# Patient Record
Sex: MALE | Race: WHITE | NOT HISPANIC OR LATINO | Employment: OTHER | URBAN - NONMETROPOLITAN AREA
[De-identification: names, ages, dates, MRNs, and addresses within clinical notes are randomized per-mention and may not be internally consistent; named-entity substitution may affect disease eponyms.]

---

## 2017-07-14 ENCOUNTER — OFFICE VISIT (OUTPATIENT)
Dept: UROLOGY | Facility: CLINIC | Age: 67
End: 2017-07-14

## 2017-07-14 VITALS — BODY MASS INDEX: 23.04 KG/M2 | WEIGHT: 152 LBS | HEIGHT: 68 IN

## 2017-07-14 DIAGNOSIS — N40.1 BENIGN NON-NODULAR PROSTATIC HYPERPLASIA WITH LOWER URINARY TRACT SYMPTOMS: ICD-10-CM

## 2017-07-14 DIAGNOSIS — R31.29 MICROSCOPIC HEMATURIA: Primary | ICD-10-CM

## 2017-07-14 LAB
BILIRUB BLD-MCNC: NEGATIVE MG/DL
CLARITY, POC: CLEAR
COLOR UR: YELLOW
GLUCOSE UR STRIP-MCNC: NEGATIVE MG/DL
KETONES UR QL: NEGATIVE
LEUKOCYTE EST, POC: NEGATIVE
NITRITE UR-MCNC: NEGATIVE MG/ML
PH UR: 5.5 [PH] (ref 5–8)
PROT UR STRIP-MCNC: NEGATIVE MG/DL
RBC # UR STRIP: ABNORMAL /UL
SP GR UR: 1.02 (ref 1–1.03)
UROBILINOGEN UR QL: NORMAL

## 2017-07-14 PROCEDURE — 99214 OFFICE O/P EST MOD 30 MIN: CPT | Performed by: UROLOGY

## 2017-07-14 PROCEDURE — 81003 URINALYSIS AUTO W/O SCOPE: CPT | Performed by: UROLOGY

## 2017-07-14 RX ORDER — ERGOCALCIFEROL 1.25 MG/1
CAPSULE ORAL
Refills: 2 | COMMUNITY
Start: 2017-06-15 | End: 2019-09-24

## 2017-07-14 RX ORDER — LEVOTHYROXINE SODIUM 0.05 MG/1
50 TABLET ORAL DAILY
Refills: 2 | COMMUNITY
Start: 2017-06-15 | End: 2020-08-03

## 2017-07-14 RX ORDER — TAMSULOSIN HYDROCHLORIDE 0.4 MG/1
1 CAPSULE ORAL DAILY
Refills: 5 | COMMUNITY
Start: 2017-06-15 | End: 2020-12-18

## 2017-07-14 RX ORDER — LISINOPRIL 2.5 MG/1
TABLET ORAL
Refills: 5 | COMMUNITY
Start: 2017-06-15 | End: 2020-08-03

## 2017-07-14 RX ORDER — WARFARIN SODIUM 5 MG/1
5 TABLET ORAL NIGHTLY
Refills: 5 | COMMUNITY
Start: 2017-06-15 | End: 2020-08-03

## 2017-07-14 RX ORDER — SITAGLIPTIN 100 MG/1
TABLET, FILM COATED ORAL
Refills: 5 | COMMUNITY
Start: 2017-06-15 | End: 2019-09-24

## 2017-07-14 RX ORDER — OMEPRAZOLE 20 MG/1
20 CAPSULE, DELAYED RELEASE ORAL AS NEEDED
Refills: 3 | COMMUNITY
Start: 2017-06-15 | End: 2020-08-03

## 2017-07-14 RX ORDER — NYSTATIN 100000 U/G
CREAM TOPICAL
Refills: 5 | COMMUNITY
Start: 2017-06-28 | End: 2019-09-24

## 2017-07-14 RX ORDER — FERROUS SULFATE 325(65) MG
325 TABLET ORAL
Refills: 3 | COMMUNITY
Start: 2017-06-17 | End: 2020-08-03

## 2017-07-14 NOTE — PROGRESS NOTES
Chief Complaint:          Chief Complaint   Patient presents with   • Micro Hematuria     Patient was referred by PCP for Micro Hematuria.  Patient denies Gross Hematuria.       HPI:   67 y.o. male.    HPI  Pt has microscopic hematuria.  Pt has mild bph symptoms.      Past Medical History:        Past Medical History:   Diagnosis Date   • Blood clot in abdominal vein    • BPH (benign prostatic hypertrophy)    • Diabetes    • Elevated PSA    • GERD (gastroesophageal reflux disease)    • Hypertension    • MI (myocardial infarction)          Current Meds:     Current Outpatient Prescriptions   Medication Sig Dispense Refill   • ferrous sulfate 325 (65 FE) MG tablet   3   • JANUVIA 100 MG tablet   5   • levothyroxine (SYNTHROID, LEVOTHROID) 50 MCG tablet   2   • lisinopril (PRINIVIL,ZESTRIL) 2.5 MG tablet   5   • metFORMIN (GLUCOPHAGE) 500 MG tablet   5   • nystatin (MYCOSTATIN) 701987 UNIT/GM cream   5   • omeprazole (priLOSEC) 20 MG capsule   3   • STOOL SOFTENER 250 MG capsule   4   • tamsulosin (FLOMAX) 0.4 MG capsule 24 hr capsule   5   • vitamin D (ERGOCALCIFEROL) 53330 UNITS capsule capsule   2   • warfarin (COUMADIN) 5 MG tablet   5     No current facility-administered medications for this visit.         Allergies:      Allergies   Allergen Reactions   • Reglan [Metoclopramide]         Past Surgical History:     Past Surgical History:   Procedure Laterality Date   • ABDOMINAL WASHOUT     • CHOLECYSTECTOMY     • HERNIA REPAIR     • KIDNEY STONE SURGERY           Social History:     Social History     Social History   • Marital status:      Spouse name: N/A   • Number of children: N/A   • Years of education: N/A     Occupational History   • Not on file.     Social History Main Topics   • Smoking status: Former Smoker   • Smokeless tobacco: Not on file   • Alcohol use No   • Drug use: Not on file   • Sexual activity: Not on file     Other Topics Concern   • Not on file     Social History Narrative   • No  narrative on file       Family History:     Family History   Problem Relation Age of Onset   • Heart disease Father    • Heart failure Mother    • Stroke Brother        Review of Systems:     Review of Systems    Physical Exam:     Physical Exam   Constitutional: He is oriented to person, place, and time.   HENT:   Head: Normocephalic and atraumatic.   Right Ear: External ear normal.   Left Ear: External ear normal.   Nose: Nose normal.   Mouth/Throat: Oropharynx is clear and moist.   Eyes: Conjunctivae and EOM are normal. Pupils are equal, round, and reactive to light.   Neck: Normal range of motion. Neck supple. No thyromegaly present.   Cardiovascular: Normal rate, regular rhythm, normal heart sounds and intact distal pulses.    No murmur heard.  Pulmonary/Chest: Effort normal and breath sounds normal. No respiratory distress. He has no wheezes. He has no rales. He exhibits no tenderness.   Abdominal: Soft. Bowel sounds are normal. He exhibits no distension and no mass. There is no tenderness. No hernia.   Genitourinary: Rectum normal, prostate normal and penis normal.   Musculoskeletal: Normal range of motion. He exhibits no edema or tenderness.   Lymphadenopathy:     He has no cervical adenopathy.   Neurological: He is alert and oriented to person, place, and time. No cranial nerve deficit. He exhibits normal muscle tone. Coordination normal.   Skin: Skin is warm. No rash noted.   Psychiatric: He has a normal mood and affect. His behavior is normal. Judgment and thought content normal.   Nursing note and vitals reviewed.      Procedure:     No notes on file      Assessment:     Encounter Diagnoses   Name Primary?   • Microscopic hematuria Yes   • Benign non-nodular prostatic hyperplasia with lower urinary tract symptoms      Orders Placed This Encounter   Procedures   • POC Urinalysis Dipstick, Automated       Plan:   Will order a psa and will get ctscan with renal mass protocol and office  cystoscop    Counseling was given to patient and family for the following topics impressions. and the interim medical history and current results were reviewed.  A treatment plan with follow-up was made. Total time of the encounter was 33 minutes and 33 minutes were spent discussing Microscopic hematuria [R31.29] face-to-face.        This document has been electronically signed by Lalo Camejo MD July 14, 2017 2:25 PM

## 2017-07-27 ENCOUNTER — HOSPITAL ENCOUNTER (OUTPATIENT)
Dept: CT IMAGING | Facility: HOSPITAL | Age: 67
Discharge: HOME | End: 2017-07-27
Attending: UROLOGY | Admitting: UROLOGY

## 2017-07-27 DIAGNOSIS — R31.29 MICROSCOPIC HEMATURIA: ICD-10-CM

## 2017-07-27 LAB — CREAT BLDA-MCNC: 1 MG/DL (ref 0.6–1.3)

## 2017-07-27 PROCEDURE — 0 IOPAMIDOL 61 % SOLUTION: Performed by: UROLOGY

## 2017-07-27 PROCEDURE — 82565 ASSAY OF CREATININE: CPT

## 2017-07-27 PROCEDURE — 74178 CT ABD&PLV WO CNTR FLWD CNTR: CPT | Performed by: RADIOLOGY

## 2017-07-27 PROCEDURE — 74178 CT ABD&PLV WO CNTR FLWD CNTR: CPT

## 2017-07-27 RX ADMIN — IOPAMIDOL 100 ML: 612 INJECTION, SOLUTION INTRAVENOUS at 10:00

## 2017-08-10 ENCOUNTER — PROCEDURE VISIT (OUTPATIENT)
Dept: UROLOGY | Facility: CLINIC | Age: 67
End: 2017-08-10

## 2017-08-10 DIAGNOSIS — N40.1 BENIGN NON-NODULAR PROSTATIC HYPERPLASIA WITH LOWER URINARY TRACT SYMPTOMS: ICD-10-CM

## 2017-08-10 DIAGNOSIS — R31.29 MICROHEMATURIA: Primary | ICD-10-CM

## 2017-08-10 DIAGNOSIS — R97.20 ELEVATED PSA: ICD-10-CM

## 2017-08-10 LAB
BILIRUB BLD-MCNC: NEGATIVE MG/DL
CLARITY, POC: CLEAR
COLOR UR: YELLOW
GLUCOSE UR STRIP-MCNC: NEGATIVE MG/DL
KETONES UR QL: NEGATIVE
LEUKOCYTE EST, POC: NEGATIVE
NITRITE UR-MCNC: NEGATIVE MG/ML
PH UR: 5 [PH] (ref 5–8)
PROT UR STRIP-MCNC: NEGATIVE MG/DL
PSA SERPL-MCNC: 0.17 NG/ML (ref 0–4)
RBC # UR STRIP: ABNORMAL /UL
SP GR UR: 1.02 (ref 1–1.03)
UROBILINOGEN UR QL: NORMAL

## 2017-08-10 PROCEDURE — 99213 OFFICE O/P EST LOW 20 MIN: CPT | Performed by: UROLOGY

## 2017-08-10 PROCEDURE — 84153 ASSAY OF PSA TOTAL: CPT | Performed by: UROLOGY

## 2017-08-10 PROCEDURE — 36415 COLL VENOUS BLD VENIPUNCTURE: CPT | Performed by: UROLOGY

## 2017-08-10 PROCEDURE — 52000 CYSTOURETHROSCOPY: CPT | Performed by: UROLOGY

## 2017-08-10 PROCEDURE — 81003 URINALYSIS AUTO W/O SCOPE: CPT | Performed by: UROLOGY

## 2017-08-10 NOTE — PROGRESS NOTES
Chief Complaint:       Chief Complaint   Patient presents with   • Blood in Urine           HPI:       HPI  Microhematuria and bph symptoms.  Ct scan shows enlarged prostate and thickened bladder.        PMI:      Past Medical History:   Diagnosis Date   • Blood clot in abdominal vein    • BPH (benign prostatic hypertrophy)    • Diabetes    • Elevated PSA    • GERD (gastroesophageal reflux disease)    • Hypertension    • MI (myocardial infarction)            Medications:        Current Outpatient Prescriptions:   •  ferrous sulfate 325 (65 FE) MG tablet, , Disp: , Rfl: 3  •  JANUVIA 100 MG tablet, , Disp: , Rfl: 5  •  levothyroxine (SYNTHROID, LEVOTHROID) 50 MCG tablet, , Disp: , Rfl: 2  •  lisinopril (PRINIVIL,ZESTRIL) 2.5 MG tablet, , Disp: , Rfl: 5  •  metFORMIN (GLUCOPHAGE) 500 MG tablet, , Disp: , Rfl: 5  •  nystatin (MYCOSTATIN) 227125 UNIT/GM cream, , Disp: , Rfl: 5  •  omeprazole (priLOSEC) 20 MG capsule, , Disp: , Rfl: 3  •  STOOL SOFTENER 250 MG capsule, , Disp: , Rfl: 4  •  tamsulosin (FLOMAX) 0.4 MG capsule 24 hr capsule, , Disp: , Rfl: 5  •  vitamin D (ERGOCALCIFEROL) 73452 UNITS capsule capsule, , Disp: , Rfl: 2  •  warfarin (COUMADIN) 5 MG tablet, , Disp: , Rfl: 5        Allergies:      Allergies   Allergen Reactions   • Reglan [Metoclopramide]            Past Surgical Histroy:      Past Surgical History:   Procedure Laterality Date   • ABDOMINAL WASHOUT     • CHOLECYSTECTOMY     • HERNIA REPAIR     • KIDNEY STONE SURGERY             Social History:      Social History     Social History   • Marital status:      Spouse name: N/A   • Number of children: N/A   • Years of education: N/A     Occupational History   • Not on file.     Social History Main Topics   • Smoking status: Former Smoker   • Smokeless tobacco: Not on file   • Alcohol use No   • Drug use: Not on file   • Sexual activity: Not on file     Other Topics Concern   • Not on file     Social History Narrative           Family  History:      Family History   Problem Relation Age of Onset   • Heart disease Father    • Heart failure Mother    • Stroke Brother              Physical Exam:      Physical Exam        Procedure:      Procedure: Cystoscopy Male    Indication: microscopic hematuria    Urinalysis Performed Today:  Negative for Infection    Premedication:none    Informed Consent Obtained    Sterile prep performed in usual fashion    11 cc of topical lidocaine inserted urethrally for 10 minutes    Flexible cystoscope inserted and bladder examined    Findings: abnormal 4 + trabeculation of bladder and intravesical median lobe and trilobar BPH    Additional Procedure with Cystoscopy: none    Discussion:      Will draw psa today and discussed finasteride with pt will see pt back in a month to review psa's.  Counseling was given to patient and family for the following topics diagnostic results. and the interim medical history and current results were reviewed.  A treatment plan with follow-up was made. Total time of the encounter was 21 minutes and 21 minutes were spent discussing Microhematuria [R31.29] face-to-face.    This document has been electronically signed by Lalo Camejo MD August 10, 2017 3:04 PM  .

## 2019-09-24 ENCOUNTER — OFFICE VISIT (OUTPATIENT)
Dept: UROLOGY | Facility: CLINIC | Age: 69
End: 2019-09-24

## 2019-09-24 ENCOUNTER — HOSPITAL ENCOUNTER (OUTPATIENT)
Dept: GENERAL RADIOLOGY | Facility: HOSPITAL | Age: 69
Discharge: HOME | End: 2019-09-24
Admitting: UROLOGY

## 2019-09-24 VITALS — HEIGHT: 68 IN | WEIGHT: 165 LBS | BODY MASS INDEX: 25.01 KG/M2

## 2019-09-24 DIAGNOSIS — N47.1 PHIMOSIS: ICD-10-CM

## 2019-09-24 DIAGNOSIS — R39.12 BENIGN PROSTATIC HYPERPLASIA WITH WEAK URINARY STREAM: ICD-10-CM

## 2019-09-24 DIAGNOSIS — N20.0 KIDNEY STONES: Primary | ICD-10-CM

## 2019-09-24 DIAGNOSIS — N40.1 BENIGN PROSTATIC HYPERPLASIA WITH WEAK URINARY STREAM: ICD-10-CM

## 2019-09-24 LAB
BILIRUB BLD-MCNC: NEGATIVE MG/DL
CLARITY, POC: CLEAR
COLOR UR: YELLOW
GLUCOSE UR STRIP-MCNC: NEGATIVE MG/DL
KETONES UR QL: NEGATIVE
LEUKOCYTE EST, POC: NEGATIVE
NITRITE UR-MCNC: NEGATIVE MG/ML
PH UR: 6 [PH] (ref 5–8)
PROT UR STRIP-MCNC: NEGATIVE MG/DL
RBC # UR STRIP: NEGATIVE /UL
SP GR UR: 1.02 (ref 1–1.03)
UROBILINOGEN UR QL: NORMAL

## 2019-09-24 PROCEDURE — 74018 RADEX ABDOMEN 1 VIEW: CPT

## 2019-09-24 PROCEDURE — 81003 URINALYSIS AUTO W/O SCOPE: CPT | Performed by: UROLOGY

## 2019-09-24 PROCEDURE — 74018 RADEX ABDOMEN 1 VIEW: CPT | Performed by: RADIOLOGY

## 2019-09-24 PROCEDURE — 99213 OFFICE O/P EST LOW 20 MIN: CPT | Performed by: UROLOGY

## 2019-09-24 PROCEDURE — 84153 ASSAY OF PSA TOTAL: CPT | Performed by: UROLOGY

## 2019-09-24 PROCEDURE — 36415 COLL VENOUS BLD VENIPUNCTURE: CPT | Performed by: UROLOGY

## 2019-09-24 NOTE — PROGRESS NOTES
Chief Complaint:          Kidney stone    HPI:   69 y.o. male.  Pt had ct scan done in [facility] that showed a 1 cm in size.  He had cystoscopy done in [facility] that was negative by history.  He grew that stone between 7/2017 and 2/2019.  HPI  His kub today shows a 1 cm left kidney stone.    Past Medical History:        Past Medical History:   Diagnosis Date   • Blood clot in abdominal vein    • BPH (benign prostatic hypertrophy)    • Diabetes (CMS/HCC)    • Elevated PSA    • GERD (gastroesophageal reflux disease)    • MI (myocardial infarction) (CMS/HCC)          Current Meds:     Current Outpatient Medications   Medication Sig Dispense Refill   • ferrous sulfate 325 (65 FE) MG tablet   3   • levothyroxine (SYNTHROID, LEVOTHROID) 50 MCG tablet   2   • lisinopril (PRINIVIL,ZESTRIL) 2.5 MG tablet   5   • omeprazole (priLOSEC) 20 MG capsule   3   • STOOL SOFTENER 250 MG capsule   4   • tamsulosin (FLOMAX) 0.4 MG capsule 24 hr capsule   5   • warfarin (COUMADIN) 5 MG tablet   5     No current facility-administered medications for this visit.         Allergies:      Allergies   Allergen Reactions   • Reglan [Metoclopramide]         Past Surgical History:     Past Surgical History:   Procedure Laterality Date   • ABDOMINAL WASHOUT     • CHOLECYSTECTOMY     • HERNIA REPAIR     • KIDNEY STONE SURGERY           Social History:     Social History     Socioeconomic History   • Marital status:      Spouse name: Not on file   • Number of children: Not on file   • Years of education: Not on file   • Highest education level: Not on file   Tobacco Use   • Smoking status: Former Smoker   • Smokeless tobacco: Never Used   Substance and Sexual Activity   • Alcohol use: No   • Drug use: No   • Sexual activity: Defer       Family History:     Family History   Problem Relation Age of Onset   • Heart disease Father    • Heart failure Mother    • Stroke Brother        Review of Systems:     Review of Systems   Constitutional:  Positive for fatigue.   HENT: Positive for sinus pressure and sinus pain.    Eyes: Negative for pain and redness.   Respiratory: Negative for chest tightness.    Cardiovascular: Negative for chest pain.   Gastrointestinal: Negative for abdominal pain, constipation and diarrhea.   Endocrine: Negative for heat intolerance.   Genitourinary: Positive for flank pain and hematuria. Negative for dysuria and frequency.   Musculoskeletal: Negative for back pain.   Skin: Negative for rash.   Allergic/Immunologic: Negative for food allergies.   Neurological: Positive for headaches.   Hematological: Bruises/bleeds easily.   Psychiatric/Behavioral: The patient is not nervous/anxious.        IPSS Questionnaire (AUA-7):  Over the past month…    1)  Incomplete Emptying  How often have you had a sensation of not emptying your bladder?  0 - Not at all   2)  Frequency  How often have you had to urinate less than every two hours? 1 - Less than 1 time in 5   3)  Intermittency  How often have you found you stopped and started again several times when you urinated?  1 - Less than 1 time in 5   4) Urgency  How often have you found it difficult to postpone urination?  0 - Not at all   5) Weak Stream  How often have you had a weak urinary stream?  0 - Not at all   6) Straining  How often have you had to push or strain to begin urination?  0 - Not at all   7) Nocturia  How many times did you typically get up at night to urinate?  1 - 1 time   Total Score:  3       Quality of life due to urinary symptoms:  If you were to spend the rest of your life with your urinary condition the way it is now, how would you feel about that? 2-Mostly Satisfied   Urine Leakage (Incontinence) 1-Mild (A few drops a day, no pad use)       I have reviewed the follow portions of the patient's history and confirmed they are accurate today:  allergies, current medications, past family history, past medical history, past social history, past surgical history, problem  "list and ROS  Physical Exam:     Physical Exam   Constitutional: He is oriented to person, place, and time.   HENT:   Head: Normocephalic and atraumatic.   Right Ear: External ear normal.   Left Ear: External ear normal.   Nose: Nose normal.   Mouth/Throat: Oropharynx is clear and moist.   Eyes: Conjunctivae and EOM are normal. Pupils are equal, round, and reactive to light.   Neck: Normal range of motion. Neck supple. No thyromegaly present.   Cardiovascular: Normal rate, regular rhythm, normal heart sounds and intact distal pulses.   No murmur heard.  Pulmonary/Chest: Effort normal and breath sounds normal. No respiratory distress. He has no wheezes. He has no rales. He exhibits no tenderness.   Abdominal: Soft. Bowel sounds are normal. He exhibits no distension and no mass. There is no tenderness. No hernia.   Genitourinary: Rectum normal, prostate normal and penis normal.   Musculoskeletal: Normal range of motion. He exhibits no edema or tenderness.   Lymphadenopathy:     He has no cervical adenopathy.   Neurological: He is alert and oriented to person, place, and time. No cranial nerve deficit. He exhibits normal muscle tone. Coordination normal.   Skin: Skin is warm. No rash noted.   Psychiatric: He has a normal mood and affect. His behavior is normal. Judgment and thought content normal.   Nursing note and vitals reviewed.      Ht 172.7 cm (68\")   Wt 74.8 kg (165 lb)   BMI 25.09 kg/m²    Procedure:         Assessment:     Encounter Diagnoses   Name Primary?   • Kidney stones Yes   • Benign prostatic hyperplasia with weak urinary stream    • Phimosis        Orders Placed This Encounter   Procedures   • XR Abdomen KUB     Order Specific Question:   Reason for Exam:     Answer:   kidney stones   • PSA DIAGNOSTIC     Standing Status:   Future     Number of Occurrences:   1     Standing Expiration Date:   9/24/2022   • PSA DIAGNOSTIC     Standing Status:   Future     Standing Expiration Date:   9/24/2020   • POC " Urinalysis Dipstick, Automated       Patient reports that he is not currently experiencing any symptoms of urinary incontinence.      Plan:   Pt and I discussed circumcision and left eswl but pt will have to be off coumadin and needs a cardiac clearance for surgery.  Pt needs to think if the benefits vs risks.    Smoking Cessation Counseling:  Former smoker.  Patient does not currently use any tobacco products.   Quit 11 years ago    Counseling was given to patient and family for the following topics instructions for management as follows: phimosis and large stone. The interim medical history and current results were reviewed.  A treatment plan with follow-up was made for Kidney stones [N20.0].       This document has been electronically signed by Lalo Camejo MD September 26, 2019 1:24 PM

## 2019-09-25 LAB — PSA SERPL-MCNC: 0.19 NG/ML (ref 0–4)

## 2020-01-14 ENCOUNTER — OFFICE VISIT (OUTPATIENT)
Dept: UROLOGY | Facility: CLINIC | Age: 70
End: 2020-01-14

## 2020-01-14 VITALS — HEIGHT: 68 IN | WEIGHT: 168.4 LBS | BODY MASS INDEX: 25.52 KG/M2

## 2020-01-14 DIAGNOSIS — N20.0 KIDNEY STONE: Primary | ICD-10-CM

## 2020-01-14 DIAGNOSIS — N47.1 PHIMOSIS: ICD-10-CM

## 2020-01-14 PROCEDURE — 99213 OFFICE O/P EST LOW 20 MIN: CPT | Performed by: UROLOGY

## 2020-01-14 NOTE — PROGRESS NOTES
Chief Complaint:          Kidney stones.    HPI:   69 y.o. male.  Pt is still having problems with his phimotic foreskin.  He has been on coumadin because of hx of blood clots in his intestine by history.  His primary care physician says he can stop coumadin prior to circumcision and left ESWL.  His kub shows a 1 cm upper pole stone.  HPI      Past Medical History:        Past Medical History:   Diagnosis Date   • Blood clot in abdominal vein    • BPH (benign prostatic hypertrophy)    • Diabetes (CMS/HCC)    • Elevated PSA    • GERD (gastroesophageal reflux disease)    • MI (myocardial infarction) (CMS/[facility])          Current Meds:     Current Outpatient Medications   Medication Sig Dispense Refill   • ferrous sulfate 325 (65 FE) MG tablet   3   • levothyroxine (SYNTHROID, LEVOTHROID) 50 MCG tablet   2   • lisinopril (PRINIVIL,ZESTRIL) 2.5 MG tablet   5   • omeprazole (priLOSEC) 20 MG capsule   3   • STOOL SOFTENER 250 MG capsule   4   • tamsulosin (FLOMAX) 0.4 MG capsule 24 hr capsule   5   • warfarin (COUMADIN) 5 MG tablet   5     No current facility-administered medications for this visit.         Allergies:      Allergies   Allergen Reactions   • Reglan [Metoclopramide]         Past Surgical History:     Past Surgical History:   Procedure Laterality Date   • ABDOMINAL WASHOUT     • CHOLECYSTECTOMY     • HERNIA REPAIR     • KIDNEY STONE SURGERY           Social History:     Social History     Socioeconomic History   • Marital status:      Spouse name: Not on file   • Number of children: Not on file   • Years of education: Not on file   • Highest education level: Not on file   Tobacco Use   • Smoking status: Former Smoker   • Smokeless tobacco: Never Used   Substance and Sexual Activity   • Alcohol use: No   • Drug use: No   • Sexual activity: Defer       Family History:     Family History   Problem Relation Age of Onset   • Heart disease Father    • Heart failure Mother    • Stroke Brother        Review of  Systems:     Review of Systems   Constitutional: Negative for chills, fatigue and fever.   HENT: Negative for congestion and sinus pressure.    Respiratory: Negative for chest tightness and shortness of breath.    Cardiovascular: Negative for chest pain.   Gastrointestinal: Negative for abdominal pain, constipation, diarrhea, nausea and vomiting.   Genitourinary: Negative for flank pain, frequency, hematuria and urgency.   Musculoskeletal: Negative for back pain and neck pain.   Skin: Negative for rash.   Neurological: Negative for dizziness and headaches.   Hematological: Does not bruise/bleed easily.   Psychiatric/Behavioral: The patient is not nervous/anxious.              I have reviewed the follow portions of the patient's history and confirmed they are accurate today:  allergies, current medications, past family history, past medical history, past social history, past surgical history, problem list and ROS  Physical Exam:     Physical Exam   Constitutional: He is oriented to person, place, and time.   HENT:   Head: Normocephalic and atraumatic.   Right Ear: External ear normal.   Left Ear: External ear normal.   Nose: Nose normal.   Mouth/Throat: Oropharynx is clear and moist.   Eyes: Pupils are equal, round, and reactive to light. Conjunctivae and EOM are normal.   Neck: Normal range of motion. Neck supple. No thyromegaly present.   Cardiovascular: Normal rate, regular rhythm, normal heart sounds and intact distal pulses.   No murmur heard.  Pulmonary/Chest: Effort normal and breath sounds normal. No respiratory distress. He has no wheezes. He has no rales. He exhibits no tenderness.   Abdominal: Soft. Bowel sounds are normal. He exhibits no distension and no mass. There is no tenderness. No hernia.   Genitourinary: Rectum normal, prostate normal and penis normal.   Musculoskeletal: Normal range of motion. He exhibits no edema or tenderness.   Lymphadenopathy:     He has no cervical adenopathy.    "  Neurological: He is alert and oriented to person, place, and time. No cranial nerve deficit. He exhibits normal muscle tone. Coordination normal.   Skin: Skin is warm. No rash noted.   Psychiatric: He has a normal mood and affect. His behavior is normal. Judgment and thought content normal.   Nursing note and vitals reviewed.      Ht 172.7 cm (68\")   Wt 76.4 kg (168 lb 6.4 oz)   BMI 25.61 kg/m²    Procedure:         Assessment:     Encounter Diagnoses   Name Primary?   • Kidney stone Yes   • Phimosis        No orders of the defined types were placed in this encounter.      Patient reports that he is not currently experiencing any symptoms of urinary incontinence.      Plan:   I would recommend left ESWL and circumcision.  He should be off coumadin for 7 days with a normal INR at [facility].    Smoking Cessation Counseling:  Former smoker.  Patient does not currently use any tobacco products.   Quit in 2008    Counseling was given to patient and family for the following topics impressions as follows: kdiney stone and phimosis. The interim medical history and current results were reviewed.  A treatment plan with follow-up was made for Kidney stone [N20.0].   This document has been electronically signed by Lalo Camejo MD January 14, 2020 3:04 PM      "

## 2020-01-27 PROBLEM — N47.1 PHIMOSIS: Status: ACTIVE | Noted: 2020-01-27

## 2020-01-27 PROBLEM — N20.0 KIDNEY STONE: Status: ACTIVE | Noted: 2020-01-27

## 2020-01-29 ENCOUNTER — APPOINTMENT (OUTPATIENT)
Dept: PREADMISSION TESTING | Facility: HOSPITAL | Age: 70
End: 2020-01-29

## 2020-01-29 DIAGNOSIS — N47.1 PHIMOSIS: ICD-10-CM

## 2020-01-29 DIAGNOSIS — N20.0 KIDNEY STONE: ICD-10-CM

## 2020-01-29 LAB
ANION GAP SERPL CALCULATED.3IONS-SCNC: 11.3 MMOL/L (ref 5–15)
BUN BLD-MCNC: 16 MG/DL (ref 8–23)
BUN/CREAT SERPL: 19.5 (ref 7–25)
CALCIUM SPEC-SCNC: 9.1 MG/DL (ref 8.6–10.5)
CHLORIDE SERPL-SCNC: 103 MMOL/L (ref 98–107)
CO2 SERPL-SCNC: 25.7 MMOL/L (ref 22–29)
CREAT BLD-MCNC: 0.82 MG/DL (ref 0.76–1.27)
DEPRECATED RDW RBC AUTO: 47 FL (ref 37–54)
ERYTHROCYTE [DISTWIDTH] IN BLOOD BY AUTOMATED COUNT: 14.6 % (ref 12.3–15.4)
GFR SERPL CREATININE-BSD FRML MDRD: 93 ML/MIN/1.73
GLUCOSE BLD-MCNC: 114 MG/DL (ref 65–99)
HCT VFR BLD AUTO: 36.4 % (ref 37.5–51)
HGB BLD-MCNC: 11.3 G/DL (ref 13–17.7)
MCH RBC QN AUTO: 27.2 PG (ref 26.6–33)
MCHC RBC AUTO-ENTMCNC: 31 G/DL (ref 31.5–35.7)
MCV RBC AUTO: 87.7 FL (ref 79–97)
PLATELET # BLD AUTO: 165 10*3/MM3 (ref 140–450)
PMV BLD AUTO: 10.9 FL (ref 6–12)
POTASSIUM BLD-SCNC: 4.3 MMOL/L (ref 3.5–5.2)
RBC # BLD AUTO: 4.15 10*6/MM3 (ref 4.14–5.8)
SODIUM BLD-SCNC: 140 MMOL/L (ref 136–145)
WBC NRBC COR # BLD: 4.67 10*3/MM3 (ref 3.4–10.8)

## 2020-01-29 PROCEDURE — 36415 COLL VENOUS BLD VENIPUNCTURE: CPT

## 2020-01-29 PROCEDURE — 80048 BASIC METABOLIC PNL TOTAL CA: CPT | Performed by: UROLOGY

## 2020-01-29 PROCEDURE — 85027 COMPLETE CBC AUTOMATED: CPT | Performed by: UROLOGY

## 2020-01-29 PROCEDURE — 93010 ELECTROCARDIOGRAM REPORT: CPT | Performed by: SPECIALIST

## 2020-01-29 PROCEDURE — 93005 ELECTROCARDIOGRAM TRACING: CPT

## 2020-01-31 ENCOUNTER — ANESTHESIA (OUTPATIENT)
Dept: PERIOP | Facility: HOSPITAL | Age: 70
End: 2020-01-31

## 2020-01-31 ENCOUNTER — HOSPITAL ENCOUNTER (OUTPATIENT)
Facility: HOSPITAL | Age: 70
Setting detail: HOSPITAL OUTPATIENT SURGERY
Discharge: HOME | End: 2020-01-31
Attending: UROLOGY | Admitting: UROLOGY

## 2020-01-31 ENCOUNTER — ANESTHESIA EVENT (OUTPATIENT)
Dept: PERIOP | Facility: HOSPITAL | Age: 70
End: 2020-01-31

## 2020-01-31 VITALS
BODY MASS INDEX: 24.48 KG/M2 | OXYGEN SATURATION: 99 % | RESPIRATION RATE: 17 BRPM | SYSTOLIC BLOOD PRESSURE: 148 MMHG | HEART RATE: 80 BPM | WEIGHT: 161.5 LBS | TEMPERATURE: 98 F | HEIGHT: 68 IN | DIASTOLIC BLOOD PRESSURE: 75 MMHG

## 2020-01-31 DIAGNOSIS — N20.0 KIDNEY STONE: ICD-10-CM

## 2020-01-31 DIAGNOSIS — N47.1 PHIMOSIS: ICD-10-CM

## 2020-01-31 LAB — GLUCOSE BLDC GLUCOMTR-MCNC: 107 MG/DL (ref 70–130)

## 2020-01-31 PROCEDURE — 82962 GLUCOSE BLOOD TEST: CPT

## 2020-01-31 PROCEDURE — 25010000002 FENTANYL CITRATE (PF) 100 MCG/2ML SOLUTION: Performed by: NURSE ANESTHETIST, CERTIFIED REGISTERED

## 2020-01-31 PROCEDURE — 25010000002 ONDANSETRON PER 1 MG: Performed by: NURSE ANESTHETIST, CERTIFIED REGISTERED

## 2020-01-31 PROCEDURE — 25010000003 CEFAZOLIN SODIUM-DEXTROSE 2-3 GM-%(50ML) RECONSTITUTED SOLUTION: Performed by: UROLOGY

## 2020-01-31 PROCEDURE — 25010000002 PROPOFOL 10 MG/ML EMULSION: Performed by: NURSE ANESTHETIST, CERTIFIED REGISTERED

## 2020-01-31 PROCEDURE — 50590 FRAGMENTING OF KIDNEY STONE: CPT | Performed by: UROLOGY

## 2020-01-31 PROCEDURE — 54161 CIRCUM 28 DAYS OR OLDER: CPT | Performed by: UROLOGY

## 2020-01-31 RX ORDER — FENTANYL CITRATE 50 UG/ML
INJECTION, SOLUTION INTRAMUSCULAR; INTRAVENOUS AS NEEDED
Status: DISCONTINUED | OUTPATIENT
Start: 2020-01-31 | End: 2020-01-31 | Stop reason: SURG

## 2020-01-31 RX ORDER — OXYCODONE HYDROCHLORIDE AND ACETAMINOPHEN 5; 325 MG/1; MG/1
1 TABLET ORAL ONCE AS NEEDED
Status: DISCONTINUED | OUTPATIENT
Start: 2020-01-31 | End: 2020-01-31 | Stop reason: HOSPADM

## 2020-01-31 RX ORDER — CEFAZOLIN SODIUM 2 G/50ML
2 SOLUTION INTRAVENOUS ONCE
Status: COMPLETED | OUTPATIENT
Start: 2020-01-31 | End: 2020-01-31

## 2020-01-31 RX ORDER — SODIUM CHLORIDE 0.9 % (FLUSH) 0.9 %
10 SYRINGE (ML) INJECTION AS NEEDED
Status: DISCONTINUED | OUTPATIENT
Start: 2020-01-31 | End: 2020-01-31 | Stop reason: HOSPADM

## 2020-01-31 RX ORDER — MEPERIDINE HYDROCHLORIDE 25 MG/ML
12.5 INJECTION INTRAMUSCULAR; INTRAVENOUS; SUBCUTANEOUS
Status: DISCONTINUED | OUTPATIENT
Start: 2020-01-31 | End: 2020-01-31 | Stop reason: HOSPADM

## 2020-01-31 RX ORDER — BACITRACIN, NEOMYCIN, POLYMYXIN B 400; 3.5; 5 [USP'U]/G; MG/G; [USP'U]/G
OINTMENT TOPICAL AS NEEDED
Status: DISCONTINUED | OUTPATIENT
Start: 2020-01-31 | End: 2020-01-31 | Stop reason: HOSPADM

## 2020-01-31 RX ORDER — CEPHALEXIN 250 MG/1
250 CAPSULE ORAL 3 TIMES DAILY
Qty: 21 CAPSULE | Refills: 0 | Status: SHIPPED | OUTPATIENT
Start: 2020-01-31 | End: 2020-02-07

## 2020-01-31 RX ORDER — SODIUM CHLORIDE 0.9 % (FLUSH) 0.9 %
10 SYRINGE (ML) INJECTION EVERY 12 HOURS SCHEDULED
Status: DISCONTINUED | OUTPATIENT
Start: 2020-01-31 | End: 2020-01-31 | Stop reason: HOSPADM

## 2020-01-31 RX ORDER — BUPIVACAINE HYDROCHLORIDE 5 MG/ML
INJECTION, SOLUTION PERINEURAL AS NEEDED
Status: DISCONTINUED | OUTPATIENT
Start: 2020-01-31 | End: 2020-01-31 | Stop reason: HOSPADM

## 2020-01-31 RX ORDER — IPRATROPIUM BROMIDE AND ALBUTEROL SULFATE 2.5; .5 MG/3ML; MG/3ML
3 SOLUTION RESPIRATORY (INHALATION) ONCE AS NEEDED
Status: DISCONTINUED | OUTPATIENT
Start: 2020-01-31 | End: 2020-01-31 | Stop reason: HOSPADM

## 2020-01-31 RX ORDER — OXYCODONE HYDROCHLORIDE AND ACETAMINOPHEN 5; 325 MG/1; MG/1
TABLET ORAL
Qty: 28 TABLET | Refills: 0 | Status: SHIPPED | OUTPATIENT
Start: 2020-01-31 | End: 2020-08-03

## 2020-01-31 RX ORDER — MAGNESIUM HYDROXIDE 1200 MG/15ML
LIQUID ORAL AS NEEDED
Status: DISCONTINUED | OUTPATIENT
Start: 2020-01-31 | End: 2020-01-31 | Stop reason: HOSPADM

## 2020-01-31 RX ORDER — PROPOFOL 10 MG/ML
VIAL (ML) INTRAVENOUS AS NEEDED
Status: DISCONTINUED | OUTPATIENT
Start: 2020-01-31 | End: 2020-01-31 | Stop reason: SURG

## 2020-01-31 RX ORDER — FAMOTIDINE 10 MG/ML
INJECTION, SOLUTION INTRAVENOUS AS NEEDED
Status: DISCONTINUED | OUTPATIENT
Start: 2020-01-31 | End: 2020-01-31 | Stop reason: SURG

## 2020-01-31 RX ORDER — LIDOCAINE HYDROCHLORIDE 20 MG/ML
INJECTION, SOLUTION INFILTRATION; PERINEURAL AS NEEDED
Status: DISCONTINUED | OUTPATIENT
Start: 2020-01-31 | End: 2020-01-31 | Stop reason: SURG

## 2020-01-31 RX ORDER — ONDANSETRON 2 MG/ML
INJECTION INTRAMUSCULAR; INTRAVENOUS AS NEEDED
Status: DISCONTINUED | OUTPATIENT
Start: 2020-01-31 | End: 2020-01-31 | Stop reason: SURG

## 2020-01-31 RX ORDER — ONDANSETRON 2 MG/ML
4 INJECTION INTRAMUSCULAR; INTRAVENOUS AS NEEDED
Status: DISCONTINUED | OUTPATIENT
Start: 2020-01-31 | End: 2020-01-31 | Stop reason: HOSPADM

## 2020-01-31 RX ORDER — FENTANYL CITRATE 50 UG/ML
50 INJECTION, SOLUTION INTRAMUSCULAR; INTRAVENOUS
Status: DISCONTINUED | OUTPATIENT
Start: 2020-01-31 | End: 2020-01-31 | Stop reason: HOSPADM

## 2020-01-31 RX ORDER — SODIUM CHLORIDE, SODIUM LACTATE, POTASSIUM CHLORIDE, CALCIUM CHLORIDE 600; 310; 30; 20 MG/100ML; MG/100ML; MG/100ML; MG/100ML
125 INJECTION, SOLUTION INTRAVENOUS CONTINUOUS
Status: DISCONTINUED | OUTPATIENT
Start: 2020-01-31 | End: 2020-01-31 | Stop reason: HOSPADM

## 2020-01-31 RX ADMIN — FENTANYL CITRATE 100 MCG: 50 INJECTION INTRAMUSCULAR; INTRAVENOUS at 13:47

## 2020-01-31 RX ADMIN — PROPOFOL 150 MG: 10 INJECTION, EMULSION INTRAVENOUS at 13:49

## 2020-01-31 RX ADMIN — EPHEDRINE SULFATE 10 MG: 50 INJECTION, SOLUTION INTRAVENOUS at 13:53

## 2020-01-31 RX ADMIN — CEFAZOLIN SODIUM 2 G: 2 SOLUTION INTRAVENOUS at 13:47

## 2020-01-31 RX ADMIN — FAMOTIDINE 20 MG: 10 INJECTION INTRAVENOUS at 13:47

## 2020-01-31 RX ADMIN — EPHEDRINE SULFATE 10 MG: 50 INJECTION, SOLUTION INTRAVENOUS at 13:55

## 2020-01-31 RX ADMIN — EPHEDRINE SULFATE 10 MG: 50 INJECTION, SOLUTION INTRAVENOUS at 13:57

## 2020-01-31 RX ADMIN — SODIUM CHLORIDE, POTASSIUM CHLORIDE, SODIUM LACTATE AND CALCIUM CHLORIDE 125 ML/HR: 600; 310; 30; 20 INJECTION, SOLUTION INTRAVENOUS at 12:12

## 2020-01-31 RX ADMIN — LIDOCAINE HYDROCHLORIDE 40 MG: 20 INJECTION, SOLUTION INFILTRATION; PERINEURAL at 13:47

## 2020-01-31 RX ADMIN — ONDANSETRON 4 MG: 2 INJECTION INTRAMUSCULAR; INTRAVENOUS at 13:49

## 2020-01-31 NOTE — ANESTHESIA POSTPROCEDURE EVALUATION
Patient: Cecil Gomez    Procedure Summary     Date:  01/31/20 Room / Location:   [facility] /  [facility]    Anesthesia Start:  1347 Anesthesia Stop:  1459    Procedures:       EXTRACORPOREAL SHOCKWAVE LITHOTRIPSY (Left )      CIRCUMCISION (N/A Penis) Diagnosis:       Kidney stone      Phimosis      (Kidney stone [N20.0])      (Phimosis [N47.1])    Surgeon:  Lalo Camejo MD Provider:  Alonso Barboza MD    Anesthesia Type:  general ASA Status:  3          Anesthesia Type: general    Vitals  Vitals Value Taken Time   /80 1/31/2020  2:59 PM   Temp 97.3 °F (36.3 °C) 1/31/2020  2:59 PM   Pulse 100 1/31/2020  2:59 PM   Resp 12 1/31/2020  2:59 PM   SpO2 98 % 1/31/2020  2:59 PM           Post Anesthesia Care and Evaluation    Patient location during evaluation: PHASE II  Patient participation: complete - patient participated  Level of consciousness: awake and alert  Pain score: 1  Pain management: adequate  Airway patency: patent  Anesthetic complications: No anesthetic complications  PONV Status: controlled  Cardiovascular status: acceptable  Respiratory status: acceptable  Hydration status: acceptable

## 2020-01-31 NOTE — ANESTHESIA PROCEDURE NOTES
Airway  Urgency: elective    Date/Time: 1/31/2020 1:49 PM  Airway not difficult    General Information and Staff    Patient location during procedure: OR  Anesthesiologist: Alonso Barboza MD  CRNA: Prabha Diaz CRNA    Indications and Patient Condition  Indications for airway management: airway protection    Preoxygenated: yes  Mask difficulty assessment: 0 - not attempted    Final Airway Details  Final airway type: supraglottic airway      Successful airway: classic  Size 4    Number of attempts at approach: 1  Assessment: lips, teeth, and gum same as pre-op and atraumatic intubation    Additional Comments  LMA placed with no trauma noted. Patient tolerated well. Good seal. Secured.

## 2020-01-31 NOTE — ANESTHESIA PREPROCEDURE EVALUATION
Anesthesia Evaluation     Patient summary reviewed and Nursing notes reviewed   history of anesthetic complications: prolonged sedation  NPO Solid Status: > 8 hours  NPO Liquid Status: > 8 hours           Airway   Mallampati: II  TM distance: >3 FB  Neck ROM: full  no difficulty expected  Dental - normal exam     Pulmonary - normal exam   (+) a smoker Former,   (-) asthma  Cardiovascular - normal exam  Exercise tolerance: good (4-7 METS)    NYHA Classification: II  PT is on anticoagulation therapy    (+) hypertension, past MI , dysrhythmias, CHF , DVT,   (-) angina      Neuro/Psych- negative ROS  (-) seizures, CVA  GI/Hepatic/Renal/Endo    (+)  GERD,  renal disease stones, diabetes mellitus, thyroid problem hypothyroidism    Musculoskeletal     Abdominal  - normal exam    Bowel sounds: normal.   Substance History - negative use     OB/GYN negative ob/gyn ROS         Other   arthritis,                    Anesthesia Plan    ASA 3     general     intravenous induction     Anesthetic plan, all risks, benefits, and alternatives have been provided, discussed and informed consent has been obtained with: patient.  Use of blood products discussed with patient  Consented to blood products.

## 2020-02-03 ENCOUNTER — TELEPHONE (OUTPATIENT)
Dept: UROLOGY | Facility: CLINIC | Age: 70
End: 2020-02-03

## 2020-02-03 RX ORDER — OXYCODONE HYDROCHLORIDE AND ACETAMINOPHEN 5; 325 MG/1; MG/1
TABLET ORAL
Qty: 28 TABLET | Refills: 0 | Status: SHIPPED | OUTPATIENT
Start: 2020-02-03 | End: 2020-08-03

## 2020-02-03 NOTE — TELEPHONE ENCOUNTER
The rx for the pts pain medication [facility], [facility]

## 2020-02-03 NOTE — TELEPHONE ENCOUNTER
Spoke to Dr Camejo - he re-sent the script, called [facility] to verify that the medication was received. Per pharmacist they did receive it.

## 2020-08-03 ENCOUNTER — OFFICE VISIT (OUTPATIENT)
Dept: UROLOGY | Facility: CLINIC | Age: 70
End: 2020-08-03

## 2020-08-03 ENCOUNTER — HOSPITAL ENCOUNTER (OUTPATIENT)
Dept: GENERAL RADIOLOGY | Facility: HOSPITAL | Age: 70
Discharge: HOME | End: 2020-08-03
Admitting: UROLOGY

## 2020-08-03 VITALS — BODY MASS INDEX: 24.67 KG/M2 | WEIGHT: 162.8 LBS | HEIGHT: 68 IN | TEMPERATURE: 98.2 F

## 2020-08-03 DIAGNOSIS — N20.0 KIDNEY STONE: ICD-10-CM

## 2020-08-03 DIAGNOSIS — N20.0 KIDNEY STONE: Primary | ICD-10-CM

## 2020-08-03 PROCEDURE — 74018 RADEX ABDOMEN 1 VIEW: CPT

## 2020-08-03 PROCEDURE — 99213 OFFICE O/P EST LOW 20 MIN: CPT | Performed by: UROLOGY

## 2020-08-03 PROCEDURE — 82365 CALCULUS SPECTROSCOPY: CPT | Performed by: UROLOGY

## 2020-08-03 PROCEDURE — 74018 RADEX ABDOMEN 1 VIEW: CPT | Performed by: RADIOLOGY

## 2020-08-03 RX ORDER — ASPIRIN 81 MG/1
81 TABLET ORAL DAILY PRN
COMMUNITY

## 2020-08-03 NOTE — PROGRESS NOTES
Chief Complaint:          Ureteral stone    HPI:   70 y.o. male.  Pt's kub just shows abundant stool but no stones.  He passed a stone 2 days ago.  HPI  Pt had a left eswl and circurncision last January.  We have sent the stone for analysis.    Past Medical History:        Past Medical History:   Diagnosis Date   • Anemia    • Arthritis    • Blood clot in abdominal vein    • BPH (benign prostatic hypertrophy)    • CHF (congestive heart failure) (CMS/[facility])    • Diabetes (CMS/[facility])    • Disease of thyroid gland    • Elevated PSA    • GERD (gastroesophageal reflux disease)    • H/O blood clots    • History of transfusion    • MI (myocardial infarction) (CMS/[facility])          Current Meds:     Current Outpatient Medications   Medication Sig Dispense Refill   • aspirin 81 MG EC tablet Take 81 mg by mouth Daily.     • tamsulosin (FLOMAX) 0.4 MG capsule 24 hr capsule 1 capsule Daily.  5     No current facility-administered medications for this visit.         Allergies:      Allergies   Allergen Reactions   • Reglan [Metoclopramide] Rash     In mouth and throat        Past Surgical History:     Past Surgical History:   Procedure Laterality Date   • ABDOMINAL SURGERY     • ABDOMINAL WASHOUT     • CARDIAC CATHETERIZATION     • CHOLECYSTECTOMY     • CIRCUMCISION N/A 1/31/2020    Procedure: CIRCUMCISION;  Surgeon: Lalo Camejo MD;  Location: [facility];  Service: Urology   • COLONOSCOPY     • ENDOSCOPY     • EXTRACORPOREAL SHOCK WAVE LITHOTRIPSY (ESWL) Left 1/31/2020    Procedure: EXTRACORPOREAL SHOCKWAVE LITHOTRIPSY;  Surgeon: Lalo Camejo MD;  Location: [facility];  Service: Urology   • EYE SURGERY Bilateral     iol   • HERNIA REPAIR     • KIDNEY STONE SURGERY           Social History:     Social History     Socioeconomic History   • Marital status:      Spouse name: Not on file   • Number of children: Not on file   • Years of education: Not on file   • Highest education level: Not on file   Tobacco Use      • Smoking status: Former Smoker   • Smokeless tobacco: Never Used   Substance and Sexual Activity   • Alcohol use: No   • Drug use: No   • Sexual activity: Defer       Family History:     Family History   Problem Relation Age of Onset   • Heart disease Father    • Heart failure Mother    • Stroke Brother        Review of Systems:     Review of Systems   Constitutional: Negative for chills, fatigue and fever.   HENT: Negative for congestion and sinus pressure.    Respiratory: Negative for chest tightness and shortness of breath.    Cardiovascular: Negative for chest pain.   Gastrointestinal: Negative for abdominal pain, constipation, diarrhea, nausea and vomiting.   Genitourinary: Positive for frequency and hematuria. Negative for flank pain and urgency.   Musculoskeletal: Negative for back pain and neck pain.   Skin: Negative for rash.   Neurological: Negative for dizziness and headaches.   Hematological: Does not bruise/bleed easily.   Psychiatric/Behavioral: The patient is not nervous/anxious.          Physical Exam:     Physical Exam   Constitutional: He is oriented to person, place, and time.   HENT:   Head: Normocephalic and atraumatic.   Right Ear: External ear normal.   Left Ear: External ear normal.   Nose: Nose normal.   Mouth/Throat: Oropharynx is clear and moist.   Eyes: Pupils are equal, round, and reactive to light. Conjunctivae and EOM are normal.   Neck: Normal range of motion. Neck supple. No thyromegaly present.   Cardiovascular: Normal rate, regular rhythm, normal heart sounds and intact distal pulses.   No murmur heard.  Pulmonary/Chest: Effort normal and breath sounds normal. No respiratory distress. He has no wheezes. He has no rales. He exhibits no tenderness.   Abdominal: Soft. Bowel sounds are normal. He exhibits no distension and no mass. There is no tenderness. No hernia.   Musculoskeletal: Normal range of motion. He exhibits no edema or tenderness.   Lymphadenopathy:     He has no  "cervical adenopathy.   Neurological: He is alert and oriented to person, place, and time. No cranial nerve deficit. He exhibits normal muscle tone. Coordination normal.   Skin: Skin is warm. No rash noted.   Psychiatric: He has a normal mood and affect. His behavior is normal. Judgment and thought content normal.   Nursing note and vitals reviewed.      Temp 98.2 °F (36.8 °C)   Ht 172.7 cm (68\")   Wt 73.8 kg (162 lb 12.8 oz)   BMI 24.75 kg/m²    Procedure:         Assessment:     Encounter Diagnosis   Name Primary?   • Kidney stone Yes       Orders Placed This Encounter   Procedures   • STONE ANALYSIS - Calculus,       Patient reports that he is not currently experiencing any symptoms of urinary incontinence.      Plan:   Will see him in 1 month to discuss stone analysis    Patient's Body mass index is 24.75 kg/m². BMI is within normal parameters. No follow-up required..      Smoking Cessation Counseling:  Former smoker.  Patient does not currently use any tobacco products.     Counseling was given to patient and family for the following topics instructions for management as follows: stones. The interim medical history and current results were reviewed.  A treatment plan with follow-up was made for Kidney stone [N20.0]. I spent 16 minutes face to face with Cecil Gomez and 80 percentage was spent in counseling.       This document has been electronically signed by Lalo Camejo MD August 3, 2020 10:17      "

## 2020-08-14 LAB
CAHPO4 CRY STONE QL IR: 30 %
CARBONATE APATITE: 70 %
COLOR STONE: NORMAL
COMPN STONE: NORMAL
LABORATORY COMMENT REPORT: NORMAL
Lab: NORMAL
Lab: NORMAL
PHOTO: NORMAL
SIZE STONE: NORMAL MM
SPECIMEN SOURCE: NORMAL
WT STONE: 14 MG

## 2020-09-03 ENCOUNTER — OFFICE VISIT (OUTPATIENT)
Dept: UROLOGY | Facility: CLINIC | Age: 70
End: 2020-09-03

## 2020-09-03 VITALS — TEMPERATURE: 98.1 F | HEIGHT: 68 IN | BODY MASS INDEX: 24.49 KG/M2 | WEIGHT: 161.6 LBS

## 2020-09-03 DIAGNOSIS — N20.0 KIDNEY STONE: ICD-10-CM

## 2020-09-03 DIAGNOSIS — R53.83 LETHARGY: ICD-10-CM

## 2020-09-03 DIAGNOSIS — N42.9 DISORDER OF PROSTATE: ICD-10-CM

## 2020-09-03 DIAGNOSIS — R39.12 BENIGN PROSTATIC HYPERPLASIA WITH WEAK URINARY STREAM: Primary | ICD-10-CM

## 2020-09-03 DIAGNOSIS — R53.83 OTHER FATIGUE: ICD-10-CM

## 2020-09-03 DIAGNOSIS — R79.89 LOW TESTOSTERONE: ICD-10-CM

## 2020-09-03 DIAGNOSIS — N40.1 BENIGN PROSTATIC HYPERPLASIA WITH WEAK URINARY STREAM: Primary | ICD-10-CM

## 2020-09-03 PROCEDURE — 84403 ASSAY OF TOTAL TESTOSTERONE: CPT | Performed by: UROLOGY

## 2020-09-03 PROCEDURE — 84443 ASSAY THYROID STIM HORMONE: CPT | Performed by: UROLOGY

## 2020-09-03 PROCEDURE — 36415 COLL VENOUS BLD VENIPUNCTURE: CPT | Performed by: UROLOGY

## 2020-09-03 PROCEDURE — 84153 ASSAY OF PSA TOTAL: CPT | Performed by: UROLOGY

## 2020-09-03 PROCEDURE — 99214 OFFICE O/P EST MOD 30 MIN: CPT | Performed by: UROLOGY

## 2020-09-03 NOTE — PROGRESS NOTES
Chief Complaint:          Kidney stones    HPI:   70 y.o. male.  The patient has had 2 stones in his lifetime and the stone composition is 70% carbonate appetite and 30% calcium hydrogen phosphate.  HPI      Past Medical History:        Past Medical History:   Diagnosis Date   • Anemia    • Arthritis    • Blood clot in abdominal vein    • BPH (benign prostatic hypertrophy)    • CHF (congestive heart failure) (CMS/[facility])    • Diabetes (CMS/[facility])    • Disease of thyroid gland    • Elevated PSA    • GERD (gastroesophageal reflux disease)    • H/O blood clots    • History of transfusion    • MI (myocardial infarction) (CMS/[facility])          Current Meds:     Current Outpatient Medications   Medication Sig Dispense Refill   • aspirin 81 MG EC tablet Take 81 mg by mouth Daily.     • tamsulosin (FLOMAX) 0.4 MG capsule 24 hr capsule 1 capsule Daily.  5     No current facility-administered medications for this visit.         Allergies:      Allergies   Allergen Reactions   • Reglan [Metoclopramide] Rash     In mouth and throat        Past Surgical History:     Past Surgical History:   Procedure Laterality Date   • ABDOMINAL SURGERY     • ABDOMINAL WASHOUT     • CARDIAC CATHETERIZATION     • CHOLECYSTECTOMY     • CIRCUMCISION N/A 1/31/2020    Procedure: CIRCUMCISION;  Surgeon: Lalo Camejo MD;  Location: [facility];  Service: Urology   • COLONOSCOPY     • ENDOSCOPY     • EXTRACORPOREAL SHOCK WAVE LITHOTRIPSY (ESWL) Left 1/31/2020    Procedure: EXTRACORPOREAL SHOCKWAVE LITHOTRIPSY;  Surgeon: Lalo Camejo MD;  Location: [facility];  Service: Urology   • EYE SURGERY Bilateral     iol   • HERNIA REPAIR     • KIDNEY STONE SURGERY           Social History:     Social History     Socioeconomic History   • Marital status:      Spouse name: Not on file   • Number of children: Not on file   • Years of education: Not on file   • Highest education level: Not on file   Tobacco Use   • Smoking status: Former Smoker   •  Smokeless tobacco: Never Used   Substance and Sexual Activity   • Alcohol use: No   • Drug use: No   • Sexual activity: Defer       Family History:     Family History   Problem Relation Age of Onset   • Heart disease Father    • Heart failure Mother    • Stroke Brother        Review of Systems:     Review of Systems   Constitutional: Negative for chills, fatigue and fever.   HENT: Negative for congestion and sinus pressure.    Respiratory: Negative for chest tightness and shortness of breath.    Cardiovascular: Negative for chest pain.   Gastrointestinal: Negative for abdominal pain, constipation, diarrhea, nausea and vomiting.   Genitourinary: Negative for flank pain, frequency, hematuria and urgency.   Musculoskeletal: Negative for back pain and neck pain.   Skin: Negative for rash.   Neurological: Negative for dizziness and headaches.   Hematological: Does not bruise/bleed easily.   Psychiatric/Behavioral: The patient is not nervous/anxious.          Physical Exam:     Physical Exam   Constitutional: He is oriented to person, place, and time.   HENT:   Head: Normocephalic and atraumatic.   Right Ear: External ear normal.   Left Ear: External ear normal.   Nose: Nose normal.   Mouth/Throat: Oropharynx is clear and moist.   Eyes: Pupils are equal, round, and reactive to light. Conjunctivae and EOM are normal.   Neck: Normal range of motion. Neck supple. No thyromegaly present.   Cardiovascular: Normal rate, regular rhythm, normal heart sounds and intact distal pulses.   No murmur heard.  Pulmonary/Chest: Effort normal and breath sounds normal. No respiratory distress. He has no wheezes. He has no rales. He exhibits no tenderness.   Abdominal: Soft. Bowel sounds are normal. He exhibits no distension and no mass. There is no tenderness. No hernia.   Genitourinary: Rectum normal, prostate normal and penis normal.   Musculoskeletal: Normal range of motion. He exhibits no edema or tenderness.   Lymphadenopathy:     He  "has no cervical adenopathy.   Neurological: He is alert and oriented to person, place, and time. No cranial nerve deficit. He exhibits normal muscle tone. Coordination normal.   Skin: Skin is warm. No rash noted.   Psychiatric: He has a normal mood and affect. His behavior is normal. Judgment and thought content normal.   Nursing note and vitals reviewed.      Temp 98.1 °F (36.7 °C)   Ht 172.7 cm (68\")   Wt 73.3 kg (161 lb 9.6 oz)   BMI 24.57 kg/m²    Procedure:         Assessment:     Encounter Diagnoses   Name Primary?   • Benign prostatic hyperplasia with weak urinary stream Yes   • Disorder of prostate    • Low testosterone    • Other fatigue    • Lethargy    • Kidney stone        Orders Placed This Encounter   Procedures   • XR Abdomen KUB     History of stones     Standing Status:   Future     Standing Expiration Date:   9/3/2021   • Testosterone   • TSH   • PSA DIAGNOSTIC   • PSA DIAGNOSTIC     Standing Status:   Future     Standing Expiration Date:   9/3/2021   • TSH     Standing Status:   Future     Standing Expiration Date:   9/3/2021       Patient reports that he is not currently experiencing any symptoms of urinary incontinence.      Plan:   The patient complains of lethargy and we will send off a testosterone and a TSH on him he has had problems with blood clots in 2015 and so may not be a candidate for hormone replacement therapy we have also ordered a PSA and will see him back in a month in regards to his stones will see him back in 6 months with a KUB    Patient's Body mass index is 24.57 kg/m². BMI is within normal parameters. No follow-up required..      Smoking Cessation Counseling:  Former smoker.  Patient does not currently use any tobacco products.       Counseling was given to patient for the following topics impressions as follows: lethargy and hx of DVT's and stones. The interim medical history and current results were reviewed.  A treatment plan with follow-up was made for Benign " prostatic hyperplasia with weak urinary stream [N40.1, R39.12]. I spent 35 minutes face to face with Cecil Gomez and 80 percentage was spent in counseling.       This document has been electronically signed by Lalo Camejo MD September 8, 2020 10:32

## 2020-09-04 LAB
PSA SERPL-MCNC: 0.27 NG/ML (ref 0–4)
TESTOST SERPL-MCNC: 7.61 NG/DL (ref 193–740)
TSH SERPL DL<=0.05 MIU/L-ACNC: 1.92 UIU/ML (ref 0.27–4.2)

## 2020-09-08 ENCOUNTER — TELEPHONE (OUTPATIENT)
Dept: UROLOGY | Facility: CLINIC | Age: 70
End: 2020-09-08

## 2020-09-08 NOTE — TELEPHONE ENCOUNTER
I called the pt and let him know all his labs looked good except his Testosterone and it was low I left a message for him to call us and get up an appt anytime if he wanted to discuss that with Dr Camejo

## 2020-12-18 ENCOUNTER — CONSULT (OUTPATIENT)
Dept: GASTROENTEROLOGY | Facility: CLINIC | Age: 70
End: 2020-12-18

## 2020-12-18 ENCOUNTER — TELEPHONE (OUTPATIENT)
Dept: GASTROENTEROLOGY | Facility: CLINIC | Age: 70
End: 2020-12-18

## 2020-12-18 VITALS
SYSTOLIC BLOOD PRESSURE: 136 MMHG | WEIGHT: 165 LBS | OXYGEN SATURATION: 95 % | HEIGHT: 68 IN | HEART RATE: 61 BPM | BODY MASS INDEX: 25.01 KG/M2 | DIASTOLIC BLOOD PRESSURE: 70 MMHG

## 2020-12-18 DIAGNOSIS — R12 HEARTBURN: ICD-10-CM

## 2020-12-18 DIAGNOSIS — Z01.818 PREOPERATIVE CLEARANCE: Primary | ICD-10-CM

## 2020-12-18 DIAGNOSIS — D64.9 ANEMIA, UNSPECIFIED TYPE: ICD-10-CM

## 2020-12-18 DIAGNOSIS — Z86.010 HISTORY OF ADENOMATOUS POLYP OF COLON: ICD-10-CM

## 2020-12-18 DIAGNOSIS — R13.19 ESOPHAGEAL DYSPHAGIA: ICD-10-CM

## 2020-12-18 DIAGNOSIS — R13.19 ESOPHAGEAL DYSPHAGIA: Primary | ICD-10-CM

## 2020-12-18 PROCEDURE — 99204 OFFICE O/P NEW MOD 45 MIN: CPT | Performed by: PHYSICIAN ASSISTANT

## 2020-12-18 RX ORDER — BISACODYL 5 MG
TABLET, DELAYED RELEASE (ENTERIC COATED) ORAL
Qty: 4 TABLET | Refills: 0 | Status: SHIPPED | OUTPATIENT
Start: 2020-12-18 | End: 2021-07-29

## 2020-12-18 RX ORDER — POLYETHYLENE GLYCOL 3350, SODIUM CHLORIDE, SODIUM BICARBONATE, POTASSIUM CHLORIDE 420; 11.2; 5.72; 1.48 G/4L; G/4L; G/4L; G/4L
4000 POWDER, FOR SOLUTION ORAL ONCE
Qty: 4000 ML | Refills: 0 | Status: SHIPPED | OUTPATIENT
Start: 2020-12-18 | End: 2020-12-18 | Stop reason: SDDI

## 2020-12-18 RX ORDER — POLYETHYLENE GLYCOL 3350 17 G/17G
POWDER, FOR SOLUTION ORAL
Qty: 510 G | Refills: 0 | Status: SHIPPED | OUTPATIENT
Start: 2020-12-18 | End: 2021-07-29

## 2020-12-18 RX ORDER — OMEPRAZOLE 40 MG/1
40 CAPSULE, DELAYED RELEASE ORAL DAILY
Qty: 30 CAPSULE | Refills: 5 | Status: SHIPPED | OUTPATIENT
Start: 2020-12-18 | End: 2021-07-20

## 2020-12-18 RX ORDER — DIPHENOXYLATE HYDROCHLORIDE AND ATROPINE SULFATE 2.5; .025 MG/1; MG/1
TABLET ORAL DAILY
COMMUNITY
End: 2021-07-29

## 2020-12-18 NOTE — PROGRESS NOTES
: 1950    Chief Complaint   Patient presents with   • Anemia   • Difficulty Swallowing       Cecil Gomez is a 70 y.o. male who presents to the office today as a new patient for evaluation of Anemia and Difficulty Swallowing.    History of Present Illness:  Patient reports that since , he has had dysphagia with foods.  He has heartburn but it only occurs rarely.  He takes Prilosec over-the-counter at home but only as needed.  He has history of severe illness and intubation in 2015.  Has had fluctuation of his bowel habits from constipation to diarrhea over the years.  Denies any rectal bleeding or melena.  Was recently told that he is anemic but does not have any recent lab results with him.  His last colonoscopy was in  and he did have precancerous colon polyps, was directed to have a colonoscopy a couple of years later but was unable to do so due to his severe illness. There is no known family history of colon cancer or colon polyps.    Review of Systems   Constitutional: Positive for fatigue. Negative for chills and fever.   HENT: Positive for trouble swallowing.    Eyes: Negative.    Respiratory: Negative for cough, choking, chest tightness and shortness of breath.    Cardiovascular: Negative for chest pain.   Gastrointestinal: Negative for abdominal distention, abdominal pain, anal bleeding, blood in stool, constipation, diarrhea, nausea and vomiting.   Endocrine: Negative.    Genitourinary: Negative for difficulty urinating.   Musculoskeletal: Positive for neck pain. Negative for back pain.   Skin: Negative.    Allergic/Immunologic: Negative for environmental allergies and food allergies.   Neurological: Positive for dizziness and headaches. Negative for light-headedness.   Hematological: Bruises/bleeds easily.   Psychiatric/Behavioral: Negative.      I have reviewed and confirmed the accuracy of the ROS as documented by the MA/LPN/RN Elizabeth Bello PA-C    Past Medical History:   Diagnosis  "Date   • Anemia    • Arthritis    • Blood clot in abdominal vein    • BPH (benign prostatic hypertrophy)    • CHF (congestive heart failure) (CMS/[facility])    • Diabetes (CMS/[facility])    • Disease of thyroid gland    • Elevated PSA    • GERD (gastroesophageal reflux disease)    • H/O blood clots    • History of transfusion    • MI (myocardial infarction) (CMS/[facility])        Past Surgical History:   Procedure Laterality Date   • ABDOMINAL SURGERY     • ABDOMINAL WASHOUT     • CARDIAC CATHETERIZATION     • CHOLECYSTECTOMY     • CIRCUMCISION N/A 1/31/2020    Procedure: CIRCUMCISION;  Surgeon: Lalo Camejo MD;  Location: [facility];  Service: Urology   • COLONOSCOPY  2013    pre- cancerous colon polyp   • ENDOSCOPY     • EXTRACORPOREAL SHOCK WAVE LITHOTRIPSY (ESWL) Left 1/31/2020    Procedure: EXTRACORPOREAL SHOCKWAVE LITHOTRIPSY;  Surgeon: Lalo Camejo MD;  Location: [facility];  Service: Urology   • EYE SURGERY Bilateral     iol   • HERNIA REPAIR     • KIDNEY STONE SURGERY         Family History   Problem Relation Age of Onset   • Heart disease Father    • Heart failure Mother    • Stroke Brother        Social History     Socioeconomic History   • Marital status:      Spouse name: Not on file   • Number of children: Not on file   • Years of education: Not on file   • Highest education level: Not on file   Tobacco Use   • Smoking status: Former Smoker   • Smokeless tobacco: Never Used   Substance and Sexual Activity   • Alcohol use: No   • Drug use: No   • Sexual activity: Defer       Current Outpatient Medications:   •  aspirin 81 MG EC tablet, Take 81 mg by mouth Daily., Disp: , Rfl:   •  multivitamin (MULTI-VITAMIN DAILY PO), Take  by mouth Daily., Disp: , Rfl:     Allergies:   Reglan [metoclopramide]    Vitals:  /70 (BP Location: Left arm, Patient Position: Sitting, Cuff Size: Adult)   Pulse 61   Ht 172.7 cm (68\")   Wt 74.8 kg (165 lb)   SpO2 95%   BMI 25.09 kg/m²     Physical Exam  Vitals " signs reviewed.   Constitutional:       General: He is not in acute distress.     Appearance: Normal appearance. He is well-developed. He is not ill-appearing or diaphoretic.   HENT:      Head: Normocephalic and atraumatic.      Right Ear: External ear normal.      Left Ear: External ear normal.      Nose: Nose normal.      Mouth/Throat:      Comments: Wearing a mask  Eyes:      General: No scleral icterus.        Right eye: No discharge.         Left eye: No discharge.      Conjunctiva/sclera: Conjunctivae normal.   Neck:      Musculoskeletal: Normal range of motion.      Vascular: No JVD.   Cardiovascular:      Rate and Rhythm: Normal rate and regular rhythm.      Heart sounds: Normal heart sounds. No murmur. No friction rub. No gallop.    Pulmonary:      Effort: Pulmonary effort is normal. No respiratory distress.      Breath sounds: Normal breath sounds. No wheezing or rales.   Chest:      Chest wall: No tenderness.   Abdominal:      General: Bowel sounds are normal. There is no distension.      Palpations: Abdomen is soft. There is no mass.      Tenderness: There is abdominal tenderness (generalized, mild). There is no guarding.      Comments: Significant diastasis rectus noted   Musculoskeletal: Normal range of motion.         General: No deformity.   Skin:     General: Skin is warm and dry.      Findings: No erythema or rash.   Neurological:      Mental Status: He is alert and oriented to person, place, and time.      Coordination: Coordination normal.   Psychiatric:         Mood and Affect: Mood normal.         Behavior: Behavior normal.         Thought Content: Thought content normal.         Judgment: Judgment normal.       Assessment:  1. Esophageal dysphagia    2. Heartburn    3. Anemia, unspecified type    4. History of adenomatous polyp of colon      Plan:  Orders Placed This Encounter   Procedures   • Follow Anesthesia Guidelines / Standing Orders   • Obtain Informed Consent      ESOPHAGOGASTRODUODENOSCOPY WITH DILATATION CPT CODE: 69816 (N/A), COLONOSCOPY FOR SCREENING CPT CODE:  (N/A)  He will need an esophagogastroduodenoscopy with possible dilatation of the esophagus and a colonoscopy performed with IV general sedation. All of the risks, benefits and alternatives of these procedures have been discussed with him, all of his questions have been answered and he has elected to proceed. He should follow up in the office after these procedures to discuss the results and further recommendations can be made at that time.    New Medications Ordered This Visit   Medications   • omeprazole (priLOSEC) 40 MG capsule     Sig: Take 1 capsule by mouth Daily.     Dispense:  30 capsule     Refill:  5     He was instructed not to lie down immediately after eating (wait at least 3 hours after meals), elevate the head of the bed at night, avoid spicy foods, avoid mints, avoid caffeine, avoid nicotine and maintain a healthy weight. He will start taking omeprazole 40 mg once daily 30 minutes before a meal for treatment of contributing GERD.         Return for follow up after procedure to discuss results.      Electronically signed 12/18/2020 15:56 JOHN Bello PA-C  [facility]

## 2021-07-19 DIAGNOSIS — R12 HEARTBURN: ICD-10-CM

## 2021-07-20 RX ORDER — OMEPRAZOLE 40 MG/1
CAPSULE, DELAYED RELEASE ORAL
Qty: 30 CAPSULE | Refills: 5 | Status: SHIPPED | OUTPATIENT
Start: 2021-07-20 | End: 2022-05-16 | Stop reason: ALTCHOICE

## 2021-07-29 ENCOUNTER — OFFICE VISIT (OUTPATIENT)
Dept: GASTROENTEROLOGY | Facility: CLINIC | Age: 71
End: 2021-07-29

## 2021-07-29 VITALS
HEIGHT: 68 IN | SYSTOLIC BLOOD PRESSURE: 121 MMHG | HEART RATE: 58 BPM | DIASTOLIC BLOOD PRESSURE: 68 MMHG | WEIGHT: 162.4 LBS | BODY MASS INDEX: 24.61 KG/M2

## 2021-07-29 DIAGNOSIS — D64.9 ANEMIA, UNSPECIFIED TYPE: Primary | ICD-10-CM

## 2021-07-29 DIAGNOSIS — R12 HEARTBURN: ICD-10-CM

## 2021-07-29 DIAGNOSIS — Z86.010 PERSONAL HISTORY OF COLONIC POLYPS: ICD-10-CM

## 2021-07-29 DIAGNOSIS — R13.19 ESOPHAGEAL DYSPHAGIA: ICD-10-CM

## 2021-07-29 PROCEDURE — 99214 OFFICE O/P EST MOD 30 MIN: CPT | Performed by: INTERNAL MEDICINE

## 2021-07-29 PROCEDURE — 85025 COMPLETE CBC W/AUTO DIFF WBC: CPT | Performed by: INTERNAL MEDICINE

## 2021-07-29 PROCEDURE — 80053 COMPREHEN METABOLIC PANEL: CPT | Performed by: INTERNAL MEDICINE

## 2021-07-29 PROCEDURE — 83540 ASSAY OF IRON: CPT | Performed by: INTERNAL MEDICINE

## 2021-07-29 PROCEDURE — 82746 ASSAY OF FOLIC ACID SERUM: CPT | Performed by: INTERNAL MEDICINE

## 2021-07-29 PROCEDURE — 84466 ASSAY OF TRANSFERRIN: CPT | Performed by: INTERNAL MEDICINE

## 2021-07-29 PROCEDURE — 82607 VITAMIN B-12: CPT | Performed by: INTERNAL MEDICINE

## 2021-07-29 RX ORDER — FLUTICASONE PROPIONATE 50 MCG
2 SPRAY, SUSPENSION (ML) NASAL DAILY
COMMUNITY
End: 2022-05-16 | Stop reason: ALTCHOICE

## 2021-07-29 RX ORDER — LORATADINE 10 MG/1
CAPSULE, LIQUID FILLED ORAL
COMMUNITY
End: 2022-05-16 | Stop reason: ALTCHOICE

## 2021-07-29 RX ORDER — POLYETHYLENE GLYCOL 3350 17 G/17G
POWDER, FOR SOLUTION ORAL
Qty: 510 G | Refills: 0 | Status: SHIPPED | OUTPATIENT
Start: 2021-07-29 | End: 2021-08-25 | Stop reason: HOSPADM

## 2021-07-29 NOTE — PROGRESS NOTES
"Subjective   Cecil Gomez is a 71 y.o. male who presents to the office today as a follow up appointment regarding Anemia      History of Present Illness:  The patient presents for evaluation of anemia.  He had a blood clot in 2015 in the intestinal vasculature for which he underwent a procedure at .  Apparently,  never found the blood clot.  He has been anemic in the past couple of years.  He had a colonoscopy in 2014 at which time a \"precancerous polyp\".  He was told that he was to return for a repeat colonoscopy but he did not have this done b/c of the \"blood clot\" in the bowel.  He is currently on ASA only.  He states he feels pretty good now.  No recent blood transfusions (did have blood transfusions in 2015 and 2016).  He has lost about 9 lbs in the past month.  The patient was told that he was prediabetic and needed to lose weight.  He has been on a diet eliminating sweets, breads and potatoes.  He is eating smaller meals.  He is not drinking soda pop anymore.  No family history of colon cancer.  Of note, he has had a food impaction in 2019 which passed on it's own after 19 hours.  He then saw Elizabeth Bello and started the Prilosec 40 mg once daily.  Since starting the Prilosec, he has not had further issues with food impactions.      Review of Systems:  Review of Systems   Constitutional: Positive for fatigue. Negative for chills and fever.   HENT: Negative for trouble swallowing.    Eyes: Negative.    Respiratory: Negative for cough, choking, chest tightness and shortness of breath.    Cardiovascular: Negative for chest pain.   Gastrointestinal: Positive for abdominal pain and constipation. Negative for abdominal distention, anal bleeding, blood in stool, diarrhea, nausea and vomiting.   Endocrine: Negative.    Genitourinary: Negative for difficulty urinating and hematuria.   Musculoskeletal: Positive for neck pain. Negative for back pain.   Skin: Negative.    Allergic/Immunologic: Negative for " environmental allergies and food allergies.   Neurological: Positive for dizziness and headaches. Negative for light-headedness.   Hematological: Bruises/bleeds easily.   Psychiatric/Behavioral: Negative.        Past Medical History:  Past Medical History:   Diagnosis Date   • Anemia    • Arthritis    • Blood clot in abdominal vein    • BPH (benign prostatic hypertrophy)    • CHF (congestive heart failure) (CMS/HCC)    • Colon polyp    • Diabetes (CMS/HCC)    • Disease of thyroid gland    • Elevated PSA    • GERD (gastroesophageal reflux disease)    • H/O blood clots    • History of transfusion    • MI (myocardial infarction) (CMS/HCC)        Past Surgical History:  Past Surgical History:   Procedure Laterality Date   • ABDOMINAL SURGERY     • ABDOMINAL WASHOUT     • CARDIAC CATHETERIZATION     • CHOLECYSTECTOMY     • CIRCUMCISION N/A 1/31/2020    Procedure: CIRCUMCISION;  Surgeon: Lalo Camejo MD;  Location: [facility];  Service: Urology   • COLONOSCOPY  2013    pre- cancerous colon polyp   • ENDOSCOPY     • EXTRACORPOREAL SHOCK WAVE LITHOTRIPSY (ESWL) Left 1/31/2020    Procedure: EXTRACORPOREAL SHOCKWAVE LITHOTRIPSY;  Surgeon: Lalo Camejo MD;  Location: [facility];  Service: Urology   • EYE SURGERY Bilateral     iol   • HERNIA REPAIR     • KIDNEY STONE SURGERY         Family History:  Family History   Problem Relation Age of Onset   • Heart disease Father    • Heart failure Mother    • Stroke Brother        Social History:  Social History     Socioeconomic History   • Marital status:      Spouse name: Not on file   • Number of children: Not on file   • Years of education: Not on file   • Highest education level: Not on file   Tobacco Use   • Smoking status: Former Smoker   • Smokeless tobacco: Never Used   Substance and Sexual Activity   • Alcohol use: No   • Drug use: No   • Sexual activity: Defer       Current Medication List:    Current Outpatient Medications:   •  aspirin 81 MG EC  "tablet, Take 81 mg by mouth Daily., Disp: , Rfl:   •  fluticasone (FLONASE) 50 MCG/ACT nasal spray, 2 sprays into the nostril(s) as directed by provider Daily., Disp: , Rfl:   •  Loratadine 10 MG capsule, Take  by mouth., Disp: , Rfl:   •  omeprazole (priLOSEC) 40 MG capsule, TAKE 1 CAPSULE ONCE DAILY, Disp: 30 capsule, Rfl: 5  •  polyethylene glycol (MIRALAX) 17 GM/SCOOP powder, Take 255 g of MiraLAX with 32 ounces liquid then repeat 8 hours later for MiraLAX cleanout., Disp: 510 g, Rfl: 0    Allergies:   Reglan [metoclopramide]    Vitals:  /68 (BP Location: Left arm, Patient Position: Sitting, Cuff Size: Adult)   Pulse 58   Ht 172.7 cm (68\")   Wt 73.7 kg (162 lb 6.4 oz)   BMI 24.69 kg/m²     Physical Exam:  Physical Exam  Constitutional:       Appearance: He is normal weight.   HENT:      Head: Normocephalic and atraumatic.      Nose: Nose normal. No congestion or rhinorrhea.   Eyes:      General: No scleral icterus.     Extraocular Movements: Extraocular movements intact.      Conjunctiva/sclera: Conjunctivae normal.      Pupils: Pupils are equal, round, and reactive to light.   Cardiovascular:      Rate and Rhythm: Normal rate and regular rhythm.      Pulses: Normal pulses.      Heart sounds: Normal heart sounds.   Pulmonary:      Effort: Pulmonary effort is normal.      Breath sounds: Normal breath sounds.   Abdominal:      General: Abdomen is flat. Bowel sounds are normal. There is no distension.      Palpations: Abdomen is soft. There is no shifting dullness, fluid wave, hepatomegaly, splenomegaly, mass or pulsatile mass.      Tenderness: There is no abdominal tenderness. There is no guarding or rebound.      Hernia: No hernia is present.   Musculoskeletal:         General: No swelling or tenderness.      Cervical back: Normal range of motion and neck supple.   Skin:     General: Skin is warm and dry.      Coloration: Skin is not jaundiced.   Neurological:      General: No focal deficit present.     "  Mental Status: He is alert and oriented to person, place, and time.   Psychiatric:         Mood and Affect: Mood normal.         Behavior: Behavior normal.         Results Review:  Lab Results:   No visits with results within 1 Month(s) from this visit.   Latest known visit with results is:   Office Visit on 09/03/2020   Component Date Value Ref Range Status   • Testosterone, Total 09/03/2020 7.61* 193.00 - 740.00 ng/dL Final   • TSH 09/03/2020 1.920  0.270 - 4.200 uIU/mL Final   • PSA 09/03/2020 0.266  0.000 - 4.000 ng/mL Final       Assessment/Plan     Visit Diagnoses:    ICD-10-CM ICD-9-CM   1. Anemia, unspecified type  D64.9 285.9   2. Personal history of colonic polyps  Z86.010 V12.72   3. Esophageal dysphagia  R13.10 787.20   4. Heartburn  R12 787.1       Plan:  Orders Placed This Encounter   Procedures   • Iron Profile   • Vitamin B12 & Folate   • Comprehensive Metabolic Panel   • CBC & Differential       ESOPHAGOGASTRODUODENOSCOPY WITH BIOPSY (N/A), COLONOSCOPY FOR SCREENING (N/A)  The patient will undergo EGD and colonoscopy due to history of dysphagia and personal history of colon polyps.  He was supposed to have a colonoscopy 6 months after his last one in 2014 but did not do this due to an issue with a blood clot somewhere in his mesentery that could not be found by general surgeons at  upon opening up his abdomen and searching per his report.  I will labs drawn today to evaluate his anemia and the severity.  I will go ahead and proceed with a EGD and colonoscopy.    MEDICATION ISSUES:  Discussed medication options and treatment plan of prescribed medication as well as the risks, benefits, and side effects including potential falls, possible impaired driving and metabolic adversities among others. Patient is agreeable to call the office with any worsening of symptoms or onset of side effects. Patient is agreeable to call 911 or go to the nearest ER should he/she begin having SI/HI.     MEDS ORDERED  DURING VISIT:  New Medications Ordered This Visit   Medications   • polyethylene glycol (MIRALAX) 17 GM/SCOOP powder     Sig: Take 255 g of MiraLAX with 32 ounces liquid then repeat 8 hours later for MiraLAX cleanout.     Dispense:  510 g     Refill:  0       The patient will follow up after his procedures.             This document has been electronically signed by Melvina Martinez MD   July 29, 2021 11:40 EDT      Part of this note may be an electronic transcription/translation of spoken language to printed text using the Dragon Dictation System.

## 2021-07-30 LAB
ALBUMIN SERPL-MCNC: 4.1 G/DL (ref 3.5–5.2)
ALBUMIN/GLOB SERPL: 1.1 G/DL
ALP SERPL-CCNC: 95 U/L (ref 39–117)
ALT SERPL W P-5'-P-CCNC: 29 U/L (ref 1–41)
ANION GAP SERPL CALCULATED.3IONS-SCNC: 8.7 MMOL/L (ref 5–15)
AST SERPL-CCNC: 24 U/L (ref 1–40)
BASOPHILS # BLD AUTO: 0.02 10*3/MM3 (ref 0–0.2)
BASOPHILS NFR BLD AUTO: 0.4 % (ref 0–1.5)
BILIRUB SERPL-MCNC: 0.4 MG/DL (ref 0–1.2)
BUN SERPL-MCNC: 19 MG/DL (ref 8–23)
BUN/CREAT SERPL: 16.4 (ref 7–25)
CALCIUM SPEC-SCNC: 9.6 MG/DL (ref 8.6–10.5)
CHLORIDE SERPL-SCNC: 107 MMOL/L (ref 98–107)
CO2 SERPL-SCNC: 25.3 MMOL/L (ref 22–29)
CREAT SERPL-MCNC: 1.16 MG/DL (ref 0.76–1.27)
DEPRECATED RDW RBC AUTO: 42.7 FL (ref 37–54)
EOSINOPHIL # BLD AUTO: 0.04 10*3/MM3 (ref 0–0.4)
EOSINOPHIL NFR BLD AUTO: 0.9 % (ref 0.3–6.2)
ERYTHROCYTE [DISTWIDTH] IN BLOOD BY AUTOMATED COUNT: 13.8 % (ref 12.3–15.4)
FOLATE SERPL-MCNC: 7.95 NG/ML (ref 4.78–24.2)
GFR SERPL CREATININE-BSD FRML MDRD: 62 ML/MIN/1.73
GLOBULIN UR ELPH-MCNC: 3.6 GM/DL
GLUCOSE SERPL-MCNC: 108 MG/DL (ref 65–99)
HCT VFR BLD AUTO: 41.1 % (ref 37.5–51)
HGB BLD-MCNC: 12.8 G/DL (ref 13–17.7)
IMM GRANULOCYTES # BLD AUTO: 0.01 10*3/MM3 (ref 0–0.05)
IMM GRANULOCYTES NFR BLD AUTO: 0.2 % (ref 0–0.5)
IRON 24H UR-MRATE: 69 MCG/DL (ref 59–158)
IRON SATN MFR SERPL: 22 % (ref 20–50)
LYMPHOCYTES # BLD AUTO: 1.77 10*3/MM3 (ref 0.7–3.1)
LYMPHOCYTES NFR BLD AUTO: 38.4 % (ref 19.6–45.3)
MCH RBC QN AUTO: 26.4 PG (ref 26.6–33)
MCHC RBC AUTO-ENTMCNC: 31.1 G/DL (ref 31.5–35.7)
MCV RBC AUTO: 84.7 FL (ref 79–97)
MONOCYTES # BLD AUTO: 0.33 10*3/MM3 (ref 0.1–0.9)
MONOCYTES NFR BLD AUTO: 7.2 % (ref 5–12)
NEUTROPHILS NFR BLD AUTO: 2.44 10*3/MM3 (ref 1.7–7)
NEUTROPHILS NFR BLD AUTO: 52.9 % (ref 42.7–76)
NRBC BLD AUTO-RTO: 0 /100 WBC (ref 0–0.2)
PLATELET # BLD AUTO: 182 10*3/MM3 (ref 140–450)
PMV BLD AUTO: 11.6 FL (ref 6–12)
POTASSIUM SERPL-SCNC: 4.9 MMOL/L (ref 3.5–5.2)
PROT SERPL-MCNC: 7.7 G/DL (ref 6–8.5)
RBC # BLD AUTO: 4.85 10*6/MM3 (ref 4.14–5.8)
SODIUM SERPL-SCNC: 141 MMOL/L (ref 136–145)
TIBC SERPL-MCNC: 308 MCG/DL (ref 298–536)
TRANSFERRIN SERPL-MCNC: 207 MG/DL (ref 200–360)
VIT B12 BLD-MCNC: 466 PG/ML (ref 211–946)
WBC # BLD AUTO: 4.61 10*3/MM3 (ref 3.4–10.8)

## 2021-08-03 DIAGNOSIS — Z01.818 PREOPERATIVE CLEARANCE: ICD-10-CM

## 2021-08-03 DIAGNOSIS — D64.9 ANEMIA, UNSPECIFIED TYPE: ICD-10-CM

## 2021-08-03 DIAGNOSIS — R12 HEARTBURN: Primary | ICD-10-CM

## 2021-08-03 DIAGNOSIS — R13.19 ESOPHAGEAL DYSPHAGIA: ICD-10-CM

## 2021-08-03 DIAGNOSIS — Z86.010 PERSONAL HISTORY OF COLONIC POLYPS: ICD-10-CM

## 2021-08-16 PROBLEM — D64.9 ANEMIA: Status: ACTIVE | Noted: 2021-08-16

## 2021-08-16 PROBLEM — Z86.010 PERSONAL HISTORY OF COLONIC POLYPS: Status: ACTIVE | Noted: 2021-08-16

## 2021-08-16 PROBLEM — Z86.0100 PERSONAL HISTORY OF COLONIC POLYPS: Status: ACTIVE | Noted: 2021-08-16

## 2021-08-23 ENCOUNTER — LAB (OUTPATIENT)
Dept: LAB | Facility: HOSPITAL | Age: 71
End: 2021-08-23

## 2021-08-23 DIAGNOSIS — R12 HEARTBURN: ICD-10-CM

## 2021-08-23 DIAGNOSIS — D64.9 ANEMIA, UNSPECIFIED TYPE: ICD-10-CM

## 2021-08-23 DIAGNOSIS — R13.19 ESOPHAGEAL DYSPHAGIA: ICD-10-CM

## 2021-08-23 DIAGNOSIS — Z86.010 PERSONAL HISTORY OF COLONIC POLYPS: ICD-10-CM

## 2021-08-23 DIAGNOSIS — Z01.818 PREOPERATIVE CLEARANCE: ICD-10-CM

## 2021-08-23 PROCEDURE — U0004 COV-19 TEST NON-CDC HGH THRU: HCPCS

## 2021-08-23 PROCEDURE — C9803 HOPD COVID-19 SPEC COLLECT: HCPCS

## 2021-08-23 PROCEDURE — U0005 INFEC AGEN DETEC AMPLI PROBE: HCPCS

## 2021-08-24 LAB — SARS-COV-2 RNA PNL SPEC NAA+PROBE: NOT DETECTED

## 2021-08-25 ENCOUNTER — ANESTHESIA (OUTPATIENT)
Dept: PERIOP | Facility: HOSPITAL | Age: 71
End: 2021-08-25

## 2021-08-25 ENCOUNTER — ANESTHESIA EVENT (OUTPATIENT)
Dept: PERIOP | Facility: HOSPITAL | Age: 71
End: 2021-08-25

## 2021-08-25 ENCOUNTER — HOSPITAL ENCOUNTER (OUTPATIENT)
Facility: HOSPITAL | Age: 71
Setting detail: HOSPITAL OUTPATIENT SURGERY
Discharge: HOME | End: 2021-08-25
Attending: INTERNAL MEDICINE | Admitting: INTERNAL MEDICINE

## 2021-08-25 VITALS
HEART RATE: 56 BPM | WEIGHT: 150 LBS | HEIGHT: 68 IN | DIASTOLIC BLOOD PRESSURE: 72 MMHG | SYSTOLIC BLOOD PRESSURE: 162 MMHG | BODY MASS INDEX: 22.73 KG/M2 | OXYGEN SATURATION: 98 % | RESPIRATION RATE: 16 BRPM | TEMPERATURE: 98 F

## 2021-08-25 DIAGNOSIS — D64.9 ANEMIA, UNSPECIFIED TYPE: ICD-10-CM

## 2021-08-25 DIAGNOSIS — Z86.010 PERSONAL HISTORY OF COLONIC POLYPS: ICD-10-CM

## 2021-08-25 PROCEDURE — 88305 TISSUE EXAM BY PATHOLOGIST: CPT | Performed by: INTERNAL MEDICINE

## 2021-08-25 PROCEDURE — 25010000002 PROPOFOL 10 MG/ML EMULSION: Performed by: NURSE ANESTHETIST, CERTIFIED REGISTERED

## 2021-08-25 PROCEDURE — 45385 COLONOSCOPY W/LESION REMOVAL: CPT | Performed by: INTERNAL MEDICINE

## 2021-08-25 PROCEDURE — 43239 EGD BIOPSY SINGLE/MULTIPLE: CPT | Performed by: INTERNAL MEDICINE

## 2021-08-25 PROCEDURE — 45380 COLONOSCOPY AND BIOPSY: CPT | Performed by: INTERNAL MEDICINE

## 2021-08-25 RX ORDER — SODIUM CHLORIDE 0.9 % (FLUSH) 0.9 %
10 SYRINGE (ML) INJECTION EVERY 12 HOURS SCHEDULED
Status: DISCONTINUED | OUTPATIENT
Start: 2021-08-25 | End: 2021-08-25 | Stop reason: HOSPADM

## 2021-08-25 RX ORDER — DROPERIDOL 2.5 MG/ML
0.62 INJECTION, SOLUTION INTRAMUSCULAR; INTRAVENOUS ONCE AS NEEDED
Status: DISCONTINUED | OUTPATIENT
Start: 2021-08-25 | End: 2021-08-25 | Stop reason: HOSPADM

## 2021-08-25 RX ORDER — SODIUM CHLORIDE, SODIUM LACTATE, POTASSIUM CHLORIDE, CALCIUM CHLORIDE 600; 310; 30; 20 MG/100ML; MG/100ML; MG/100ML; MG/100ML
125 INJECTION, SOLUTION INTRAVENOUS ONCE
Status: COMPLETED | OUTPATIENT
Start: 2021-08-25 | End: 2021-08-25

## 2021-08-25 RX ORDER — ONDANSETRON 2 MG/ML
4 INJECTION INTRAMUSCULAR; INTRAVENOUS AS NEEDED
Status: DISCONTINUED | OUTPATIENT
Start: 2021-08-25 | End: 2021-08-25 | Stop reason: HOSPADM

## 2021-08-25 RX ORDER — SODIUM CHLORIDE, SODIUM LACTATE, POTASSIUM CHLORIDE, CALCIUM CHLORIDE 600; 310; 30; 20 MG/100ML; MG/100ML; MG/100ML; MG/100ML
100 INJECTION, SOLUTION INTRAVENOUS ONCE AS NEEDED
Status: DISCONTINUED | OUTPATIENT
Start: 2021-08-25 | End: 2021-08-25 | Stop reason: HOSPADM

## 2021-08-25 RX ORDER — LIDOCAINE HYDROCHLORIDE 20 MG/ML
INJECTION, SOLUTION INFILTRATION; PERINEURAL AS NEEDED
Status: DISCONTINUED | OUTPATIENT
Start: 2021-08-25 | End: 2021-08-25 | Stop reason: SURG

## 2021-08-25 RX ORDER — OXYCODONE HYDROCHLORIDE AND ACETAMINOPHEN 5; 325 MG/1; MG/1
1 TABLET ORAL ONCE AS NEEDED
Status: DISCONTINUED | OUTPATIENT
Start: 2021-08-25 | End: 2021-08-25 | Stop reason: HOSPADM

## 2021-08-25 RX ORDER — IPRATROPIUM BROMIDE AND ALBUTEROL SULFATE 2.5; .5 MG/3ML; MG/3ML
3 SOLUTION RESPIRATORY (INHALATION) ONCE AS NEEDED
Status: DISCONTINUED | OUTPATIENT
Start: 2021-08-25 | End: 2021-08-25 | Stop reason: HOSPADM

## 2021-08-25 RX ORDER — MEPERIDINE HYDROCHLORIDE 25 MG/ML
12.5 INJECTION INTRAMUSCULAR; INTRAVENOUS; SUBCUTANEOUS
Status: DISCONTINUED | OUTPATIENT
Start: 2021-08-25 | End: 2021-08-25 | Stop reason: HOSPADM

## 2021-08-25 RX ORDER — SODIUM CHLORIDE 0.9 % (FLUSH) 0.9 %
10 SYRINGE (ML) INJECTION AS NEEDED
Status: DISCONTINUED | OUTPATIENT
Start: 2021-08-25 | End: 2021-08-25 | Stop reason: HOSPADM

## 2021-08-25 RX ORDER — GLYCOPYRROLATE 0.2 MG/ML
INJECTION INTRAMUSCULAR; INTRAVENOUS AS NEEDED
Status: DISCONTINUED | OUTPATIENT
Start: 2021-08-25 | End: 2021-08-25 | Stop reason: SURG

## 2021-08-25 RX ORDER — PROPOFOL 10 MG/ML
VIAL (ML) INTRAVENOUS AS NEEDED
Status: DISCONTINUED | OUTPATIENT
Start: 2021-08-25 | End: 2021-08-25 | Stop reason: SURG

## 2021-08-25 RX ORDER — FENTANYL CITRATE 50 UG/ML
50 INJECTION, SOLUTION INTRAMUSCULAR; INTRAVENOUS
Status: DISCONTINUED | OUTPATIENT
Start: 2021-08-25 | End: 2021-08-25 | Stop reason: HOSPADM

## 2021-08-25 RX ADMIN — PROPOFOL 50 MG: 10 INJECTION, EMULSION INTRAVENOUS at 12:10

## 2021-08-25 RX ADMIN — SODIUM CHLORIDE, POTASSIUM CHLORIDE, SODIUM LACTATE AND CALCIUM CHLORIDE: 600; 310; 30; 20 INJECTION, SOLUTION INTRAVENOUS at 11:54

## 2021-08-25 RX ADMIN — LIDOCAINE HYDROCHLORIDE 100 MG: 20 INJECTION, SOLUTION INFILTRATION; PERINEURAL at 11:56

## 2021-08-25 RX ADMIN — GLYCOPYRROLATE 0.2 MG: 0.2 INJECTION, SOLUTION INTRAMUSCULAR; INTRAVENOUS at 12:11

## 2021-08-25 RX ADMIN — PROPOFOL 50 MG: 10 INJECTION, EMULSION INTRAVENOUS at 12:23

## 2021-08-25 RX ADMIN — PROPOFOL 100 MG: 10 INJECTION, EMULSION INTRAVENOUS at 11:56

## 2021-08-25 RX ADMIN — PROPOFOL 50 MG: 10 INJECTION, EMULSION INTRAVENOUS at 12:03

## 2021-08-25 RX ADMIN — PROPOFOL 50 MG: 10 INJECTION, EMULSION INTRAVENOUS at 12:16

## 2021-08-25 NOTE — OP NOTE
ESOPHAGOGASTRODUODENOSCOPY PROCEDURE REPORT    Cecil Gomez  8/25/2021    GASTROENTEROLOGIST:  Melvina Martinez MD     PRE-PROCEDURE DIAGNOSIS:  Anemia, unspecified type [D64.9]  Personal history of colonic polyps [Z86.010]    POST-PROCEDURE DIAGNOSIS  1.  Gastritis  2.  Hiatal hernia    Procedure(s):  ESOPHAGOGASTRODUODENOSCOPY WITH BIOPSY  COLONOSCOPY FOR SCREENING    ANESTHESIA:  Propofol administered by anesthesia.  See anesthesia notes for ASA classification    STAFF:  Circulator: Marylou Esparza RN  Scrub Person: Dawit Mello Technician: Babs Gomez    FINDINGS:  1.  Normal-appearing esophagus.  Biopsies were taken.  2.  Small hiatal hernia.  3.  Erythema of the antrum and body of the stomach.  Biopsies were taken for H. pylori.  4.  Normal-appearing duodenal bulb to the 2nd and 3rd portion of the duodenum.  Biopsies were taken for celiac disease.    OPERATIVE PROCEDURE:  After proper informed consent was obtained, patient was transferred to the OR/endoscopy suite.  Patient was then placed in left lateral decubitus position. The Olympus 180 series video gastroscope was inserted orally under direct visualization.  Esophagus, stomach, and duodenum were inspected.  The endoscope was passed to the third portion of the duodenum.  Scope was retroflexed for visualization of the cardia and incisuraThe endoscope was then withdrawn. Patient tolerated the procedure well. There were no immediate complications.    ESTIMATED BLOOD LOSS:  None    COMPLICATIONS;  None    RECOMMENDATIONS/ PLAN:  1.  Follow-up biopsy results.  2.  Proceed with colonoscopy secondary to iron deficiency anemia.    Melvina Martinez MD     08/25/21 12:08 EDT

## 2021-08-25 NOTE — ANESTHESIA POSTPROCEDURE EVALUATION
Patient: Cecil Gomez    Procedure Summary     Date: 08/25/21 Room / Location: [facility] OR 07 /  [facility] OR    Anesthesia Start: 1154 Anesthesia Stop: 1238    Procedures:       ESOPHAGOGASTRODUODENOSCOPY WITH BIOPSY (N/A Esophagus)      COLONOSCOPY FOR SCREENING (N/A ) Diagnosis:       Anemia, unspecified type      Personal history of colonic polyps      (Anemia, unspecified type [D64.9])      (Personal history of colonic polyps [Z86.010])    Surgeons: Melvina Martinez MD Provider: Mu Andujar MD    Anesthesia Type: general ASA Status: 3          Anesthesia Type: general    Vitals  Vitals Value Taken Time   /72 08/25/21 1300   Temp 98 °F (36.7 °C) 08/25/21 1245   Pulse 61 08/25/21 1300   Resp 16 08/25/21 1300   SpO2 98 % 08/25/21 1300           Post Anesthesia Care and Evaluation    Patient location during evaluation: PACU  Patient participation: complete - patient participated  Level of consciousness: awake  Pain score: 2  Pain management: adequate  Airway patency: patent  Anesthetic complications: No anesthetic complications  PONV Status: controlled  Cardiovascular status: acceptable and blood pressure returned to baseline  Respiratory status: acceptable  Hydration status: acceptable

## 2021-08-25 NOTE — ANESTHESIA PREPROCEDURE EVALUATION
Anesthesia Evaluation     Patient summary reviewed and Nursing notes reviewed   no history of anesthetic complications:  NPO Solid Status: > 8 hours  NPO Liquid Status: > 8 hours           Airway   Mallampati: II  TM distance: >3 FB  Neck ROM: full  No difficulty expected  Dental    (+) edentulous    Pulmonary - negative pulmonary ROS and normal exam    breath sounds clear to auscultation  Cardiovascular - normal exam    Rhythm: regular  Rate: normal    (+) past MI  >12 months, CAD, CHF ,       Neuro/Psych- negative ROS  GI/Hepatic/Renal/Endo    (+)  GERD,  renal disease stones, diabetes mellitus,     Musculoskeletal     Abdominal  - normal exam   Substance History - negative use     OB/GYN negative ob/gyn ROS         Other   arthritis,                    Anesthesia Plan    ASA 3     general     intravenous induction     Anesthetic plan, all risks, benefits, and alternatives have been provided, discussed and informed consent has been obtained with: patient.  Use of blood products discussed with patient  Consented to blood products.   Plan discussed with CRNA.

## 2021-08-25 NOTE — OP NOTE
COLONOSCOPY PROCEDURE NOTE    Cecil FRANNY Jason  8/25/2021    GASTROENTEROLOGIST:  Melvina Martinez MD      PRE-PROCEDURE DIAGNOSIS:   Anemia, unspecified type [D64.9]  Personal history of colonic polyps [Z86.010]    POST-PROCEDURE DIAGNOSIS:  1.  Colon polyps  2.  Diverticulosis  3.  Internal hemorrhoids    PROCEDURE:  COLONOSCOPY with snare polypectomy    ANESTHESIA:  Propofol administered by anesthesia.  See anesthesia notes for ASA classification    STAFF  Circulator: Marylou Esparza RN  Scrub Person: Dawit Mello Technician: Babs Gomez    Findings:  1.  A 4 mm polyp was found in the ascending colon.  This was removed with cold biopsy forceps.  2.  An 8 mm polyp was found in the transverse colon.  This was removed with cold snare polypectomy.  3.  A 6 mm polyp was found in the descending colon.  This was removed with cold snare polypectomy.  4.  Diverticulosis involving the left colon.  5.  Small internal hemorrhoids.  6.  Normal appearing terminal ileum.    OPERATIVE PROCEDURE   After proper informed consent was obtained, the patient was taken the operating suite and placed in left lateral decubitus position.  An Olympus video colonoscope 180 series was inserted in the rectum and advanced to the terminal ileum under direct visualization.  Cecum and terminal ileum were identified by visualization of the appendiceal orifice and ileocecal valve.  The colonoscope was then slowly withdrawn from the cecum to the rectum and passed a second time from rectum to cecum.  The colonoscope was retroflexed in the cecum and rectum. Scope was then withdrawn. Patient tolerated the procedure well. There were no immediate complications. Cecal withdrawal time was 14 minutes.    ESTIMATED BLOOD LOSS  None     COMPLICATIONS  None    RECOMMENDATIONS:  1.  Follow-up pathology.  2.  Proceed with capsule endoscopy if anemia does not improve.  3.  Follow-up in GI clinic.    Melvina Martinez MD  08/25/21 12:40  EDT

## 2021-08-30 LAB — LAB AP CASE REPORT: NORMAL

## 2021-08-31 NOTE — PROGRESS NOTES
Biopsies of your esophagus were negative for reflux esophagitis.  Biopsies of the stomach were negative for H. pylori gastritis.  Biopsies of the small bowel were negative for celiac disease.  2 of the 3 polyps removed from your colon were precancerous.  You will need repeat colonoscopy in 5 years.  If the anemia persists, we will proceed with small bowel capsule endoscopy.

## 2021-09-30 ENCOUNTER — OFFICE VISIT (OUTPATIENT)
Dept: GASTROENTEROLOGY | Facility: CLINIC | Age: 71
End: 2021-09-30

## 2021-09-30 VITALS
WEIGHT: 150 LBS | SYSTOLIC BLOOD PRESSURE: 128 MMHG | HEIGHT: 68 IN | DIASTOLIC BLOOD PRESSURE: 65 MMHG | HEART RATE: 57 BPM | BODY MASS INDEX: 22.73 KG/M2

## 2021-09-30 DIAGNOSIS — D50.9 IRON DEFICIENCY ANEMIA, UNSPECIFIED IRON DEFICIENCY ANEMIA TYPE: Primary | ICD-10-CM

## 2021-09-30 LAB
BASOPHILS # BLD AUTO: 0.02 10*3/MM3 (ref 0–0.2)
BASOPHILS NFR BLD AUTO: 0.4 % (ref 0–1.5)
DEPRECATED RDW RBC AUTO: 44.3 FL (ref 37–54)
EOSINOPHIL # BLD AUTO: 0.04 10*3/MM3 (ref 0–0.4)
EOSINOPHIL NFR BLD AUTO: 0.9 % (ref 0.3–6.2)
ERYTHROCYTE [DISTWIDTH] IN BLOOD BY AUTOMATED COUNT: 13.9 % (ref 12.3–15.4)
HCT VFR BLD AUTO: 41.3 % (ref 37.5–51)
HGB BLD-MCNC: 12.5 G/DL (ref 13–17.7)
IMM GRANULOCYTES # BLD AUTO: 0.01 10*3/MM3 (ref 0–0.05)
IMM GRANULOCYTES NFR BLD AUTO: 0.2 % (ref 0–0.5)
IRON 24H UR-MRATE: 88 MCG/DL (ref 59–158)
IRON SATN MFR SERPL: 27 % (ref 20–50)
LYMPHOCYTES # BLD AUTO: 1.7 10*3/MM3 (ref 0.7–3.1)
LYMPHOCYTES NFR BLD AUTO: 37.4 % (ref 19.6–45.3)
MCH RBC QN AUTO: 26.2 PG (ref 26.6–33)
MCHC RBC AUTO-ENTMCNC: 30.3 G/DL (ref 31.5–35.7)
MCV RBC AUTO: 86.6 FL (ref 79–97)
MONOCYTES # BLD AUTO: 0.45 10*3/MM3 (ref 0.1–0.9)
MONOCYTES NFR BLD AUTO: 9.9 % (ref 5–12)
NEUTROPHILS NFR BLD AUTO: 2.32 10*3/MM3 (ref 1.7–7)
NEUTROPHILS NFR BLD AUTO: 51.2 % (ref 42.7–76)
NRBC BLD AUTO-RTO: 0 /100 WBC (ref 0–0.2)
PLATELET # BLD AUTO: 161 10*3/MM3 (ref 140–450)
PMV BLD AUTO: 11.2 FL (ref 6–12)
RBC # BLD AUTO: 4.77 10*6/MM3 (ref 4.14–5.8)
TIBC SERPL-MCNC: 323 MCG/DL (ref 298–536)
TRANSFERRIN SERPL-MCNC: 217 MG/DL (ref 200–360)
WBC # BLD AUTO: 4.54 10*3/MM3 (ref 3.4–10.8)

## 2021-09-30 PROCEDURE — 83540 ASSAY OF IRON: CPT | Performed by: INTERNAL MEDICINE

## 2021-09-30 PROCEDURE — 85025 COMPLETE CBC W/AUTO DIFF WBC: CPT | Performed by: INTERNAL MEDICINE

## 2021-09-30 PROCEDURE — 99213 OFFICE O/P EST LOW 20 MIN: CPT | Performed by: INTERNAL MEDICINE

## 2021-09-30 PROCEDURE — 84466 ASSAY OF TRANSFERRIN: CPT | Performed by: INTERNAL MEDICINE

## 2021-09-30 NOTE — PROGRESS NOTES
Subjective   Cecil Gomez is a 71 y.o. male who presents to the office today as a follow up appointment regarding Anemia      History of Present Illness:  The patient presents for follow-up of iron deficiency anemia.  He recently under went EGD colonoscopy.  No source of bleeding was found at the time of endoscopy.  The upper endoscopy revealed negative biopsies of the esophagus negative biopsies for H. pylori gastritis and negative biopsies of the small bowel for celiac disease.  2 of the 3 polyps removed from the colon were a serrated and a tubular adenoma.  Hewill need repeat colonoscopy in 5 years.  He was told that if the anemia persists, we would proceed with small bowel capsule endoscopy.  He denies bright red blood per rectum or melena.  He has had no hematemesis.  Overall, he feels well.  He has no complaints today.  He does not take over-the-counter NSAIDs.  He does take a daily low-dose aspirin.      Review of Systems:  Review of Systems   Constitutional: Positive for fatigue. Negative for chills and fever.   HENT: Negative for trouble swallowing.    Eyes: Negative.    Respiratory: Negative for cough, choking, chest tightness and shortness of breath.    Cardiovascular: Negative for chest pain.   Gastrointestinal: Positive for abdominal pain. Negative for abdominal distention, anal bleeding, blood in stool, constipation, diarrhea, nausea and vomiting.   Endocrine: Negative.    Genitourinary: Negative for difficulty urinating and hematuria.   Musculoskeletal: Positive for neck pain. Negative for back pain.   Skin: Negative.    Allergic/Immunologic: Negative for environmental allergies and food allergies.   Neurological: Positive for dizziness and headaches. Negative for light-headedness.   Hematological: Bruises/bleeds easily.   Psychiatric/Behavioral: Negative.        Past Medical History:  Past Medical History:   Diagnosis Date   • Anemia    • Arthritis    • Blood clot in abdominal vein    • BPH (benign  prostatic hypertrophy)    • CHF (congestive heart failure) (CMS/HCC)    • Colon polyp    • Diabetes (CMS/HCC)    • Disease of thyroid gland    • Elevated PSA    • GERD (gastroesophageal reflux disease)    • H/O blood clots    • History of transfusion    • MI (myocardial infarction) (CMS/HCC)        Past Surgical History:  Past Surgical History:   Procedure Laterality Date   • ABDOMINAL SURGERY     • ABDOMINAL WASHOUT     • CARDIAC CATHETERIZATION     • CHOLECYSTECTOMY     • CIRCUMCISION N/A 1/31/2020    Procedure: CIRCUMCISION;  Surgeon: Lalo Camejo MD;  Location: [facility];  Service: Urology   • COLONOSCOPY  2013    pre- cancerous colon polyp   • COLONOSCOPY N/A 8/25/2021    Procedure: COLONOSCOPY FOR SCREENING;  Surgeon: Melvina Martinez MD;  Location: [facility];  Service: Gastroenterology;  Laterality: N/A;   • ENDOSCOPY     • ENDOSCOPY N/A 8/25/2021    Procedure: ESOPHAGOGASTRODUODENOSCOPY WITH BIOPSY;  Surgeon: Melvina Martinez MD;  Location: [facility];  Service: Gastroenterology;  Laterality: N/A;   • EXTRACORPOREAL SHOCK WAVE LITHOTRIPSY (ESWL) Left 1/31/2020    Procedure: EXTRACORPOREAL SHOCKWAVE LITHOTRIPSY;  Surgeon: Lalo Camejo MD;  Location: [facility];  Service: Urology   • EYE SURGERY Bilateral     iol   • HERNIA REPAIR     • KIDNEY STONE SURGERY         Family History:  Family History   Problem Relation Age of Onset   • Heart disease Father    • Heart failure Mother    • Stroke Brother        Social History:  Social History     Socioeconomic History   • Marital status:      Spouse name: Not on file   • Number of children: Not on file   • Years of education: Not on file   • Highest education level: Not on file   Tobacco Use   • Smoking status: Former Smoker   • Smokeless tobacco: Never Used   Vaping Use   • Vaping Use: Never used   Substance and Sexual Activity   • Alcohol use: No   • Drug use: No   • Sexual activity: Defer       Current Medication  "List:    Current Outpatient Medications:   •  aspirin 81 MG EC tablet, Take 81 mg by mouth Daily., Disp: , Rfl:   •  fluticasone (FLONASE) 50 MCG/ACT nasal spray, 2 sprays into the nostril(s) as directed by provider Daily., Disp: , Rfl:   •  Loratadine 10 MG capsule, Take  by mouth., Disp: , Rfl:   •  omeprazole (priLOSEC) 40 MG capsule, TAKE 1 CAPSULE ONCE DAILY, Disp: 30 capsule, Rfl: 5    Allergies:   Reglan [metoclopramide]    Vitals:  /65 (BP Location: Left arm, Patient Position: Sitting, Cuff Size: Adult)   Pulse 57   Ht 172.7 cm (68\")   Wt 68 kg (150 lb)   BMI 22.81 kg/m²     Physical Exam:  Physical Exam  Constitutional:       Appearance: He is normal weight.   HENT:      Head: Normocephalic and atraumatic.      Nose: Nose normal. No congestion or rhinorrhea.   Eyes:      General: No scleral icterus.     Extraocular Movements: Extraocular movements intact.      Conjunctiva/sclera: Conjunctivae normal.      Pupils: Pupils are equal, round, and reactive to light.   Cardiovascular:      Rate and Rhythm: Normal rate and regular rhythm.      Pulses: Normal pulses.      Heart sounds: Normal heart sounds.   Pulmonary:      Effort: Pulmonary effort is normal.      Breath sounds: Normal breath sounds.   Abdominal:      General: Abdomen is flat. Bowel sounds are normal. There is no distension.      Palpations: Abdomen is soft. There is no shifting dullness, fluid wave, hepatomegaly, splenomegaly, mass or pulsatile mass.      Tenderness: There is no abdominal tenderness. There is no guarding or rebound.      Hernia: No hernia is present.   Musculoskeletal:         General: No swelling or tenderness.      Cervical back: Normal range of motion and neck supple.   Skin:     General: Skin is warm and dry.      Coloration: Skin is not jaundiced.   Neurological:      General: No focal deficit present.      Mental Status: He is alert and oriented to person, place, and time.   Psychiatric:         Mood and Affect: " Mood normal.         Behavior: Behavior normal.         Results Review:  Lab Results:   No visits with results within 1 Month(s) from this visit.   Latest known visit with results is:   Admission on 08/25/2021, Discharged on 08/25/2021   Component Date Value Ref Range Status   • Case Report 08/25/2021    Final                    Value:Surgical Pathology Report                         Case: [id number]                                  Authorizing Provider:  Melvina Martinez, Collected:           08/25/2021 12:06 PM                                 MD                                                                           Ordering Location:     [facility]      Received:            08/25/2021 01:20 PM                                 OPERATING ROOM DEPARTMENT                                                    Pathologist:           Nat Linton MD                                                       Specimens:   1) - Small Intestine, Duodenum                                                                      2) - Gastric, Antrum                                                                                3) - Esophagus, Distal                                                                              4) - Large Intestine, Right / Ascending Colon, bx of ascending colon polyp                                                    5) - Large Intestine, Transverse Colon, transverse colon polyp                                      6) - Large Intestine, Left / Descending Colon, descending colon polyp                         Assessment/Plan     Visit Diagnoses:    ICD-10-CM ICD-9-CM   1. Iron deficiency anemia, unspecified iron deficiency anemia type  D50.9 280.9     Overall the patient is doing well.  He has no complaints.  He does not feel fatigued.  He has not had bright red blood per rectum, melena or hematemesis.  I will obtain CBC and iron profile today to check for continued iron deficiency  anemia.  If he does have continued anemia, I will proceed with a capsule endoscopy study.  I have encouraged him to avoid NSAIDs.    Plan:  Orders Placed This Encounter   Procedures   • Iron Profile   • CBC Auto Differential   • CBC & Differential             MEDS ORDERED DURING VISIT:  No orders of the defined types were placed in this encounter.      No follow-ups on file.             This document has been electronically signed by Melvina Martinez MD   September 30, 2021 14:06 EDT      Part of this note may be an electronic transcription/translation of spoken language to printed text using the Dragon Dictation System.

## 2021-10-11 ENCOUNTER — TELEPHONE (OUTPATIENT)
Dept: GASTROENTEROLOGY | Facility: CLINIC | Age: 71
End: 2021-10-11

## 2021-12-08 ENCOUNTER — OFFICE VISIT (OUTPATIENT)
Dept: GASTROENTEROLOGY | Facility: CLINIC | Age: 71
End: 2021-12-08

## 2021-12-08 VITALS
BODY MASS INDEX: 25.67 KG/M2 | WEIGHT: 169.4 LBS | HEART RATE: 61 BPM | SYSTOLIC BLOOD PRESSURE: 151 MMHG | HEIGHT: 68 IN | DIASTOLIC BLOOD PRESSURE: 74 MMHG

## 2021-12-08 DIAGNOSIS — D50.9 IRON DEFICIENCY ANEMIA, UNSPECIFIED IRON DEFICIENCY ANEMIA TYPE: Primary | ICD-10-CM

## 2021-12-08 DIAGNOSIS — K59.04 CHRONIC IDIOPATHIC CONSTIPATION: ICD-10-CM

## 2021-12-08 PROCEDURE — 84466 ASSAY OF TRANSFERRIN: CPT | Performed by: INTERNAL MEDICINE

## 2021-12-08 PROCEDURE — 85025 COMPLETE CBC W/AUTO DIFF WBC: CPT | Performed by: INTERNAL MEDICINE

## 2021-12-08 PROCEDURE — 83540 ASSAY OF IRON: CPT | Performed by: INTERNAL MEDICINE

## 2021-12-08 PROCEDURE — 99214 OFFICE O/P EST MOD 30 MIN: CPT | Performed by: INTERNAL MEDICINE

## 2021-12-08 NOTE — PROGRESS NOTES
Subjective   Cecil Gomez is a 71 y.o. male who presents to the office today as a follow up appointment regarding Anemia      History of Present Illness:  The patient presents for follow-up of iron deficiency anemia.  He recently under went EGD/colonoscopy.  No source of bleeding was found at the time of endoscopy.  His small bowel biopsies were negative for celiac disease which can cause iron deficiency anemia.  He did have several polyps removed.  He will need repeat colonoscopy in 5 years.  We will proceed with small bowel capsule endoscopy if the anemia persists.  He denies bright red blood per rectum, melena or hematemesis.  Overall, he feels well.  He has no complaints today.  He does not take over-the-counter NSAIDs.  He does take a daily low-dose aspirin which he stopped to to see if his anemia will improve.  He states he has a bowel movement once a week.  He took a stool softener for years but it did not work well.  He has struggled with constipation for several years now.  He has tried MiraLAX in the past but does not take it anymore.  He did not feel it worked well. A CBC performed on 9/30/2021 revealed a hemoglobin of 12.5 and hematocrit of 41.3.            Review of Systems:  Review of Systems   Constitutional: Positive for fatigue. Negative for chills and fever.   HENT: Negative for trouble swallowing.    Eyes: Negative.    Respiratory: Negative for cough, choking, chest tightness and shortness of breath.    Cardiovascular: Negative for chest pain.   Gastrointestinal: Negative for abdominal distention, anal bleeding, blood in stool, constipation, diarrhea, nausea and vomiting.   Endocrine: Negative.    Genitourinary: Negative for difficulty urinating and hematuria.   Musculoskeletal: Positive for neck pain. Negative for back pain.   Skin: Negative.    Allergic/Immunologic: Negative for environmental allergies and food allergies.   Neurological: Positive for dizziness and headaches. Negative for  light-headedness.   Hematological: Bruises/bleeds easily.   Psychiatric/Behavioral: Negative.        Past Medical History:  Past Medical History:   Diagnosis Date   • Anemia    • Arthritis    • Blood clot in abdominal vein    • BPH (benign prostatic hypertrophy)    • CHF (congestive heart failure) (HCC)    • Colon polyp    • Diabetes (HCC)    • Disease of thyroid gland    • Elevated PSA    • GERD (gastroesophageal reflux disease)    • H/O blood clots    • History of transfusion    • MI (myocardial infarction) (HCC)        Past Surgical History:  Past Surgical History:   Procedure Laterality Date   • ABDOMINAL SURGERY     • ABDOMINAL WASHOUT     • CARDIAC CATHETERIZATION     • CHOLECYSTECTOMY     • CIRCUMCISION N/A 1/31/2020    Procedure: CIRCUMCISION;  Surgeon: Lalo Camejo MD;  Location: [facility];  Service: Urology   • COLONOSCOPY  2013    pre- cancerous colon polyp   • COLONOSCOPY N/A 8/25/2021    Procedure: COLONOSCOPY FOR SCREENING;  Surgeon: Melvina Martinez MD;  Location: [facility];  Service: Gastroenterology;  Laterality: N/A;   • ENDOSCOPY     • ENDOSCOPY N/A 8/25/2021    Procedure: ESOPHAGOGASTRODUODENOSCOPY WITH BIOPSY;  Surgeon: Melvina Martinez MD;  Location: [facility];  Service: Gastroenterology;  Laterality: N/A;   • EXTRACORPOREAL SHOCK WAVE LITHOTRIPSY (ESWL) Left 1/31/2020    Procedure: EXTRACORPOREAL SHOCKWAVE LITHOTRIPSY;  Surgeon: Lalo Camejo MD;  Location: [facility];  Service: Urology   • EYE SURGERY Bilateral     iol   • HERNIA REPAIR     • KIDNEY STONE SURGERY         Family History:  Family History   Problem Relation Age of Onset   • Heart disease Father    • Heart failure Mother    • Stroke Brother        Social History:  Social History     Socioeconomic History   • Marital status:    Tobacco Use   • Smoking status: Former Smoker   • Smokeless tobacco: Never Used   Vaping Use   • Vaping Use: Never used   Substance and Sexual Activity   •  "Alcohol use: No   • Drug use: No   • Sexual activity: Defer       Current Medication List:    Current Outpatient Medications:   •  aspirin 81 MG EC tablet, Take 81 mg by mouth Daily., Disp: , Rfl:   •  fluticasone (FLONASE) 50 MCG/ACT nasal spray, 2 sprays into the nostril(s) as directed by provider Daily., Disp: , Rfl:   •  Loratadine 10 MG capsule, Take  by mouth., Disp: , Rfl:   •  omeprazole (priLOSEC) 40 MG capsule, TAKE 1 CAPSULE ONCE DAILY, Disp: 30 capsule, Rfl: 5  •  linaclotide (Linzess) 145 MCG capsule capsule, Take 1 capsule by mouth Daily. 30 minutes before meals on an empty stomach., Disp: 30 capsule, Rfl: 5    Allergies:   Reglan [metoclopramide]    Vitals:  /74 (BP Location: Left arm, Patient Position: Sitting, Cuff Size: Adult)   Pulse 61   Ht 172.7 cm (68\")   Wt 76.8 kg (169 lb 6.4 oz)   BMI 25.76 kg/m²     Physical Exam:  Physical Exam  Constitutional:       Appearance: He is normal weight.   HENT:      Head: Normocephalic and atraumatic.      Nose: Nose normal. No congestion or rhinorrhea.   Eyes:      General: No scleral icterus.     Extraocular Movements: Extraocular movements intact.      Conjunctiva/sclera: Conjunctivae normal.      Pupils: Pupils are equal, round, and reactive to light.   Cardiovascular:      Rate and Rhythm: Normal rate and regular rhythm.      Pulses: Normal pulses.      Heart sounds: Normal heart sounds.   Pulmonary:      Effort: Pulmonary effort is normal.      Breath sounds: Normal breath sounds.   Abdominal:      General: Abdomen is flat. Bowel sounds are normal. There is no distension.      Palpations: Abdomen is soft. There is no shifting dullness, fluid wave, hepatomegaly, splenomegaly, mass or pulsatile mass.      Tenderness: There is abdominal tenderness (Mild tenderness across the lower abdomen). There is no guarding or rebound.      Hernia: No hernia is present.   Musculoskeletal:         General: No swelling or tenderness.      Cervical back: Normal " range of motion and neck supple.   Skin:     General: Skin is warm and dry.      Coloration: Skin is not jaundiced.   Neurological:      General: No focal deficit present.      Mental Status: He is alert and oriented to person, place, and time.   Psychiatric:         Mood and Affect: Mood normal.         Behavior: Behavior normal.         Results Review:  Lab Results:   No visits with results within 1 Month(s) from this visit.   Latest known visit with results is:   Office Visit on 09/30/2021   Component Date Value Ref Range Status   • Iron 09/30/2021 88  59 - 158 mcg/dL Final   • Iron Saturation 09/30/2021 27  20 - 50 % Final   • Transferrin 09/30/2021 217  200 - 360 mg/dL Final   • TIBC 09/30/2021 323  298 - 536 mcg/dL Final   • WBC 09/30/2021 4.54  3.40 - 10.80 10*3/mm3 Final   • RBC 09/30/2021 4.77  4.14 - 5.80 10*6/mm3 Final   • Hemoglobin 09/30/2021 12.5* 13.0 - 17.7 g/dL Final   • Hematocrit 09/30/2021 41.3  37.5 - 51.0 % Final   • MCV 09/30/2021 86.6  79.0 - 97.0 fL Final   • MCH 09/30/2021 26.2* 26.6 - 33.0 pg Final   • MCHC 09/30/2021 30.3* 31.5 - 35.7 g/dL Final   • RDW 09/30/2021 13.9  12.3 - 15.4 % Final   • RDW-SD 09/30/2021 44.3  37.0 - 54.0 fl Final   • MPV 09/30/2021 11.2  6.0 - 12.0 fL Final   • Platelets 09/30/2021 161  140 - 450 10*3/mm3 Final   • Neutrophil % 09/30/2021 51.2  42.7 - 76.0 % Final   • Lymphocyte % 09/30/2021 37.4  19.6 - 45.3 % Final   • Monocyte % 09/30/2021 9.9  5.0 - 12.0 % Final   • Eosinophil % 09/30/2021 0.9  0.3 - 6.2 % Final   • Basophil % 09/30/2021 0.4  0.0 - 1.5 % Final   • Immature Grans % 09/30/2021 0.2  0.0 - 0.5 % Final   • Neutrophils, Absolute 09/30/2021 2.32  1.70 - 7.00 10*3/mm3 Final   • Lymphocytes, Absolute 09/30/2021 1.70  0.70 - 3.10 10*3/mm3 Final   • Monocytes, Absolute 09/30/2021 0.45  0.10 - 0.90 10*3/mm3 Final   • Eosinophils, Absolute 09/30/2021 0.04  0.00 - 0.40 10*3/mm3 Final   • Basophils, Absolute 09/30/2021 0.02  0.00 - 0.20 10*3/mm3 Final   •  Immature Grans, Absolute 09/30/2021 0.01  0.00 - 0.05 10*3/mm3 Final   • nRBC 09/30/2021 0.0  0.0 - 0.2 /100 WBC Final       Assessment/Plan     Visit Diagnoses:    ICD-10-CM ICD-9-CM   1. Iron deficiency anemia, unspecified iron deficiency anemia type  D50.9 280.9   2. Chronic idiopathic constipation  K59.04 564.00       Plan:  Orders Placed This Encounter   Procedures   • Iron Profile   • CBC Auto Differential   • CBC & Differential       I will check today to see if the patient's iron deficiency anemia has resolved.  He will have a CBC, iron and TIBC today.  I have started him on Linzess 145 mcg daily for constipation.  He will follow-up in 8 weeks.      MEDS ORDERED DURING VISIT:  New Medications Ordered This Visit   Medications   • linaclotide (Linzess) 145 MCG capsule capsule     Sig: Take 1 capsule by mouth Daily. 30 minutes before meals on an empty stomach.     Dispense:  30 capsule     Refill:  5       No follow-ups on file.             This document has been electronically signed by Melvina Martinez MD   December 8, 2021 16:32 EST

## 2021-12-09 LAB
BASOPHILS # BLD AUTO: 0.02 10*3/MM3 (ref 0–0.2)
BASOPHILS NFR BLD AUTO: 0.4 % (ref 0–1.5)
DEPRECATED RDW RBC AUTO: 44.7 FL (ref 37–54)
EOSINOPHIL # BLD AUTO: 0.04 10*3/MM3 (ref 0–0.4)
EOSINOPHIL NFR BLD AUTO: 0.7 % (ref 0.3–6.2)
ERYTHROCYTE [DISTWIDTH] IN BLOOD BY AUTOMATED COUNT: 14.4 % (ref 12.3–15.4)
HCT VFR BLD AUTO: 41.5 % (ref 37.5–51)
HGB BLD-MCNC: 13 G/DL (ref 13–17.7)
IMM GRANULOCYTES # BLD AUTO: 0.01 10*3/MM3 (ref 0–0.05)
IMM GRANULOCYTES NFR BLD AUTO: 0.2 % (ref 0–0.5)
IRON 24H UR-MRATE: 75 MCG/DL (ref 59–158)
IRON SATN MFR SERPL: 23 % (ref 20–50)
LYMPHOCYTES # BLD AUTO: 1.8 10*3/MM3 (ref 0.7–3.1)
LYMPHOCYTES NFR BLD AUTO: 33.5 % (ref 19.6–45.3)
MCH RBC QN AUTO: 26.7 PG (ref 26.6–33)
MCHC RBC AUTO-ENTMCNC: 31.3 G/DL (ref 31.5–35.7)
MCV RBC AUTO: 85.2 FL (ref 79–97)
MONOCYTES # BLD AUTO: 0.5 10*3/MM3 (ref 0.1–0.9)
MONOCYTES NFR BLD AUTO: 9.3 % (ref 5–12)
NEUTROPHILS NFR BLD AUTO: 3.01 10*3/MM3 (ref 1.7–7)
NEUTROPHILS NFR BLD AUTO: 55.9 % (ref 42.7–76)
NRBC BLD AUTO-RTO: 0 /100 WBC (ref 0–0.2)
PLATELET # BLD AUTO: 178 10*3/MM3 (ref 140–450)
PMV BLD AUTO: 11.4 FL (ref 6–12)
RBC # BLD AUTO: 4.87 10*6/MM3 (ref 4.14–5.8)
TIBC SERPL-MCNC: 331 MCG/DL (ref 298–536)
TRANSFERRIN SERPL-MCNC: 222 MG/DL (ref 200–360)
WBC NRBC COR # BLD: 5.38 10*3/MM3 (ref 3.4–10.8)

## 2021-12-09 NOTE — PROGRESS NOTES
Your recent labs show that your iron deficiency anemia has resolved off of the aspirin.  Please avoid NSAIDs, such as, ibuprofen, Advil, Motrin and Aleve.

## 2022-02-10 ENCOUNTER — OFFICE VISIT (OUTPATIENT)
Dept: GASTROENTEROLOGY | Facility: CLINIC | Age: 72
End: 2022-02-10

## 2022-02-10 VITALS
BODY MASS INDEX: 25.34 KG/M2 | SYSTOLIC BLOOD PRESSURE: 149 MMHG | WEIGHT: 167.2 LBS | DIASTOLIC BLOOD PRESSURE: 74 MMHG | HEIGHT: 68 IN | HEART RATE: 64 BPM

## 2022-02-10 DIAGNOSIS — D50.9 IRON DEFICIENCY ANEMIA, UNSPECIFIED IRON DEFICIENCY ANEMIA TYPE: Primary | ICD-10-CM

## 2022-02-10 DIAGNOSIS — K43.9 ABDOMINAL WALL HERNIA: ICD-10-CM

## 2022-02-10 PROCEDURE — 85025 COMPLETE CBC W/AUTO DIFF WBC: CPT | Performed by: INTERNAL MEDICINE

## 2022-02-10 PROCEDURE — 99213 OFFICE O/P EST LOW 20 MIN: CPT | Performed by: INTERNAL MEDICINE

## 2022-02-10 NOTE — PROGRESS NOTES
Subjective   Cecil Gomez is a 72 y.o. male who presents to the office today as a follow up appointment regarding Anemia      History of Present Illness:  The patient presents for follow-up iron deficiency anemia.  He recently underwent an EGD and colonoscopy there was no source for the anemia found at the time of these 2 procedures.  It appeared that at 1 point the anemia had resolved.  However, he tells me today that he is anemic again.  He is not on iron supplement.  He denies NSAID use.  He does take a baby aspirin daily.  He complains of abdominal soreness along his surgical scars.  He has a long midline scar.  He has had multiple hernia repairs along the scar. This was performed in 2016 at [facility].      Review of Systems:  Review of Systems   Constitutional: Positive for fatigue. Negative for chills and fever.   HENT: Negative for trouble swallowing.    Eyes: Negative.    Respiratory: Negative for cough, choking, chest tightness and shortness of breath.    Cardiovascular: Negative for chest pain.   Gastrointestinal: Positive for abdominal pain and constipation. Negative for abdominal distention, anal bleeding, blood in stool, diarrhea, nausea and vomiting.   Endocrine: Negative.    Genitourinary: Negative for difficulty urinating and hematuria.   Musculoskeletal: Positive for neck pain. Negative for back pain.   Skin: Negative.    Allergic/Immunologic: Negative for environmental allergies and food allergies.   Neurological: Positive for dizziness and headaches. Negative for light-headedness.   Hematological: Bruises/bleeds easily.   Psychiatric/Behavioral: Negative.        Past Medical History:  Past Medical History:   Diagnosis Date   • Anemia    • Arthritis    • Blood clot in abdominal vein    • BPH (benign prostatic hypertrophy)    • CHF (congestive heart failure) (HCC)    • Colon polyp    • Diabetes (HCC)    • Disease of thyroid gland    • Elevated PSA    • GERD (gastroesophageal reflux disease)     • H/O blood clots    • History of transfusion    • MI (myocardial infarction) (HCC)        Past Surgical History:  Past Surgical History:   Procedure Laterality Date   • ABDOMINAL SURGERY     • ABDOMINAL WASHOUT     • CARDIAC CATHETERIZATION     • CHOLECYSTECTOMY     • CIRCUMCISION N/A 1/31/2020    Procedure: CIRCUMCISION;  Surgeon: Lalo Camejo MD;  Location: [facility];  Service: Urology   • COLONOSCOPY  2013    pre- cancerous colon polyp   • COLONOSCOPY N/A 8/25/2021    Procedure: COLONOSCOPY FOR SCREENING;  Surgeon: Melvina Martinez MD;  Location: [facility];  Service: Gastroenterology;  Laterality: N/A;   • ENDOSCOPY     • ENDOSCOPY N/A 8/25/2021    Procedure: ESOPHAGOGASTRODUODENOSCOPY WITH BIOPSY;  Surgeon: Melvina Martinez MD;  Location: [facility];  Service: Gastroenterology;  Laterality: N/A;   • EXTRACORPOREAL SHOCK WAVE LITHOTRIPSY (ESWL) Left 1/31/2020    Procedure: EXTRACORPOREAL SHOCKWAVE LITHOTRIPSY;  Surgeon: Lalo Camejo MD;  Location: [facility];  Service: Urology   • EYE SURGERY Bilateral     iol   • HERNIA REPAIR     • KIDNEY STONE SURGERY         Family History:  Family History   Problem Relation Age of Onset   • Heart disease Father    • Heart failure Mother    • Stroke Brother        Social History:  Social History     Socioeconomic History   • Marital status:    Tobacco Use   • Smoking status: Former Smoker   • Smokeless tobacco: Never Used   Vaping Use   • Vaping Use: Never used   Substance and Sexual Activity   • Alcohol use: No   • Drug use: No   • Sexual activity: Defer       Current Medication List:    Current Outpatient Medications:   •  aspirin 81 MG EC tablet, Take 81 mg by mouth Daily., Disp: , Rfl:   •  fluticasone (FLONASE) 50 MCG/ACT nasal spray, 2 sprays into the nostril(s) as directed by provider Daily., Disp: , Rfl:   •  linaclotide (Linzess) 145 MCG capsule capsule, Take 1 capsule by mouth Daily. 30 minutes before meals on an  "empty stomach., Disp: 30 capsule, Rfl: 5  •  Loratadine 10 MG capsule, Take  by mouth., Disp: , Rfl:   •  omeprazole (priLOSEC) 40 MG capsule, TAKE 1 CAPSULE ONCE DAILY, Disp: 30 capsule, Rfl: 5    Allergies:   Reglan [metoclopramide]    Vitals:  /74 (BP Location: Left arm, Patient Position: Sitting, Cuff Size: Adult)   Pulse 64   Ht 172.7 cm (68\")   Wt 75.8 kg (167 lb 3.2 oz)   BMI 25.42 kg/m²     Physical Exam:  Physical Exam  Constitutional:       Appearance: He is normal weight.   HENT:      Head: Normocephalic and atraumatic.      Nose: Nose normal. No congestion or rhinorrhea.   Eyes:      General: No scleral icterus.     Extraocular Movements: Extraocular movements intact.      Conjunctiva/sclera: Conjunctivae normal.      Pupils: Pupils are equal, round, and reactive to light.   Cardiovascular:      Rate and Rhythm: Normal rate and regular rhythm.      Pulses: Normal pulses.      Heart sounds: Normal heart sounds.   Pulmonary:      Effort: Pulmonary effort is normal.      Breath sounds: Normal breath sounds.   Abdominal:      General: Abdomen is flat. Bowel sounds are normal. There is no distension.      Palpations: Abdomen is soft. There is no shifting dullness, fluid wave, hepatomegaly, splenomegaly, mass or pulsatile mass.      Tenderness: There is no abdominal tenderness. There is no guarding or rebound.      Hernia: No hernia is present.   Musculoskeletal:         General: No swelling or tenderness.      Cervical back: Normal range of motion and neck supple.   Skin:     General: Skin is warm and dry.      Coloration: Skin is not jaundiced.   Neurological:      General: No focal deficit present.      Mental Status: He is alert and oriented to person, place, and time.   Psychiatric:         Mood and Affect: Mood normal.         Behavior: Behavior normal.         Results Review:  Lab Results:   Office Visit on 02/10/2022   Component Date Value Ref Range Status   • WBC 02/10/2022 4.68  3.40 - " 10.80 10*3/mm3 Final   • RBC 02/10/2022 4.66  4.14 - 5.80 10*6/mm3 Final   • Hemoglobin 02/10/2022 12.4* 13.0 - 17.7 g/dL Final   • Hematocrit 02/10/2022 39.1  37.5 - 51.0 % Final   • MCV 02/10/2022 83.9  79.0 - 97.0 fL Final   • MCH 02/10/2022 26.6  26.6 - 33.0 pg Final   • MCHC 02/10/2022 31.7  31.5 - 35.7 g/dL Final   • RDW 02/10/2022 14.1  12.3 - 15.4 % Final   • RDW-SD 02/10/2022 42.7  37.0 - 54.0 fl Final   • MPV 02/10/2022 11.0  6.0 - 12.0 fL Final   • Platelets 02/10/2022 177  140 - 450 10*3/mm3 Final   • Neutrophil % 02/10/2022 50.6  42.7 - 76.0 % Final   • Lymphocyte % 02/10/2022 38.5  19.6 - 45.3 % Final   • Monocyte % 02/10/2022 8.8  5.0 - 12.0 % Final   • Eosinophil % 02/10/2022 1.7  0.3 - 6.2 % Final   • Basophil % 02/10/2022 0.2  0.0 - 1.5 % Final   • Immature Grans % 02/10/2022 0.2  0.0 - 0.5 % Final   • Neutrophils, Absolute 02/10/2022 2.37  1.70 - 7.00 10*3/mm3 Final   • Lymphocytes, Absolute 02/10/2022 1.80  0.70 - 3.10 10*3/mm3 Final   • Monocytes, Absolute 02/10/2022 0.41  0.10 - 0.90 10*3/mm3 Final   • Eosinophils, Absolute 02/10/2022 0.08  0.00 - 0.40 10*3/mm3 Final   • Basophils, Absolute 02/10/2022 0.01  0.00 - 0.20 10*3/mm3 Final   • Immature Grans, Absolute 02/10/2022 0.01  0.00 - 0.05 10*3/mm3 Final   • nRBC 02/10/2022 0.0  0.0 - 0.2 /100 WBC Final       Assessment/Plan     Visit Diagnoses:    ICD-10-CM ICD-9-CM   1. Iron deficiency anemia, unspecified iron deficiency anemia type  D50.9 280.9   2. Abdominal wall hernia  K43.9 553.20       Plan:  Orders Placed This Encounter   Procedures   • FL Upper GI Single Contrast SBFT   • CT Abdomen With & Without Contrast   • CBC Auto Differential   • CBC & Differential       I am going to check a CT scan of the abdomen and pelvis with and without contrast to evaluate for abdominal wall hernia as well as to evaluate the patient's anemia.  If this is negative and the anemia persists, I will proceed with capsule endoscopy.  Today I will obtain a  CBC.      MEDS ORDERED DURING VISIT:  No orders of the defined types were placed in this encounter.      Return in about 8 weeks (around 4/7/2022).             This document has been electronically signed by Melvina Martinez MD   February 11, 2022 16:26 EST      Part of this note may be an electronic transcription/translation of spoken language to printed text using the Dragon Dictation System.

## 2022-02-11 LAB
BASOPHILS # BLD AUTO: 0.01 10*3/MM3 (ref 0–0.2)
BASOPHILS NFR BLD AUTO: 0.2 % (ref 0–1.5)
DEPRECATED RDW RBC AUTO: 42.7 FL (ref 37–54)
EOSINOPHIL # BLD AUTO: 0.08 10*3/MM3 (ref 0–0.4)
EOSINOPHIL NFR BLD AUTO: 1.7 % (ref 0.3–6.2)
ERYTHROCYTE [DISTWIDTH] IN BLOOD BY AUTOMATED COUNT: 14.1 % (ref 12.3–15.4)
HCT VFR BLD AUTO: 39.1 % (ref 37.5–51)
HGB BLD-MCNC: 12.4 G/DL (ref 13–17.7)
IMM GRANULOCYTES # BLD AUTO: 0.01 10*3/MM3 (ref 0–0.05)
IMM GRANULOCYTES NFR BLD AUTO: 0.2 % (ref 0–0.5)
LYMPHOCYTES # BLD AUTO: 1.8 10*3/MM3 (ref 0.7–3.1)
LYMPHOCYTES NFR BLD AUTO: 38.5 % (ref 19.6–45.3)
MCH RBC QN AUTO: 26.6 PG (ref 26.6–33)
MCHC RBC AUTO-ENTMCNC: 31.7 G/DL (ref 31.5–35.7)
MCV RBC AUTO: 83.9 FL (ref 79–97)
MONOCYTES # BLD AUTO: 0.41 10*3/MM3 (ref 0.1–0.9)
MONOCYTES NFR BLD AUTO: 8.8 % (ref 5–12)
NEUTROPHILS NFR BLD AUTO: 2.37 10*3/MM3 (ref 1.7–7)
NEUTROPHILS NFR BLD AUTO: 50.6 % (ref 42.7–76)
NRBC BLD AUTO-RTO: 0 /100 WBC (ref 0–0.2)
PLATELET # BLD AUTO: 177 10*3/MM3 (ref 140–450)
PMV BLD AUTO: 11 FL (ref 6–12)
RBC # BLD AUTO: 4.66 10*6/MM3 (ref 4.14–5.8)
WBC NRBC COR # BLD: 4.68 10*3/MM3 (ref 3.4–10.8)

## 2022-02-14 NOTE — PROGRESS NOTES
Your recent labs revealed a mildly low hemoglobin at 12.4.  4 months ago it was 12.5.  I am going to have you undergo CT scan to evaluate for the abdominal wall hernia as well as anemia.  Please keep your follow-up appointments.

## 2022-03-02 ENCOUNTER — HOSPITAL ENCOUNTER (OUTPATIENT)
Dept: GENERAL RADIOLOGY | Facility: HOSPITAL | Age: 72
Discharge: HOME | End: 2022-03-02
Admitting: INTERNAL MEDICINE

## 2022-03-02 DIAGNOSIS — D50.9 IRON DEFICIENCY ANEMIA, UNSPECIFIED IRON DEFICIENCY ANEMIA TYPE: ICD-10-CM

## 2022-03-02 PROCEDURE — 74240 X-RAY XM UPR GI TRC 1CNTRST: CPT

## 2022-03-02 PROCEDURE — 74248 X-RAY SM INT F-THRU STD: CPT

## 2022-03-02 PROCEDURE — 74240 X-RAY XM UPR GI TRC 1CNTRST: CPT | Performed by: RADIOLOGY

## 2022-03-02 PROCEDURE — 74248 X-RAY SM INT F-THRU STD: CPT | Performed by: RADIOLOGY

## 2022-03-02 NOTE — PROGRESS NOTES
Your recent upper GI series with small bowel follow-through revealed a moderate hiatal hernia with reflux.  The small bowel follow-through was normal.

## 2022-03-07 ENCOUNTER — HOSPITAL ENCOUNTER (OUTPATIENT)
Dept: CT IMAGING | Facility: HOSPITAL | Age: 72
Discharge: HOME | End: 2022-03-07
Admitting: INTERNAL MEDICINE

## 2022-03-07 DIAGNOSIS — K43.9 ABDOMINAL WALL HERNIA: ICD-10-CM

## 2022-03-07 PROCEDURE — 82565 ASSAY OF CREATININE: CPT

## 2022-03-07 PROCEDURE — 74170 CT ABD WO CNTRST FLWD CNTRST: CPT

## 2022-03-07 PROCEDURE — 25010000002 IOPAMIDOL 61 % SOLUTION: Performed by: INTERNAL MEDICINE

## 2022-03-07 PROCEDURE — 74170 CT ABD WO CNTRST FLWD CNTRST: CPT | Performed by: RADIOLOGY

## 2022-03-07 RX ADMIN — IOPAMIDOL 80 ML: 612 INJECTION, SOLUTION INTRAVENOUS at 12:03

## 2022-03-08 NOTE — PROGRESS NOTES
Your recent CT scan showed a small to moderate size hiatal hernia.  The liver did not show any masses.  The mid abdominal herniation is stable.  There are changes consistent with anterior abdominal wall hernia repair.  There is no recurrence of hernia at this area.  The CT scan of the abdomen pelvis did not show cause of your anemia.

## 2022-04-04 LAB — CREAT BLDA-MCNC: 0.8 MG/DL (ref 0.6–1.3)

## 2022-04-07 ENCOUNTER — OFFICE VISIT (OUTPATIENT)
Dept: GASTROENTEROLOGY | Facility: CLINIC | Age: 72
End: 2022-04-07

## 2022-04-07 VITALS
HEIGHT: 68 IN | SYSTOLIC BLOOD PRESSURE: 165 MMHG | WEIGHT: 167 LBS | DIASTOLIC BLOOD PRESSURE: 79 MMHG | HEART RATE: 53 BPM | BODY MASS INDEX: 25.31 KG/M2

## 2022-04-07 DIAGNOSIS — E03.9 ACQUIRED HYPOTHYROIDISM: ICD-10-CM

## 2022-04-07 DIAGNOSIS — D50.9 IRON DEFICIENCY ANEMIA, UNSPECIFIED IRON DEFICIENCY ANEMIA TYPE: Primary | ICD-10-CM

## 2022-04-07 DIAGNOSIS — Z91.89 HIGH RISK OF CARDIAC EVENT: ICD-10-CM

## 2022-04-07 LAB
INR PPP: 0.93 (ref 0.9–1.1)
PROTHROMBIN TIME: 12.6 SECONDS (ref 12.1–14.7)

## 2022-04-07 PROCEDURE — 85025 COMPLETE CBC W/AUTO DIFF WBC: CPT | Performed by: INTERNAL MEDICINE

## 2022-04-07 PROCEDURE — 84466 ASSAY OF TRANSFERRIN: CPT | Performed by: INTERNAL MEDICINE

## 2022-04-07 PROCEDURE — 99214 OFFICE O/P EST MOD 30 MIN: CPT | Performed by: INTERNAL MEDICINE

## 2022-04-07 PROCEDURE — 85610 PROTHROMBIN TIME: CPT | Performed by: INTERNAL MEDICINE

## 2022-04-07 PROCEDURE — 85045 AUTOMATED RETICULOCYTE COUNT: CPT | Performed by: INTERNAL MEDICINE

## 2022-04-07 PROCEDURE — 83010 ASSAY OF HAPTOGLOBIN QUANT: CPT | Performed by: INTERNAL MEDICINE

## 2022-04-07 PROCEDURE — 83540 ASSAY OF IRON: CPT | Performed by: INTERNAL MEDICINE

## 2022-04-07 PROCEDURE — 80053 COMPREHEN METABOLIC PANEL: CPT | Performed by: INTERNAL MEDICINE

## 2022-04-07 PROCEDURE — 84443 ASSAY THYROID STIM HORMONE: CPT | Performed by: INTERNAL MEDICINE

## 2022-04-07 NOTE — PROGRESS NOTES
Subjective   Cecil Gomez is a 72 y.o. male who presents to the office today as a follow up appointment regarding Anemia      History of Present Illness:  The patient presents for follow-up iron deficiency anemia.  He recently underwent an EGD and colonoscopy there was no source for the anemia found at the time of these 2 procedures.  It appeared that at 1 point the anemia had resolved.  However, he tells me today that he is anemic again.  He is not on iron supplement.  He denies NSAID use.  He does take a baby aspirin daily.  He complains of abdominal soreness along his surgical scars.  He has a long midline scar.  He has had multiple hernia repairs along the scar. This was performed in 2016 at [facility].  He was taking an ASA but this was stopped because of the anemia. His wife gave him 3 baby ASA.  He had a large bowel movement and went to bed.  He slept well and felt good when he woke up.   He had an episode of sharp pain in the left chest, left arm numbness and diaphoresis.       Review of Systems:  Review of Systems   Constitutional: Positive for fatigue. Negative for chills and fever.   HENT: Negative for trouble swallowing.    Eyes: Negative.    Respiratory: Negative for cough, choking, chest tightness and shortness of breath.    Cardiovascular: Negative for chest pain.   Gastrointestinal: Positive for abdominal pain. Negative for abdominal distention, anal bleeding, blood in stool, constipation, diarrhea, nausea and vomiting.   Endocrine: Negative.    Genitourinary: Negative for difficulty urinating and hematuria.   Musculoskeletal: Positive for neck pain. Negative for back pain.   Skin: Negative.    Allergic/Immunologic: Negative for environmental allergies and food allergies.   Neurological: Positive for dizziness and headaches. Negative for light-headedness.   Hematological: Bruises/bleeds easily.   Psychiatric/Behavioral: Negative.        Past Medical History:  Past Medical History:   Diagnosis  Date   • Anemia    • Arthritis    • Blood clot in abdominal vein    • BPH (benign prostatic hypertrophy)    • CHF (congestive heart failure) (HCC)    • Colon polyp    • Diabetes (HCC)    • Disease of thyroid gland    • Elevated PSA    • GERD (gastroesophageal reflux disease)    • H/O blood clots    • History of transfusion    • MI (myocardial infarction) (HCC)        Past Surgical History:  Past Surgical History:   Procedure Laterality Date   • ABDOMINAL SURGERY     • ABDOMINAL WASHOUT     • CARDIAC CATHETERIZATION     • CHOLECYSTECTOMY     • CIRCUMCISION N/A 1/31/2020    Procedure: CIRCUMCISION;  Surgeon: Lalo Camejo MD;  Location: [facility];  Service: Urology   • COLONOSCOPY  2013    pre- cancerous colon polyp   • COLONOSCOPY N/A 8/25/2021    Procedure: COLONOSCOPY FOR SCREENING;  Surgeon: Melvina Martinez MD;  Location: [facility];  Service: Gastroenterology;  Laterality: N/A;   • ENDOSCOPY     • ENDOSCOPY N/A 8/25/2021    Procedure: ESOPHAGOGASTRODUODENOSCOPY WITH BIOPSY;  Surgeon: Melvina Martinez MD;  Location: [facility];  Service: Gastroenterology;  Laterality: N/A;   • EXTRACORPOREAL SHOCK WAVE LITHOTRIPSY (ESWL) Left 1/31/2020    Procedure: EXTRACORPOREAL SHOCKWAVE LITHOTRIPSY;  Surgeon: Lalo Camejo MD;  Location: [facility];  Service: Urology   • EYE SURGERY Bilateral     iol   • HERNIA REPAIR     • KIDNEY STONE SURGERY         Family History:  Family History   Problem Relation Age of Onset   • Heart disease Father    • Heart failure Mother    • Stroke Brother        Social History:  Social History     Socioeconomic History   • Marital status:    Tobacco Use   • Smoking status: Former Smoker   • Smokeless tobacco: Never Used   Vaping Use   • Vaping Use: Never used   Substance and Sexual Activity   • Alcohol use: No   • Drug use: No   • Sexual activity: Defer       Current Medication List:    Current Outpatient Medications:   •  aspirin 81 MG EC tablet, Take  "81 mg by mouth Daily., Disp: , Rfl:   •  fluticasone (FLONASE) 50 MCG/ACT nasal spray, 2 sprays into the nostril(s) as directed by provider Daily., Disp: , Rfl:   •  linaclotide (Linzess) 145 MCG capsule capsule, Take 1 capsule by mouth Daily. 30 minutes before meals on an empty stomach., Disp: 30 capsule, Rfl: 5  •  Loratadine 10 MG capsule, Take  by mouth., Disp: , Rfl:   •  omeprazole (priLOSEC) 40 MG capsule, TAKE 1 CAPSULE ONCE DAILY, Disp: 30 capsule, Rfl: 5    Allergies:   Reglan [metoclopramide]    Vitals:  /79 (BP Location: Left arm, Patient Position: Sitting, Cuff Size: Adult)   Pulse 53   Ht 172.7 cm (68\")   Wt 75.8 kg (167 lb)   BMI 25.39 kg/m²     Physical Exam:  Physical Exam  Constitutional:       Appearance: He is normal weight.   HENT:      Head: Normocephalic and atraumatic.      Nose: Nose normal. No congestion or rhinorrhea.   Eyes:      General: No scleral icterus.     Extraocular Movements: Extraocular movements intact.      Conjunctiva/sclera: Conjunctivae normal.      Pupils: Pupils are equal, round, and reactive to light.   Cardiovascular:      Rate and Rhythm: Normal rate and regular rhythm.      Pulses: Normal pulses.      Heart sounds: Normal heart sounds.   Pulmonary:      Effort: Pulmonary effort is normal.      Breath sounds: Normal breath sounds.   Abdominal:      General: Abdomen is flat. Bowel sounds are normal. There is no distension.      Palpations: Abdomen is soft. There is no shifting dullness, fluid wave, hepatomegaly, splenomegaly, mass or pulsatile mass.      Tenderness: There is no abdominal tenderness. There is no guarding or rebound.      Hernia: No hernia is present.   Musculoskeletal:         General: No swelling or tenderness.      Cervical back: Normal range of motion and neck supple.   Skin:     General: Skin is warm and dry.      Coloration: Skin is not jaundiced.   Neurological:      General: No focal deficit present.      Mental Status: He is alert and " oriented to person, place, and time.   Psychiatric:         Mood and Affect: Mood normal.         Behavior: Behavior normal.         Results Review:  Lab Results:   No visits with results within 1 Month(s) from this visit.   Latest known visit with results is:   Hospital Outpatient Visit on 03/07/2022   Component Date Value Ref Range Status   • Creatinine 03/07/2022 0.80  0.60 - 1.30 mg/dL Final    Serial Number: [id number]:  [id number]       Assessment/Plan     Visit Diagnoses:    ICD-10-CM ICD-9-CM   1. Iron deficiency anemia, unspecified iron deficiency anemia type  D50.9 280.9   2. Acquired hypothyroidism  E03.9 244.9       Plan:  Orders Placed This Encounter   Procedures   • Iron Profile   • Reticulocytes   • Haptoglobin   • Comprehensive Metabolic Panel   • TSH   • Protime-INR   • Endoscopy, GI With Capsule   • CBC & Differential       I will have the patient go ahead and proceed with a capsule endoscopy.  He has had a negative EGD, colonoscopy and CT scan in respect to discerning the cause of his anemia.  I will repeat his labs today.  I will also check a reticulocyte count and haptoglobin.  He is hypothyroid and states that he has not had his thyroid checked in a while.  I will also recheck a CBC and iron profile.  He will follow-up in 8 weeks.      MEDS ORDERED DURING VISIT:  No orders of the defined types were placed in this encounter.      Return in about 8 weeks (around 6/2/2022).             This document has been electronically signed by Melvina Martinez MD   April 7, 2022 14:16 EDT      Part of this note may be an electronic transcription/translation of spoken language to printed text using the Dragon Dictation System.

## 2022-04-08 DIAGNOSIS — K43.9 ABDOMINAL WALL HERNIA: ICD-10-CM

## 2022-04-08 DIAGNOSIS — D50.9 IRON DEFICIENCY ANEMIA, UNSPECIFIED IRON DEFICIENCY ANEMIA TYPE: ICD-10-CM

## 2022-04-08 DIAGNOSIS — E03.9 ACQUIRED HYPOTHYROIDISM: ICD-10-CM

## 2022-04-08 DIAGNOSIS — Z91.89 HIGH RISK OF CARDIAC EVENT: ICD-10-CM

## 2022-04-08 DIAGNOSIS — K59.04 CHRONIC IDIOPATHIC CONSTIPATION: Primary | ICD-10-CM

## 2022-04-08 LAB
ALBUMIN SERPL-MCNC: 4.1 G/DL (ref 3.5–5.2)
ALBUMIN/GLOB SERPL: 1.1 G/DL
ALP SERPL-CCNC: 90 U/L (ref 39–117)
ALT SERPL W P-5'-P-CCNC: 26 U/L (ref 1–41)
ANION GAP SERPL CALCULATED.3IONS-SCNC: 9.4 MMOL/L (ref 5–15)
AST SERPL-CCNC: 23 U/L (ref 1–40)
BASOPHILS # BLD AUTO: 0.02 10*3/MM3 (ref 0–0.2)
BASOPHILS NFR BLD AUTO: 0.4 % (ref 0–1.5)
BILIRUB SERPL-MCNC: 0.6 MG/DL (ref 0–1.2)
BUN SERPL-MCNC: 14 MG/DL (ref 8–23)
BUN/CREAT SERPL: 14.7 (ref 7–25)
CALCIUM SPEC-SCNC: 9.6 MG/DL (ref 8.6–10.5)
CHLORIDE SERPL-SCNC: 102 MMOL/L (ref 98–107)
CO2 SERPL-SCNC: 27.6 MMOL/L (ref 22–29)
CREAT SERPL-MCNC: 0.95 MG/DL (ref 0.76–1.27)
DEPRECATED RDW RBC AUTO: 44.9 FL (ref 37–54)
EGFRCR SERPLBLD CKD-EPI 2021: 84.5 ML/MIN/1.73
EOSINOPHIL # BLD AUTO: 0.06 10*3/MM3 (ref 0–0.4)
EOSINOPHIL NFR BLD AUTO: 1.2 % (ref 0.3–6.2)
ERYTHROCYTE [DISTWIDTH] IN BLOOD BY AUTOMATED COUNT: 13.9 % (ref 12.3–15.4)
GLOBULIN UR ELPH-MCNC: 3.8 GM/DL
GLUCOSE SERPL-MCNC: 122 MG/DL (ref 65–99)
HAPTOGLOB SERPL-MCNC: 119 MG/DL (ref 30–200)
HCT VFR BLD AUTO: 41.9 % (ref 37.5–51)
HGB BLD-MCNC: 13.2 G/DL (ref 13–17.7)
IMM GRANULOCYTES # BLD AUTO: 0.01 10*3/MM3 (ref 0–0.05)
IMM GRANULOCYTES NFR BLD AUTO: 0.2 % (ref 0–0.5)
IRON 24H UR-MRATE: 103 MCG/DL (ref 59–158)
IRON SATN MFR SERPL: 29 % (ref 20–50)
LYMPHOCYTES # BLD AUTO: 1.59 10*3/MM3 (ref 0.7–3.1)
LYMPHOCYTES NFR BLD AUTO: 31.9 % (ref 19.6–45.3)
MCH RBC QN AUTO: 27.4 PG (ref 26.6–33)
MCHC RBC AUTO-ENTMCNC: 31.5 G/DL (ref 31.5–35.7)
MCV RBC AUTO: 86.9 FL (ref 79–97)
MONOCYTES # BLD AUTO: 0.39 10*3/MM3 (ref 0.1–0.9)
MONOCYTES NFR BLD AUTO: 7.8 % (ref 5–12)
NEUTROPHILS NFR BLD AUTO: 2.91 10*3/MM3 (ref 1.7–7)
NEUTROPHILS NFR BLD AUTO: 58.5 % (ref 42.7–76)
NRBC BLD AUTO-RTO: 0.2 /100 WBC (ref 0–0.2)
PLATELET # BLD AUTO: 171 10*3/MM3 (ref 140–450)
PMV BLD AUTO: 11.2 FL (ref 6–12)
POTASSIUM SERPL-SCNC: 4.4 MMOL/L (ref 3.5–5.2)
PROT SERPL-MCNC: 7.9 G/DL (ref 6–8.5)
RBC # BLD AUTO: 4.82 10*6/MM3 (ref 4.14–5.8)
RETICS # AUTO: 0.05 10*6/MM3 (ref 0.02–0.13)
RETICS/RBC NFR AUTO: 0.96 % (ref 0.7–1.9)
SODIUM SERPL-SCNC: 139 MMOL/L (ref 136–145)
TIBC SERPL-MCNC: 356 MCG/DL (ref 298–536)
TRANSFERRIN SERPL-MCNC: 239 MG/DL (ref 200–360)
TSH SERPL DL<=0.05 MIU/L-ACNC: 2.31 UIU/ML (ref 0.27–4.2)
WBC NRBC COR # BLD: 4.98 10*3/MM3 (ref 3.4–10.8)

## 2022-04-08 NOTE — PROGRESS NOTES
Your recent labs revealed that your anemia has resolved.  Your iron studies are normal.  Your hemoglobin and hematocrit are normal.  Thyroid function is normal range.  Avoid NSAIDs (ibuprofen and naproxen).  Tylenol is safe to take.  We can hold off on doing the capsule endoscopy for now.

## 2022-05-16 ENCOUNTER — OFFICE VISIT (OUTPATIENT)
Dept: CARDIOLOGY | Facility: CLINIC | Age: 72
End: 2022-05-16

## 2022-05-16 VITALS
WEIGHT: 168 LBS | BODY MASS INDEX: 25.46 KG/M2 | HEIGHT: 68 IN | DIASTOLIC BLOOD PRESSURE: 70 MMHG | SYSTOLIC BLOOD PRESSURE: 140 MMHG | HEART RATE: 56 BPM

## 2022-05-16 DIAGNOSIS — I10 PRIMARY HYPERTENSION: ICD-10-CM

## 2022-05-16 DIAGNOSIS — R06.02 SHORTNESS OF BREATH: ICD-10-CM

## 2022-05-16 DIAGNOSIS — R42 DIZZINESS: ICD-10-CM

## 2022-05-16 DIAGNOSIS — R00.1 BRADYCARDIA, SINUS: ICD-10-CM

## 2022-05-16 DIAGNOSIS — R07.2 PRECORDIAL PAIN: Primary | ICD-10-CM

## 2022-05-16 PROCEDURE — 99204 OFFICE O/P NEW MOD 45 MIN: CPT | Performed by: INTERNAL MEDICINE

## 2022-05-16 PROCEDURE — 93000 ELECTROCARDIOGRAM COMPLETE: CPT | Performed by: INTERNAL MEDICINE

## 2022-05-16 RX ORDER — NITROGLYCERIN 0.4 MG/1
TABLET SUBLINGUAL
Qty: 100 TABLET | Refills: 11 | Status: SHIPPED | OUTPATIENT
Start: 2022-05-16

## 2022-05-16 RX ORDER — OMEPRAZOLE 40 MG/1
40 CAPSULE, DELAYED RELEASE ORAL DAILY PRN
COMMUNITY

## 2022-05-16 NOTE — PROGRESS NOTES
Chief Complaint   Patient presents with   • Establish Care     Referred for high risk of cardiac event. Last lab work was done on 04/07/22, in chart under labs. Patient had cath about 10 years ago at [facility], will attempt to obtain. Did not have any stents. Had CT of abdomen in March, in chart under imaging. Has had history of blood clots in intestines. Saw you about 10 years ago, will see if we can get records in [facility].   • Aspirin     Does not take aspirin daily.   • Chest Pain     Patient thought that he was having a heart attack last month, but did not go to ER. States that that BP at the time was 181/115, took 3 baby aspirin, started easing off and then took more baby aspirin later in the day. States that he had a sharp pain in the left side of chest, pain went down arm, was sweating profusely, got really tired.    • Shortness of Breath     States that since the episode of chest pain he has been getting short of breath with walking.         CARDIAC COMPLAINTS  chest pressure/discomfort, dyspnea and fatigue      Subjective   Cecil Gomez is a 72 y.o. male came in today for his initial cardiac evaluation.  He has history of chronic chest pain who also has history of hypercholesterolemia in the past.  I have seen him in the past for the same.  He was diagnosed with possible myocardial bridging in 2005 but the repeat cardiac catheterization done in 2008 as well as in 2012 did not show any significant ischemia.  He was last seen in 2015 at our office.  He also has history of chronic anemia and has been followed by gastroenterologist at [place].  He has undergone EGD and colonoscopy.  He also had history of CT scan of the abdomen done.  He had an episode about a month ago.  He was at home when he suddenly had severe chest pain in the middle part of the chest.  His blood pressure did go up to 180/115.  He took few aspirin after which the chest pain got better.  He did not seek medical attention on that day.  He had this  pain in the middle part of the chest radiating down the left arm associated with significant and profuse sweating and felt very tired.  After the next day the chest pain and sweating got better but the tiredness persisted for few more days.  Since that particular day he has been noticing increasing shortness of breath on exertion.  He also has been having episodes of dizziness.  He is now referred for further evaluation.  He did undergo lab work and found to have a normal blood count.  His TSH was within normal limit.  His electrolytes are normal.  His cholesterol was not checked.  He used to smoke a pack a day for 15 years but did quit in .  His father  at the age of 47 secondary to heart disease and his mother  at the age of 68 due to heart disease.  Her brother  at age of 74 secondary to aneurysm and stroke    Past Surgical History:   Procedure Laterality Date   • ABDOMINAL SURGERY     • ABDOMINAL WASHOUT     • CARDIAC CATHETERIZATION  2012    Normal Coronaries. EF 55%. few PVC   • CARDIAC CATHETERIZATION  2005    30% LAD. Myocardial Bridging   • CARDIAC CATHETERIZATION  2008    Normal Coronaries   • CARDIOVASCULAR STRESS TEST  2015    5 Min.31 Secs. 7.0 METS. 99% THR. BP- 136/70. EF 58%. Negative.   • CHOLECYSTECTOMY     • CIRCUMCISION N/A 2020    Procedure: CIRCUMCISION;  Surgeon: Lalo Camejo MD;  Location: [facility];  Service: Urology   • COLONOSCOPY      pre- cancerous colon polyp   • COLONOSCOPY N/A 2021    COLONOSCOPY- Melvina Martinez MD   • CONVERTED (HISTORICAL) HOLTER  2013    Avg 73. . Rare PAC. 3% Artifacts   • ECHO - CONVERTED  2015    EF 60%. Trace MR. RVSP- 35 mmHg   • ENDOSCOPY     • ENDOSCOPY N/A 2021    EGD- Melvina Martinez   • EXTRACORPOREAL SHOCK WAVE LITHOTRIPSY (ESWL) Left 2020    LITHOTRIPSY;  Surgeon: Lalo Camejo   • EYE SURGERY Bilateral     iol   • HERNIA REPAIR      • KIDNEY STONE SURGERY     • OTHER SURGICAL HISTORY  2015    Pulse O2- Abnormal       Current Outpatient Medications   Medication Sig Dispense Refill   • Acetaminophen (TYLENOL PO) Take 1 tablet by mouth Daily As Needed.     • aspirin 81 MG EC tablet Take 81 mg by mouth Daily As Needed.     • omeprazole (priLOSEC) 40 MG capsule Take 40 mg by mouth Daily As Needed.     • nitroglycerin (NITROSTAT) 0.4 MG SL tablet 1 under the tongue as needed for angina, may repeat q5mins for up three doses 100 tablet 11     No current facility-administered medications for this visit.           ALLERGIES:  Reglan [metoclopramide]    Past Medical History:   Diagnosis Date   • Anemia    • Arthritis    • Blood clot in abdominal vein    • BPH (benign prostatic hypertrophy)    • CHF (congestive heart failure) ([facility])    • Chronic kidney disease    • Colon polyp    • Depression    • Diabetes ([facility])    • Disease of thyroid gland    • Diverticulitis    • Elevated PSA    • GERD (gastroesophageal reflux disease)    • GERD (gastroesophageal reflux disease)    • H/O blood clots    • History of transfusion    • Hyperlipidemia    • Hypertension    • MI (myocardial infarction) ([facility])    • Migraines        Social History     Tobacco Use   Smoking Status Former Smoker   • Packs/day: 1.00   • Years: 50.00   • Pack years: 50.00   • Types: Pipe, Cigarettes   • Quit date:    • Years since quittin.3   Smokeless Tobacco Never Used          Family History   Problem Relation Age of Onset   • Heart disease Mother    • Heart failure Mother    • Heart attack Father    • Heart disease Father    • Stroke Brother    • Aneurysm Brother    • Heart disease Brother        Review of Systems   Constitutional: Positive for malaise/fatigue. Negative for decreased appetite.   HENT: Negative for congestion and sore throat.    Eyes: Negative for blurred vision.   Cardiovascular: Positive for chest pain and dyspnea on exertion.   Respiratory: Positive for  "shortness of breath. Negative for snoring.    Endocrine: Negative for cold intolerance and heat intolerance.   Hematologic/Lymphatic: Negative for adenopathy. Does not bruise/bleed easily.   Skin: Negative for itching, nail changes and skin cancer.   Musculoskeletal: Negative for arthritis and myalgias.   Gastrointestinal: Negative for abdominal pain, dysphagia and heartburn.   Genitourinary: Negative for bladder incontinence and frequency.   Neurological: Positive for dizziness and light-headedness. Negative for seizures and vertigo.   Psychiatric/Behavioral: Negative for altered mental status.   Allergic/Immunologic: Negative for environmental allergies and hives.       Diabetes- No  Thyroid- normal    Objective     /70 (BP Location: Right arm)   Pulse 56   Ht 172.7 cm (67.99\")   Wt 76.2 kg (168 lb)   BMI 25.55 kg/m²     Vitals and nursing note reviewed.   Constitutional:       Appearance: Healthy appearance. Not in distress.   Eyes:      Conjunctiva/sclera: Conjunctivae normal.      Pupils: Pupils are equal, round, and reactive to light.   HENT:      Head: Normocephalic.   Pulmonary:      Effort: Pulmonary effort is normal.      Breath sounds: Normal breath sounds.   Cardiovascular:      PMI at left midclavicular line. Bradycardia present. Regular rhythm.      Murmurs: There is a systolic murmur.   Abdominal:      General: Bowel sounds are normal.      Palpations: Abdomen is soft.   Musculoskeletal: Normal range of motion.      Cervical back: Normal range of motion and neck supple. Skin:     General: Skin is warm and dry.   Neurological:      Mental Status: Alert, oriented to person, place, and time and oriented to person, place and time.           ECG 12 Lead    Date/Time: 5/16/2022 2:10 PM  Performed by: Fani Cui MD  Authorized by: Fani Cui MD   Comparison: compared with previous ECG from 1/29/2020  Similar to previous ECG  Rhythm: sinus bradycardia  Rate: bradycardic  QRS " axis: left  Other findings: left ventricular hypertrophy    Clinical impression: abnormal EKG              @ASSESSMENT PLAN@  BMI is >= 25 and < 30. (Overweight) The following options were offered after discussion: nutrition counseling/recommendations     Diagnoses and all orders for this visit:    1. Precordial pain (Primary)  -     Stress Test With Myocardial Perfusion One Day; Future  -     nitroglycerin (NITROSTAT) 0.4 MG SL tablet; 1 under the tongue as needed for angina, may repeat q5mins for up three doses  Dispense: 100 tablet; Refill: 11    2. Primary hypertension  -     Stress Test With Myocardial Perfusion One Day; Future    3. Shortness of breath  -     Adult Transthoracic Echo Complete W/ Cont if Necessary Per Protocol; Future    4. Bradycardia, sinus  -     Holter Monitor - 72 Hour Up To 15 Days; Future    5. Dizziness  -     Adult Transthoracic Echo Complete W/ Cont if Necessary Per Protocol; Future  -     Holter Monitor - 72 Hour Up To 15 Days; Future    At baseline he is mildly bradycardic.  His blood pressure is normal.  His EKG shows sinus bradycardia, left axis deviation, borderline LVH.  His clinical examination reveals a BMI which is just around 26.  He does have soft systolic murmur at the mitral area.  He has normal peripheral pulse and no pedal edema.    Regarding the chest pain, it appears to be very atypical but he does have multiple risk factors including strong family history.  At this time I gave him a prescription for sublingual nitroglycerin to be used only as needed.  I did schedule him to undergo stress test.  He is not able to walk much on the treadmill so [facility]    Regarding his blood pressure, It appears to be stable at this time though it is upper limit of normal.  His blood pressure was slightly elevated at the gastroenterologist office also.  If during the stress test he has significant hypertensive response, he may need to go on an ACE  inhibitor.    Regarding his shortness of breath, need to rule out cardiac cause.  I schedule him to undergo an echocardiogram to evaluate for LVH, LV function, valvular structures as well as the PA pressure    Regarding the bradycardia, he is not on any negative inotropic medications.  He does have episodes of dizziness.  I did place a Holter monitor to look for any significant bradyarrhythmia.  If he develops any AV block or significant sinus bradycardia then he may need a pacemaker.    Regarding the dizziness, it could be related to the heart rate so we will monitor the Holter monitor.  I also want to rule out any valvular heart disease so we will get an echocardiogram.    Regarding advanced directive, I talked to him about living will and power of  and gave him booklets regarding that.               Electronically signed by Fani Cui MD May 16, 2022 14:02 EDT

## 2022-06-02 ENCOUNTER — OFFICE VISIT (OUTPATIENT)
Dept: GASTROENTEROLOGY | Facility: CLINIC | Age: 72
End: 2022-06-02

## 2022-06-02 ENCOUNTER — LAB (OUTPATIENT)
Dept: LAB | Facility: HOSPITAL | Age: 72
End: 2022-06-02

## 2022-06-02 VITALS
WEIGHT: 166.2 LBS | HEART RATE: 53 BPM | DIASTOLIC BLOOD PRESSURE: 68 MMHG | OXYGEN SATURATION: 97 % | HEIGHT: 68 IN | SYSTOLIC BLOOD PRESSURE: 136 MMHG | BODY MASS INDEX: 25.19 KG/M2

## 2022-06-02 DIAGNOSIS — D50.9 IRON DEFICIENCY ANEMIA, UNSPECIFIED IRON DEFICIENCY ANEMIA TYPE: Primary | ICD-10-CM

## 2022-06-02 DIAGNOSIS — D50.9 IRON DEFICIENCY ANEMIA, UNSPECIFIED IRON DEFICIENCY ANEMIA TYPE: ICD-10-CM

## 2022-06-02 PROCEDURE — 83540 ASSAY OF IRON: CPT

## 2022-06-02 PROCEDURE — 84466 ASSAY OF TRANSFERRIN: CPT

## 2022-06-02 PROCEDURE — 99213 OFFICE O/P EST LOW 20 MIN: CPT | Performed by: INTERNAL MEDICINE

## 2022-06-02 PROCEDURE — 85025 COMPLETE CBC W/AUTO DIFF WBC: CPT | Performed by: INTERNAL MEDICINE

## 2022-06-02 PROCEDURE — 36415 COLL VENOUS BLD VENIPUNCTURE: CPT

## 2022-06-02 RX ORDER — LOSARTAN POTASSIUM 50 MG/1
TABLET ORAL
COMMUNITY
Start: 2022-05-18

## 2022-06-02 NOTE — PROGRESS NOTES
Subjective   Cecil Gomez is a 72 y.o. male who presents to the office today as a follow up appointment regarding Iron deficiency anemia       History of Present Illness:  Previous History: The patient presents for follow-up iron deficiency anemia.  He recently underwent an EGD and colonoscopy there was no source for the anemia found at the time of these 2 procedures.  It appeared that at 1 point the anemia had resolved.  However, he tells me today that he is anemic again.  He is not on iron supplement.  He denies NSAID use.  He does take a baby aspirin daily.  He complains of abdominal soreness along his surgical scars.  He has a long midline scar.  He has had multiple hernia repairs along the scar. This was performed in 2016 at [facility].  He was taking an ASA but this was stopped because of the anemia. His wife gave him 3 baby ASA.  He had a large bowel movement and went to bed.  He slept well and felt good when he woke up.   He had an episode of sharp pain in the left chest, left arm numbness and diaphoresis.     Interval history:  The patient presents for follow up anemia.  He had normal labs last visit.  He has had 2 MIs in the past but does not have stents.  He had been on ASA in the past but has stopped this because of the anemia.  He was not taking NSAIDS.  He denies BRBPR or melena.  His appetite is good.  No weight loss. Recent CT scan did not show a source for anemia.      Review of Systems:  Review of Systems   Constitutional: Positive for fatigue. Negative for chills and fever.   HENT: Negative for trouble swallowing.    Eyes: Negative.    Respiratory: Negative for cough, choking, chest tightness and shortness of breath.    Cardiovascular: Negative for chest pain.   Gastrointestinal: Negative for abdominal distention, abdominal pain, anal bleeding, blood in stool, constipation, diarrhea, nausea and vomiting.   Endocrine: Negative.    Genitourinary: Negative for difficulty urinating and  hematuria.   Musculoskeletal: Positive for neck pain. Negative for back pain.   Skin: Negative.    Allergic/Immunologic: Negative for environmental allergies and food allergies.   Neurological: Positive for headaches. Negative for dizziness and light-headedness.   Hematological: Bruises/bleeds easily.   Psychiatric/Behavioral: Negative.        Past Medical History:  Past Medical History:   Diagnosis Date   • Anemia    • Arthritis    • Blood clot in abdominal vein    • BPH (benign prostatic hypertrophy)    • CHF (congestive heart failure) (HCC)    • Chronic kidney disease    • Colon polyp    • Depression    • Diabetes (HCC)    • Disease of thyroid gland    • Diverticulitis    • Elevated PSA    • GERD (gastroesophageal reflux disease)    • GERD (gastroesophageal reflux disease)    • H/O blood clots    • History of transfusion    • Hyperlipidemia    • Hypertension    • MI (myocardial infarction) (HCC)    • Migraines        Past Surgical History:  Past Surgical History:   Procedure Laterality Date   • ABDOMINAL SURGERY     • ABDOMINAL WASHOUT     • CARDIAC CATHETERIZATION  01/31/2012    Normal Coronaries. EF 55%. few PVC   • CARDIAC CATHETERIZATION  06/02/2005    30% LAD. Myocardial Bridging   • CARDIAC CATHETERIZATION  12/07/2008    Normal Coronaries   • CARDIOVASCULAR STRESS TEST  09/14/2015    5 Min.31 Secs. 7.0 METS. 99% THR. BP- 136/70. EF 58%. Negative.   • CHOLECYSTECTOMY     • CIRCUMCISION N/A 01/31/2020    Procedure: CIRCUMCISION;  Surgeon: Lalo Camejo MD;  Location: [facility];  Service: Urology   • COLONOSCOPY  2013    pre- cancerous colon polyp   • COLONOSCOPY N/A 08/25/2021    COLONOSCOPY- Melvina Martinez MD   • CONVERTED (HISTORICAL) HOLTER  06/08/2013    Avg 73. . Rare PAC. 3% Artifacts   • ECHO - CONVERTED  09/14/2015    EF 60%. Trace MR. RVSP- 35 mmHg   • ENDOSCOPY     • ENDOSCOPY N/A 08/25/2021    EGD- Melvina Martinez   • EXTRACORPOREAL SHOCK WAVE LITHOTRIPSY  "(ESWL) Left 2020    LITHOTRIPSY;  Surgeon: Lalo Camejo   • EYE SURGERY Bilateral     iol   • HERNIA REPAIR     • KIDNEY STONE SURGERY     • OTHER SURGICAL HISTORY  2015    Pulse O2- Abnormal       Family History:  Family History   Problem Relation Age of Onset   • Heart disease Mother    • Heart failure Mother    • Heart attack Father    • Heart disease Father    • Stroke Brother    • Aneurysm Brother    • Heart disease Brother        Social History:  Social History     Socioeconomic History   • Marital status:    Tobacco Use   • Smoking status: Former Smoker     Packs/day: 1.00     Years: 50.00     Pack years: 50.00     Types: Pipe, Cigarettes     Quit date:      Years since quittin.4   • Smokeless tobacco: Never Used   Vaping Use   • Vaping Use: Never used   Substance and Sexual Activity   • Alcohol use: Not Currently     Comment: Quit in    • Drug use: Not Currently     Types: Marijuana     Comment: Quit in    • Sexual activity: Defer       Current Medication List:    Current Outpatient Medications:   •  Acetaminophen (TYLENOL PO), Take 1 tablet by mouth Daily As Needed., Disp: , Rfl:   •  aspirin 81 MG EC tablet, Take 81 mg by mouth Daily As Needed., Disp: , Rfl:   •  losartan (COZAAR) 50 MG tablet, TAKE 1 TABLET ORALLY ONCE A DAY 30 DAYS, Disp: , Rfl:   •  nitroglycerin (NITROSTAT) 0.4 MG SL tablet, 1 under the tongue as needed for angina, may repeat q5mins for up three doses, Disp: 100 tablet, Rfl: 11  •  omeprazole (priLOSEC) 40 MG capsule, Take 40 mg by mouth Daily As Needed., Disp: , Rfl:     Allergies:   Reglan [metoclopramide]    Vitals:  /68 (BP Location: Left arm, Patient Position: Sitting, Cuff Size: Adult)   Pulse 53   Ht 172.7 cm (68\")   Wt 75.4 kg (166 lb 3.2 oz)   SpO2 97%   BMI 25.27 kg/m²     Physical Exam:  Physical Exam  Constitutional:       Appearance: He is normal weight.   HENT:      Head: Normocephalic and atraumatic.      Nose: Nose " normal. No congestion or rhinorrhea.   Eyes:      General: No scleral icterus.     Extraocular Movements: Extraocular movements intact.      Conjunctiva/sclera: Conjunctivae normal.      Pupils: Pupils are equal, round, and reactive to light.   Cardiovascular:      Rate and Rhythm: Normal rate and regular rhythm.      Pulses: Normal pulses.      Heart sounds: Normal heart sounds.   Pulmonary:      Effort: Pulmonary effort is normal.      Breath sounds: Normal breath sounds.   Abdominal:      General: Abdomen is flat. Bowel sounds are normal. There is no distension.      Palpations: Abdomen is soft. There is no shifting dullness, fluid wave, hepatomegaly, splenomegaly, mass or pulsatile mass.      Tenderness: There is no abdominal tenderness. There is no guarding or rebound.      Hernia: No hernia is present.   Musculoskeletal:         General: No swelling or tenderness.      Cervical back: Normal range of motion and neck supple.   Skin:     General: Skin is warm and dry.      Coloration: Skin is not jaundiced.   Neurological:      General: No focal deficit present.      Mental Status: He is alert and oriented to person, place, and time.   Psychiatric:         Mood and Affect: Mood normal.         Behavior: Behavior normal.         Results Review:  Lab Results:   Hospital Outpatient Visit on 05/16/2022   Component Date Value Ref Range Status   • Target HR (85%) 05/16/2022 125  bpm In process   • Max. Pred. HR (100%) 05/16/2022 147  bpm In process       Assessment & Plan     Visit Diagnoses:    ICD-10-CM ICD-9-CM   1. Iron deficiency anemia, unspecified iron deficiency anemia type  D50.9 280.9       Plan:  Orders Placed This Encounter   Procedures   • Iron Profile   • CBC & Differential       I will check the patient's hemoglobin/hematocrit, iron/TIBC.  If normal, I will release the patient from my care.  If his levels have decreased, I will proceed with capsule endoscopy.      MEDS ORDERED DURING VISIT:  No orders of  the defined types were placed in this encounter.      Return if symptoms worsen or fail to improve.             This document has been electronically signed by Melvina Martinez MD   June 2, 2022 13:53 EDT      Part of this note may be an electronic transcription/translation of spoken language to printed text using the Dragon Dictation System.

## 2022-06-03 LAB
BASOPHILS # BLD AUTO: 0.01 10*3/MM3 (ref 0–0.2)
BASOPHILS NFR BLD AUTO: 0.2 % (ref 0–1.5)
DEPRECATED RDW RBC AUTO: 44.7 FL (ref 37–54)
EOSINOPHIL # BLD AUTO: 0.05 10*3/MM3 (ref 0–0.4)
EOSINOPHIL NFR BLD AUTO: 0.9 % (ref 0.3–6.2)
ERYTHROCYTE [DISTWIDTH] IN BLOOD BY AUTOMATED COUNT: 14.6 % (ref 12.3–15.4)
HCT VFR BLD AUTO: 35.7 % (ref 37.5–51)
HGB BLD-MCNC: 11.5 G/DL (ref 13–17.7)
IMM GRANULOCYTES # BLD AUTO: 0.01 10*3/MM3 (ref 0–0.05)
IMM GRANULOCYTES NFR BLD AUTO: 0.2 % (ref 0–0.5)
IRON 24H UR-MRATE: 80 MCG/DL (ref 59–158)
IRON SATN MFR SERPL: 26 % (ref 20–50)
LYMPHOCYTES # BLD AUTO: 2.24 10*3/MM3 (ref 0.7–3.1)
LYMPHOCYTES NFR BLD AUTO: 42.2 % (ref 19.6–45.3)
MCH RBC QN AUTO: 27.3 PG (ref 26.6–33)
MCHC RBC AUTO-ENTMCNC: 32.2 G/DL (ref 31.5–35.7)
MCV RBC AUTO: 84.8 FL (ref 79–97)
MONOCYTES # BLD AUTO: 0.46 10*3/MM3 (ref 0.1–0.9)
MONOCYTES NFR BLD AUTO: 8.7 % (ref 5–12)
NEUTROPHILS NFR BLD AUTO: 2.54 10*3/MM3 (ref 1.7–7)
NEUTROPHILS NFR BLD AUTO: 47.8 % (ref 42.7–76)
NRBC BLD AUTO-RTO: 0 /100 WBC (ref 0–0.2)
PLATELET # BLD AUTO: 151 10*3/MM3 (ref 140–450)
PMV BLD AUTO: 10.9 FL (ref 6–12)
RBC # BLD AUTO: 4.21 10*6/MM3 (ref 4.14–5.8)
TIBC SERPL-MCNC: 310 MCG/DL (ref 298–536)
TRANSFERRIN SERPL-MCNC: 208 MG/DL (ref 200–360)
WBC NRBC COR # BLD: 5.31 10*3/MM3 (ref 3.4–10.8)

## 2022-06-07 ENCOUNTER — TELEPHONE (OUTPATIENT)
Dept: GASTROENTEROLOGY | Facility: CLINIC | Age: 72
End: 2022-06-07

## 2022-06-07 DIAGNOSIS — D50.9 IRON DEFICIENCY ANEMIA, UNSPECIFIED IRON DEFICIENCY ANEMIA TYPE: Primary | ICD-10-CM

## 2022-06-07 NOTE — TELEPHONE ENCOUNTER
Please make an appt with me in 3 months. He has a lot of appointments going on right now and wants to wait to see if the anemia persists.

## 2022-06-07 NOTE — PROGRESS NOTES
Your recent hemoglobin and hematocrit have dropped slightly since 2 months ago.  Your iron studies were normal.  We will proceed with upper GI series with small bowel follow-through to evaluate the small bowel for strictures.  If there is no narrowing or stricturing of the small bowel, we will proceed with capsule endoscopy.  The office will call you to set up this upper GI series with small bowel follow-through.

## 2022-06-07 NOTE — TELEPHONE ENCOUNTER
Patients wife called and does not understand why the patient needs upper GI or pill cam at this time, she wants you to call her and explain it to her please

## 2022-06-10 NOTE — TELEPHONE ENCOUNTER
Called and spoke to patients wife and she states that the primary care is going to take over care as of right now due to the drive over here, but they will call back if they need another appointment

## 2022-06-21 ENCOUNTER — HOSPITAL ENCOUNTER (OUTPATIENT)
Dept: CARDIOLOGY | Facility: HOSPITAL | Age: 72
Discharge: HOME | End: 2022-06-21

## 2022-06-21 DIAGNOSIS — I10 PRIMARY HYPERTENSION: ICD-10-CM

## 2022-06-21 DIAGNOSIS — R06.02 SHORTNESS OF BREATH: ICD-10-CM

## 2022-06-21 DIAGNOSIS — R42 DIZZINESS: ICD-10-CM

## 2022-06-21 DIAGNOSIS — R07.2 PRECORDIAL PAIN: ICD-10-CM

## 2022-06-21 LAB
AORTIC DIMENSIONLESS INDEX: 0.6 (DI)
BH CV ECHO MEAS - ACS: 1.3 CM
BH CV ECHO MEAS - AO MAX PG: 9.1 MMHG
BH CV ECHO MEAS - AO MEAN PG: 4.6 MMHG
BH CV ECHO MEAS - AO ROOT DIAM: 2.7 CM
BH CV ECHO MEAS - AO V2 MAX: 150.5 CM/SEC
BH CV ECHO MEAS - AO V2 VTI: 41.9 CM
BH CV ECHO MEAS - AVA(I,D): 2.13 CM2
BH CV ECHO MEAS - EDV(CUBED): 52.7 ML
BH CV ECHO MEAS - EDV(MOD-SP4): 65.4 ML
BH CV ECHO MEAS - EF(MOD-SP4): 55.7 %
BH CV ECHO MEAS - ESV(CUBED): 22.4 ML
BH CV ECHO MEAS - ESV(MOD-SP4): 29 ML
BH CV ECHO MEAS - FS: 24.8 %
BH CV ECHO MEAS - IVS/LVPW: 1.33 CM
BH CV ECHO MEAS - IVSD: 1.46 CM
BH CV ECHO MEAS - LA DIMENSION: 4.1 CM
BH CV ECHO MEAS - LAT PEAK E' VEL: 9.2 CM/SEC
BH CV ECHO MEAS - LV DIASTOLIC VOL/BSA (35-75): 34.6 CM2
BH CV ECHO MEAS - LV MASS(C)D: 165.8 GRAMS
BH CV ECHO MEAS - LV MAX PG: 3.4 MMHG
BH CV ECHO MEAS - LV MEAN PG: 2.8 MMHG
BH CV ECHO MEAS - LV SYSTOLIC VOL/BSA (12-30): 15.4 CM2
BH CV ECHO MEAS - LV V1 MAX: 92.1 CM/SEC
BH CV ECHO MEAS - LV V1 VTI: 29.1 CM
BH CV ECHO MEAS - LVIDD: 3.8 CM
BH CV ECHO MEAS - LVIDS: 2.8 CM
BH CV ECHO MEAS - LVOT AREA: 3.1 CM2
BH CV ECHO MEAS - LVOT DIAM: 1.98 CM
BH CV ECHO MEAS - LVPWD: 1.1 CM
BH CV ECHO MEAS - MED PEAK E' VEL: 7.4 CM/SEC
BH CV ECHO MEAS - MV A MAX VEL: 51 CM/SEC
BH CV ECHO MEAS - MV DEC SLOPE: 325.7 CM/SEC2
BH CV ECHO MEAS - MV E MAX VEL: 98.1 CM/SEC
BH CV ECHO MEAS - MV E/A: 1.92
BH CV ECHO MEAS - MV MAX PG: 3.3 MMHG
BH CV ECHO MEAS - MV MEAN PG: 1.25 MMHG
BH CV ECHO MEAS - MV P1/2T: 92.8 MSEC
BH CV ECHO MEAS - MV V2 VTI: 36.3 CM
BH CV ECHO MEAS - MVA(P1/2T): 2.37 CM2
BH CV ECHO MEAS - MVA(VTI): 2.45 CM2
BH CV ECHO MEAS - PA V2 MAX: 86 CM/SEC
BH CV ECHO MEAS - RAP SYSTOLE: 10 MMHG
BH CV ECHO MEAS - RV MAX PG: 1 MMHG
BH CV ECHO MEAS - RV V1 MAX: 50 CM/SEC
BH CV ECHO MEAS - RV V1 VTI: 14.5 CM
BH CV ECHO MEAS - RVDD: 3.2 CM
BH CV ECHO MEAS - RVSP: 46.6 MMHG
BH CV ECHO MEAS - SI(MOD-SP4): 19.3 ML/M2
BH CV ECHO MEAS - SV(LVOT): 89.1 ML
BH CV ECHO MEAS - SV(MOD-SP4): 36.4 ML
BH CV ECHO MEAS - TAPSE (>1.6): 2.06 CM
BH CV ECHO MEAS - TR MAX PG: 36.6 MMHG
BH CV ECHO MEAS - TR MAX VEL: 302.7 CM/SEC
BH CV ECHO MEASUREMENTS AVERAGE E/E' RATIO: 11.82
BH CV REST NUCLEAR ISOTOPE DOSE: 10 MCI
BH CV STRESS COMMENTS STAGE 1: NORMAL
BH CV STRESS DOSE REGADENOSON STAGE 1: 0.4
BH CV STRESS DURATION MIN STAGE 1: 0
BH CV STRESS DURATION SEC STAGE 1: 10
BH CV STRESS NUCLEAR ISOTOPE DOSE: 30 MCI
BH CV STRESS PROTOCOL 1: NORMAL
BH CV STRESS RECOVERY BP: NORMAL MMHG
BH CV STRESS RECOVERY HR: 65 BPM
BH CV STRESS STAGE 1: 1
LEFT ATRIUM VOLUME INDEX: 26.9 ML/M2
LV EF NUC BP: 57 %
MAXIMAL PREDICTED HEART RATE: 147 BPM
MAXIMAL PREDICTED HEART RATE: 147 BPM
PERCENT MAX PREDICTED HR: 44.22 %
STRESS BASELINE BP: NORMAL MMHG
STRESS BASELINE HR: 50 BPM
STRESS PERCENT HR: 52 %
STRESS POST PEAK BP: NORMAL MMHG
STRESS POST PEAK HR: 65 BPM
STRESS TARGET HR: 125 BPM
STRESS TARGET HR: 125 BPM

## 2022-06-21 PROCEDURE — 93306 TTE W/DOPPLER COMPLETE: CPT | Performed by: INTERNAL MEDICINE

## 2022-06-21 PROCEDURE — 25010000002 REGADENOSON 0.4 MG/5ML SOLUTION: Performed by: INTERNAL MEDICINE

## 2022-06-21 PROCEDURE — 0 TECHNETIUM SESTAMIBI: Performed by: INTERNAL MEDICINE

## 2022-06-21 PROCEDURE — A9500 TC99M SESTAMIBI: HCPCS | Performed by: INTERNAL MEDICINE

## 2022-06-21 PROCEDURE — 78452 HT MUSCLE IMAGE SPECT MULT: CPT

## 2022-06-21 PROCEDURE — 93306 TTE W/DOPPLER COMPLETE: CPT

## 2022-06-21 PROCEDURE — 78452 HT MUSCLE IMAGE SPECT MULT: CPT | Performed by: INTERNAL MEDICINE

## 2022-06-21 PROCEDURE — 93017 CV STRESS TEST TRACING ONLY: CPT

## 2022-06-21 PROCEDURE — 93018 CV STRESS TEST I&R ONLY: CPT | Performed by: INTERNAL MEDICINE

## 2022-06-21 RX ADMIN — REGADENOSON 0.4 MG: 0.08 INJECTION, SOLUTION INTRAVENOUS at 10:30

## 2022-06-21 RX ADMIN — TECHNETIUM TC 99M SESTAMIBI 1 DOSE: 1 INJECTION INTRAVENOUS at 10:30

## 2022-06-21 RX ADMIN — TECHNETIUM TC 99M SESTAMIBI 1 DOSE: 1 INJECTION INTRAVENOUS at 08:28

## 2022-06-22 DIAGNOSIS — I10 PRIMARY HYPERTENSION: Primary | ICD-10-CM

## 2022-06-22 RX ORDER — AMLODIPINE BESYLATE 5 MG/1
5 TABLET ORAL DAILY
Qty: 90 TABLET | Refills: 3 | Status: SHIPPED | OUTPATIENT
Start: 2022-06-22

## 2022-06-22 NOTE — TELEPHONE ENCOUNTER
----- Message from Fani Cui MD sent at 6/22/2022  6:39 AM EDT -----  Norvasc.  Cath if more symptoms

## 2023-07-23 ENCOUNTER — HOSPITAL ENCOUNTER (OUTPATIENT)
Dept: MRI IMAGING | Facility: HOSPITAL | Age: 73
Discharge: HOME | End: 2023-07-23
Admitting: FAMILY MEDICINE
Payer: MEDICARE

## 2023-07-23 DIAGNOSIS — G46.4 CEREBELLAR STROKE SYNDROME: ICD-10-CM

## 2023-07-23 PROCEDURE — 70551 MRI BRAIN STEM W/O DYE: CPT

## 2023-07-23 PROCEDURE — 70551 MRI BRAIN STEM W/O DYE: CPT | Performed by: RADIOLOGY

## 2023-07-25 ENCOUNTER — OFFICE VISIT (OUTPATIENT)
Dept: CARDIOLOGY | Facility: CLINIC | Age: 73
End: 2023-07-25
Payer: MEDICARE

## 2023-07-25 VITALS
HEART RATE: 68 BPM | BODY MASS INDEX: 24.49 KG/M2 | DIASTOLIC BLOOD PRESSURE: 60 MMHG | WEIGHT: 161.6 LBS | HEIGHT: 68 IN | SYSTOLIC BLOOD PRESSURE: 120 MMHG

## 2023-07-25 DIAGNOSIS — I10 PRIMARY HYPERTENSION: Primary | ICD-10-CM

## 2023-07-25 DIAGNOSIS — R42 DIZZINESS: ICD-10-CM

## 2023-07-25 DIAGNOSIS — R06.02 SHORTNESS OF BREATH: ICD-10-CM

## 2023-07-25 DIAGNOSIS — E78.5 HYPERLIPIDEMIA LDL GOAL <100: ICD-10-CM

## 2023-07-25 DIAGNOSIS — G45.9 TIA (TRANSIENT ISCHEMIC ATTACK): ICD-10-CM

## 2023-07-25 DIAGNOSIS — R00.2 PALPITATIONS: ICD-10-CM

## 2023-07-25 PROCEDURE — 99214 OFFICE O/P EST MOD 30 MIN: CPT | Performed by: NURSE PRACTITIONER

## 2023-07-25 PROCEDURE — 3074F SYST BP LT 130 MM HG: CPT | Performed by: NURSE PRACTITIONER

## 2023-07-25 PROCEDURE — 3078F DIAST BP <80 MM HG: CPT | Performed by: NURSE PRACTITIONER

## 2023-07-25 PROCEDURE — 1159F MED LIST DOCD IN RCRD: CPT | Performed by: NURSE PRACTITIONER

## 2023-07-25 PROCEDURE — 1160F RVW MEDS BY RX/DR IN RCRD: CPT | Performed by: NURSE PRACTITIONER

## 2023-07-25 RX ORDER — ASPIRIN 81 MG/1
81 TABLET ORAL DAILY
Start: 2023-07-25

## 2023-07-25 RX ORDER — ROSUVASTATIN CALCIUM 10 MG/1
10 TABLET, COATED ORAL DAILY
Qty: 90 TABLET | Refills: 3
Start: 2023-07-25

## 2023-07-25 NOTE — PROGRESS NOTES
Chief Complaint   Patient presents with    Follow-up     Cardiac management    Lab     Last labs in May per PCP.    Fatigue     Feels tired all the time, no energy. He reports worsening since last visit.    Dizziness     Can be standing and when he turns around, he becomes dizzy. Has had multiple falls. Also notices when standing up. Having more headaches.     Confusion     Had episode in April, woke up talking to mother that had past years ago. He did not know any of his family. He went to [facility] ER. He did have MRI over the weekend, see chart.    Medication     He did not start amlodpine that was ordered after stress test.     Subjective       Cecil Gomez is a 73 y.o. male with chronic, mild anemia, hyperlipidemia, normal cardiac cath in 2012 referred back to our office in May 2022 secondary to hypertensive event with chest pain.  Repeat cardiac work-up showed normal EF, Max /72, questionable small apical ischemia.  Amlodipine 5 mg added.  Holter showed brief SVT, no AF. We did not see him after that.    He returns today for evaluation of possible TIA in April 2023. His wife observed him waking early am with confusion, speaking to dead relatives, raising arms in the air. Presented to [facility]. CT head advanced microangiopathy, remote bilateral cerebellar infarcts, nothing acute. H/H 12.5/38.9, normal CMP, normal UA, positive strep pharyngitis, treated with PCN injection. Apparently symptoms improved later that day.  No recurrent symptoms. MRI 7/2323 showed no acute stroke, chronic bilateral cerebellar infarct, advanced chronic small vessel ischemic disease.  No acute changes. He denies chest pain, dyspnea, palpitations, mild dizziness with standing or turning head. He has witnessed apnea during sleep. Did not start amlodipine. He was given rosuvastatin by Dr. Guerrero but has not started.        Cardiac History:    Past Surgical History:   Procedure Laterality Date    ABDOMINAL SURGERY      ABDOMINAL WASHOUT       CARDIAC CATHETERIZATION  01/31/2012    Normal Coronaries. EF 55%. few PVC    CARDIAC CATHETERIZATION  06/02/2005    30% LAD. Myocardial Bridging    CARDIAC CATHETERIZATION  12/07/2008    Normal Coronaries    CARDIOVASCULAR STRESS TEST  09/14/2015    5 Min.31 Secs. 7.0 METS. 99% THR. BP- 136/70. EF 58%. Negative.    CARDIOVASCULAR STRESS TEST  06/21/2022    Lexiscan- EF 57%. BP- 165/72. R/O Apical Ischemia    CHOLECYSTECTOMY      CIRCUMCISION N/A 01/31/2020    Procedure: CIRCUMCISION;  Surgeon: Lalo Camejo MD;  Location: [facility];  Service: Urology    COLONOSCOPY  2013    pre- cancerous colon polyp    COLONOSCOPY N/A 08/25/2021    COLONOSCOPY- Melvina Martinez MD    CONVERTED (HISTORICAL) HOLTER  06/08/2013    Avg 73. . Rare PAC. 3% Artifacts    CONVERTED (HISTORICAL) HOLTER  06/22/2022    7 Days. Avg 66. . 5 SVT    ECHO - CONVERTED  09/14/2015    EF 60%. Trace MR. RVSP- 35 mmHg    ECHO - CONVERTED  06/21/2022    EF 55%. LA- 4.1. Mild MR & AI. RVSP- 47 mmHg    ENDOSCOPY      ENDOSCOPY N/A 08/25/2021    EGD- Melvina Martinez    EXTRACORPOREAL SHOCK WAVE LITHOTRIPSY (ESWL) Left 01/31/2020    LITHOTRIPSY;  Surgeon: Lalo Camejo    EYE SURGERY Bilateral     iol    HERNIA REPAIR      KIDNEY STONE SURGERY      OTHER SURGICAL HISTORY  01/08/2015    Pulse O2- Abnormal     Current Outpatient Medications   Medication Sig Dispense Refill    Acetaminophen (TYLENOL PO) Take 1 tablet by mouth Daily As Needed.      GARLIC PO Take 600 mg by mouth 2 (Two) Times a Day.      nitroglycerin (NITROSTAT) 0.4 MG SL tablet 1 under the tongue as needed for angina, may repeat q5mins for up three doses 100 tablet 11    aspirin 81 MG EC tablet Take 1 tablet by mouth Daily.      rosuvastatin (CRESTOR) 10 MG tablet Take 1 tablet by mouth Daily. 90 tablet 3     No current facility-administered medications for this visit.     Reglan [metoclopramide]    Past Medical History:   Diagnosis Date    Anemia      Arthritis     Blood clot in abdominal vein     BPH (benign prostatic hypertrophy)     CHF (congestive heart failure)     Chronic kidney disease     Colon polyp     Depression     Diabetes     Disease of thyroid gland     Diverticulitis     Elevated PSA     GERD (gastroesophageal reflux disease)     GERD (gastroesophageal reflux disease)     H/O blood clots     History of transfusion     Hyperlipidemia     Hypertension     MI (myocardial infarction)     Migraines      Social History     Socioeconomic History    Marital status:    Tobacco Use    Smoking status: Former     Packs/day: 1.00     Years: 50.00     Pack years: 50.00     Types: Pipe, Cigarettes     Quit date: 2008     Years since quitting: 15.5    Smokeless tobacco: Never   Vaping Use    Vaping Use: Never used   Substance and Sexual Activity    Alcohol use: Not Currently     Comment: Quit in 1982    Drug use: Not Currently     Types: Marijuana     Comment: Quit in 1991    Sexual activity: Defer     Family History   Problem Relation Age of Onset    Heart disease Mother     Heart failure Mother     Heart attack Father     Heart disease Father     Stroke Brother     Aneurysm Brother     Heart disease Brother      Review of Systems   Constitutional: Negative for decreased appetite and malaise/fatigue.   HENT: Negative.     Eyes:  Negative for blurred vision.   Cardiovascular:  Negative for chest pain, dyspnea on exertion, leg swelling, palpitations and syncope.   Respiratory:  Positive for sleep disturbances due to breathing. Negative for shortness of breath.    Endocrine: Negative.    Hematologic/Lymphatic: Negative for bleeding problem. Does not bruise/bleed easily.   Skin: Negative.    Musculoskeletal:  Negative for falls and myalgias.   Gastrointestinal:  Negative for abdominal pain, heartburn and melena.   Genitourinary:  Negative for hematuria.   Neurological:  Positive for dizziness, headaches and light-headedness.   Psychiatric/Behavioral:   "Negative for altered mental status.    Allergic/Immunologic: Negative.       Objective     /60 (BP Location: Right arm)   Pulse 68   Ht 172.7 cm (67.99\")   Wt 73.3 kg (161 lb 9.6 oz)   BMI 24.58 kg/m²     Vitals and nursing note reviewed.   Constitutional:       General: Not in acute distress.     Appearance: Well-developed. Not diaphoretic.   Eyes:      Pupils: Pupils are equal, round, and reactive to light.   HENT:      Head: Normocephalic.   Pulmonary:      Effort: Pulmonary effort is normal. No respiratory distress.      Breath sounds: Normal breath sounds.   Cardiovascular:      Normal rate. Regular rhythm.   Pulses:     Intact distal pulses.   Edema:     Peripheral edema absent.   Abdominal:      General: Bowel sounds are normal.      Palpations: Abdomen is soft.   Musculoskeletal: Normal range of motion.      Cervical back: Normal range of motion. Skin:     General: Skin is warm and dry.   Neurological:      Mental Status: Alert and oriented to person, place, and time.        Procedures          Problem List Items Addressed This Visit          Cardiac and Vasculature    Primary hypertension - Primary    Relevant Orders    Adult Transthoracic Echo Complete W/ Cont if Necessary Per Protocol    Holter Monitor - 72 Hour Up To 15 Days    Overnight Sleep Oximetry Study    US Carotid Bilateral       Pulmonary and Pneumonias    Shortness of breath    Relevant Orders    Adult Transthoracic Echo Complete W/ Cont if Necessary Per Protocol    Holter Monitor - 72 Hour Up To 15 Days    Overnight Sleep Oximetry Study       Symptoms and Signs    Dizziness    Relevant Orders    US Carotid Bilateral     Other Visit Diagnoses       TIA (transient ischemic attack)        Relevant Orders    Adult Transthoracic Echo Complete W/ Cont if Necessary Per Protocol    Holter Monitor - 72 Hour Up To 15 Days    Overnight Sleep Oximetry Study    US Carotid Bilateral    Palpitations        Relevant Orders    Adult Transthoracic " Echo Complete W/ Cont if Necessary Per Protocol    Holter Monitor - 72 Hour Up To 15 Days    Overnight Sleep Oximetry Study    Hyperlipidemia LDL goal <100        Relevant Medications    rosuvastatin (CRESTOR) 10 MG tablet    Other Relevant Orders    US Carotid Bilateral           Questionable TIA- MRI reviewed with him and his wife, advanced microvascular ischemia, evidence of old stroke. Recommend further work up including carotid US, echocardiogram with bubble study to rule out shunt, repeat holter monitor x 5 days to rule out PAF. Advised to start statin as prescribed. Start aspirin 81 mg daily. Will monitor CBC.     HTN- blood pressure stable without medication.     Possible apneic event- overnight pulse oximetry to rule out LUIS or central apnea.     Hyperlipidemia followed by Dr. Guerrero, no lipid for review. Advised to start rosuvastatin.     Stress test 5/2022 reviewed, questionable small apical ischemia.  No anginal symptoms. Will monitor, if he develops cardiac symptoms, will consider cath.     Microvascular brain ischemia- recommend aspirin, statin, exercise.     BMI is >= 25 and <30. (Overweight) The following options were offered after discussion;: nutrition counseling/recommendations               Electronically signed by CRISTOPHER Babin,  July 25, 2023 10:36 EDT

## 2023-08-14 ENCOUNTER — HOSPITAL ENCOUNTER (OUTPATIENT)
Dept: CARDIOLOGY | Facility: HOSPITAL | Age: 73
Discharge: HOME | End: 2023-08-14
Payer: MEDICARE

## 2023-08-14 ENCOUNTER — HOSPITAL ENCOUNTER (OUTPATIENT)
Dept: CARDIOLOGY | Facility: HOSPITAL | Age: 73
Discharge: HOME | End: 2023-08-14
Admitting: NURSE PRACTITIONER
Payer: MEDICARE

## 2023-08-14 VITALS — WEIGHT: 161.6 LBS | HEIGHT: 68 IN | BODY MASS INDEX: 24.49 KG/M2

## 2023-08-14 DIAGNOSIS — I10 PRIMARY HYPERTENSION: ICD-10-CM

## 2023-08-14 DIAGNOSIS — R00.2 PALPITATIONS: ICD-10-CM

## 2023-08-14 DIAGNOSIS — E78.5 HYPERLIPIDEMIA LDL GOAL <100: ICD-10-CM

## 2023-08-14 DIAGNOSIS — G45.9 TIA (TRANSIENT ISCHEMIC ATTACK): ICD-10-CM

## 2023-08-14 DIAGNOSIS — R42 DIZZINESS: ICD-10-CM

## 2023-08-14 DIAGNOSIS — R06.02 SHORTNESS OF BREATH: ICD-10-CM

## 2023-08-14 LAB
AORTIC DIMENSIONLESS INDEX: 0.71 (DI)
BH CV ECHO MEAS - ACS: 1.23 CM
BH CV ECHO MEAS - AO MAX PG: 5.4 MMHG
BH CV ECHO MEAS - AO MEAN PG: 3 MMHG
BH CV ECHO MEAS - AO ROOT DIAM: 2.5 CM
BH CV ECHO MEAS - AO V2 MAX: 116 CM/SEC
BH CV ECHO MEAS - AO V2 VTI: 28.6 CM
BH CV ECHO MEAS - AVA(I,D): 1.92 CM2
BH CV ECHO MEAS - EDV(CUBED): 112.7 ML
BH CV ECHO MEAS - EF(MOD-BP): 58 %
BH CV ECHO MEAS - EF_3D-VOL: 57 %
BH CV ECHO MEAS - ESV(CUBED): 37.6 ML
BH CV ECHO MEAS - FS: 30.6 %
BH CV ECHO MEAS - IVS/LVPW: 0.76 CM
BH CV ECHO MEAS - IVSD: 0.82 CM
BH CV ECHO MEAS - LA DIMENSION: 3.8 CM
BH CV ECHO MEAS - LAT PEAK E' VEL: 6.1 CM/SEC
BH CV ECHO MEAS - LV MASS(C)D: 159 GRAMS
BH CV ECHO MEAS - LV MAX PG: 2.7 MMHG
BH CV ECHO MEAS - LV MEAN PG: 1 MMHG
BH CV ECHO MEAS - LV V1 MAX: 82.9 CM/SEC
BH CV ECHO MEAS - LV V1 VTI: 18.3 CM
BH CV ECHO MEAS - LVIDD: 4.8 CM
BH CV ECHO MEAS - LVIDS: 3.4 CM
BH CV ECHO MEAS - LVOT AREA: 3 CM2
BH CV ECHO MEAS - LVOT DIAM: 1.96 CM
BH CV ECHO MEAS - LVPWD: 1.07 CM
BH CV ECHO MEAS - MED PEAK E' VEL: 6.9 CM/SEC
BH CV ECHO MEAS - MV A MAX VEL: 71 CM/SEC
BH CV ECHO MEAS - MV DEC SLOPE: 334.4 CM/SEC2
BH CV ECHO MEAS - MV DEC TIME: 0.19 MSEC
BH CV ECHO MEAS - MV E MAX VEL: 90.6 CM/SEC
BH CV ECHO MEAS - MV E/A: 1.28
BH CV ECHO MEAS - MV MAX PG: 3.7 MMHG
BH CV ECHO MEAS - MV MEAN PG: 1.53 MMHG
BH CV ECHO MEAS - MV P1/2T: 93.9 MSEC
BH CV ECHO MEAS - MV V2 VTI: 37.8 CM
BH CV ECHO MEAS - MVA(P1/2T): 2.34 CM2
BH CV ECHO MEAS - MVA(VTI): 1.45 CM2
BH CV ECHO MEAS - PA V2 MAX: 99.5 CM/SEC
BH CV ECHO MEAS - RAP SYSTOLE: 8 MMHG
BH CV ECHO MEAS - RV MAX PG: 2.14 MMHG
BH CV ECHO MEAS - RV V1 MAX: 73.2 CM/SEC
BH CV ECHO MEAS - RV V1 VTI: 19.9 CM
BH CV ECHO MEAS - RVSP: 37.1 MMHG
BH CV ECHO MEAS - SV(LVOT): 55 ML
BH CV ECHO MEAS - TAPSE (>1.6): 2.01 CM
BH CV ECHO MEAS - TR MAX PG: 29.1 MMHG
BH CV ECHO MEAS - TR MAX VEL: 269.5 CM/SEC
BH CV ECHO MEASUREMENTS AVERAGE E/E' RATIO: 13.94
BH CV XLRA - TDI S': 11.6 CM/SEC
SINUS: 2.9 CM

## 2023-08-14 PROCEDURE — 93880 EXTRACRANIAL BILAT STUDY: CPT

## 2023-08-14 PROCEDURE — 93306 TTE W/DOPPLER COMPLETE: CPT

## 2023-08-14 PROCEDURE — 93306 TTE W/DOPPLER COMPLETE: CPT | Performed by: INTERNAL MEDICINE

## 2023-08-14 RX ORDER — SODIUM CHLORIDE 9 MG/ML
8 INJECTION INTRAMUSCULAR; INTRAVENOUS; SUBCUTANEOUS AS NEEDED
Status: DISCONTINUED | OUTPATIENT
Start: 2023-08-14 | End: 2023-08-15 | Stop reason: HOSPADM

## 2023-08-14 RX ADMIN — SODIUM CHLORIDE 8 ML: 9 INJECTION, SOLUTION INTRAMUSCULAR; INTRAVENOUS; SUBCUTANEOUS at 09:31

## 2024-02-05 ENCOUNTER — OFFICE VISIT (OUTPATIENT)
Dept: CARDIOLOGY | Facility: CLINIC | Age: 74
End: 2024-02-05
Payer: MEDICARE

## 2024-02-05 VITALS
WEIGHT: 168.2 LBS | BODY MASS INDEX: 25.49 KG/M2 | HEART RATE: 56 BPM | SYSTOLIC BLOOD PRESSURE: 138 MMHG | HEIGHT: 68 IN | DIASTOLIC BLOOD PRESSURE: 70 MMHG

## 2024-02-05 DIAGNOSIS — I63.89 CEREBROVASCULAR ACCIDENT (CVA) DUE TO OTHER MECHANISM: ICD-10-CM

## 2024-02-05 DIAGNOSIS — E78.5 HYPERLIPIDEMIA LDL GOAL <100: Primary | ICD-10-CM

## 2024-02-05 DIAGNOSIS — Z78.9 STATIN INTOLERANCE: ICD-10-CM

## 2024-02-05 DIAGNOSIS — I63.9 CEREBELLAR INFARCT: ICD-10-CM

## 2024-02-05 DIAGNOSIS — I10 PRIMARY HYPERTENSION: ICD-10-CM

## 2024-02-05 PROCEDURE — 99214 OFFICE O/P EST MOD 30 MIN: CPT | Performed by: NURSE PRACTITIONER

## 2024-02-05 PROCEDURE — 1160F RVW MEDS BY RX/DR IN RCRD: CPT | Performed by: NURSE PRACTITIONER

## 2024-02-05 PROCEDURE — 1159F MED LIST DOCD IN RCRD: CPT | Performed by: NURSE PRACTITIONER

## 2024-02-05 PROCEDURE — 3075F SYST BP GE 130 - 139MM HG: CPT | Performed by: NURSE PRACTITIONER

## 2024-02-05 PROCEDURE — 3078F DIAST BP <80 MM HG: CPT | Performed by: NURSE PRACTITIONER

## 2024-02-05 RX ORDER — LORATADINE 10 MG/1
10 TABLET ORAL DAILY
COMMUNITY

## 2024-02-05 RX ORDER — OMEPRAZOLE 40 MG/1
40 CAPSULE, DELAYED RELEASE ORAL DAILY PRN
COMMUNITY

## 2024-02-05 NOTE — PROGRESS NOTES
"Chief Complaint   Patient presents with    Follow-up     Cardiac management    Lab     Last labs in November per PCP. A1C 8.7 at last labs.    Headache     Has noticed BP being more elevated with headaches. Has been sleeping more.    Medication     Was unable to tolerate Crestor, had more headaches after taking so he stopped taking.    Med Refill     PCP refills medications.     Subjective       Cecil Gomez is a 74 y.o. male with chronic, mild anemia, hyperlipidemia, normal cardiac cath in 2012 referred back to our office in May 2022 secondary to hypertensive event with chest pain.  Repeat cardiac work-up showed normal EF, Max /72, questionable small apical ischemia.  Amlodipine 5 mg added.  Holter showed brief SVT, no AF. We did not see him after that.      He returned 7/2023 with questionable TIA in April 2023. His wife observed him waking early am with confusion, speaking to dead relatives, raising arms in the air. Presented to [facility]. CT head advanced microangiopathy, remote bilateral cerebellar infarcts, nothing acute. H/H 12.5/38.9, normal CMP, normal UA, positive strep pharyngitis, treated with PCN injection. MRI 7/2323 showed no acute stroke, chronic bilateral cerebellar infarct, advanced chronic small vessel ischemic disease.  No acute changes. Advised to be on aspirin statin. Echo with bubble negative, carotid US showed no sig stenosis and holter showed no AF. Overnight showed no sig hypoxia.     He returns today for follow up with his wife. He denies CP, SOB, no recurrent TIA. He did not tolerate Crestor due to headache. He has tried \"every statin\" and does not tolerate. Lipids followed by PCP. In 2020, . He is willing to try PCSK9. Also, did not start amlodipine. A1C 8.7%.        Cardiac History:    Past Surgical History:   Procedure Laterality Date    CARDIAC CATHETERIZATION  01/31/2012    Normal Coronaries. EF 55%. few PVC    CARDIAC CATHETERIZATION  06/02/2005    30% LAD. Myocardial " Bridging    CARDIAC CATHETERIZATION  12/07/2008    Normal Coronaries    CARDIOVASCULAR STRESS TEST  09/14/2015    5 Min.31 Secs. 7.0 METS. 99% THR. BP- 136/70. EF 58%. Negative.    CARDIOVASCULAR STRESS TEST  06/21/2022    Lexiscan- EF 57%. BP- 165/72. R/O Apical Ischemia    CIRCUMCISION N/A 01/31/2020    Procedure: CIRCUMCISION;  Surgeon: Lalo Camejo MD;  Location: [facility];  Service: Urology    COLONOSCOPY N/A 08/25/2021    COLONOSCOPY- Melvina Martinez MD    CONVERTED (HISTORICAL) HOLTER  06/08/2013    Avg 73. . Rare PAC. 3% Artifacts    CONVERTED (HISTORICAL) HOLTER  06/22/2022    7 Days. Avg 66. . 5 SVT    CONVERTED (HISTORICAL) HOLTER  08/10/2023    5 Days. Avg 64. . Rare PAC & PVC    ECHO - CONVERTED  09/14/2015    EF 60%. Trace MR. RVSP- 35 mmHg    ECHO - CONVERTED  06/21/2022    EF 55%. LA- 4.1. Mild MR & AI. RVSP- 47 mmHg    ECHO - CONVERTED  08/14/2023    EF 60%. LA- 3.8. Mild MR. RVSP- 38 mmHg. No shunt    ENDOSCOPY N/A 08/25/2021    EGD- Melvina Martinez    EXTRACORPOREAL SHOCK WAVE LITHOTRIPSY (ESWL) Left 01/31/2020    LITHOTRIPSY;  Surgeon: Lalo Camejo    OTHER SURGICAL HISTORY  01/08/2015    Pulse O2- Abnormal     Current Outpatient Medications   Medication Sig Dispense Refill    Acetaminophen (TYLENOL PO) Take 500 mg by mouth Daily As Needed.      GARLIC PO Take 600 mg by mouth 2 (Two) Times a Day.      loratadine (CLARITIN) 10 MG tablet Take 1 tablet by mouth Daily.      nitroglycerin (NITROSTAT) 0.4 MG SL tablet 1 under the tongue as needed for angina, may repeat q5mins for up three doses 100 tablet 11    omeprazole (priLOSEC) 40 MG capsule Take 1 capsule by mouth Daily As Needed.      aspirin 81 MG EC tablet Take 1 tablet by mouth Daily.       No current facility-administered medications for this visit.     Other and Reglan [metoclopramide]    Past Medical History:   Diagnosis Date    Anemia     Arthritis     Blood clot in abdominal vein      BPH (benign prostatic hypertrophy)     Cerebellar infarct 2024    CHF (congestive heart failure)     Chronic kidney disease     Colon polyp     Depression     Diabetes     Disease of thyroid gland     Diverticulitis     Elevated PSA     GERD (gastroesophageal reflux disease)     GERD (gastroesophageal reflux disease)     H/O blood clots     History of transfusion     Hyperlipidemia     Hypertension     MI (myocardial infarction)     Migraines      Social History     Socioeconomic History    Marital status:    Tobacco Use    Smoking status: Former     Packs/day: 1.00     Years: 50.00     Additional pack years: 0.00     Total pack years: 50.00     Types: Pipe, Cigarettes     Quit date:      Years since quittin.1    Smokeless tobacco: Never   Vaping Use    Vaping Use: Never used   Substance and Sexual Activity    Alcohol use: Not Currently     Comment: Quit in     Drug use: Not Currently     Types: Marijuana     Comment: Quit in     Sexual activity: Defer     Family History   Problem Relation Age of Onset    Heart disease Mother     Heart failure Mother     Heart attack Father     Heart disease Father     Stroke Brother     Aneurysm Brother     Heart disease Brother      Review of Systems   Constitutional: Positive for weight gain. Negative for decreased appetite and malaise/fatigue.   HENT: Negative.     Eyes:  Negative for blurred vision.   Cardiovascular:  Negative for chest pain, dyspnea on exertion, leg swelling, palpitations and syncope.   Respiratory:  Negative for shortness of breath and sleep disturbances due to breathing.    Endocrine: Negative.    Hematologic/Lymphatic: Negative for bleeding problem. Does not bruise/bleed easily.   Skin: Negative.    Musculoskeletal:  Negative for falls and myalgias.   Gastrointestinal:  Negative for abdominal pain, heartburn and melena.   Genitourinary:  Negative for hematuria.   Neurological:  Negative for dizziness and light-headedness.  "  Psychiatric/Behavioral:  Negative for altered mental status.    Allergic/Immunologic: Negative.       Objective     /70 (BP Location: Left arm)   Pulse 56   Ht 172.7 cm (67.99\")   Wt 76.3 kg (168 lb 3.2 oz)   BMI 25.58 kg/m²     Vitals and nursing note reviewed.   Constitutional:       General: Not in acute distress.     Appearance: Well-developed. Not diaphoretic.   Eyes:      Pupils: Pupils are equal, round, and reactive to light.   HENT:      Head: Normocephalic.   Pulmonary:      Effort: Pulmonary effort is normal. No respiratory distress.      Breath sounds: Normal breath sounds.   Cardiovascular:      Normal rate. Regular rhythm.   Pulses:     Intact distal pulses.   Edema:     Peripheral edema absent.   Abdominal:      General: Bowel sounds are normal.      Palpations: Abdomen is soft.   Musculoskeletal: Normal range of motion.      Cervical back: Normal range of motion. Skin:     General: Skin is warm and dry.   Neurological:      Mental Status: Alert and oriented to person, place, and time.        Procedures          Problem List Items Addressed This Visit          Cardiac and Vasculature    Primary hypertension       Neuro    Cerebellar infarct    Overview     Bilateral, remote           Other Visit Diagnoses       Hyperlipidemia LDL goal <100    -  Primary    Relevant Orders    Ambulatory Referral to Disease State Management    Cerebrovascular accident (CVA) due to other mechanism        Relevant Orders    Ambulatory Referral to Disease State Management    Statin intolerance        Relevant Orders    Ambulatory Referral to Disease State Management           TIA/old CVA  -MRI showed microvascular angina, old bilat cerebellar infarct   -advised on aspirin, cholesterol management  -he agrees to try Leqvio, Repatha if Leqvio not covered   -refer to lipid clinic     HTN  -bp remains WNL without medication  -he declined amlodipine  -consider ACE or ARB for renovascular protection in diabetic "     Hyperlipidemia  -statin intolerant, refer for Leqvio  -Mediterranean diet    Diabetes  -no labs available, pt wife reports A1C 8.7%.   -low CHO, low sugar diet     Echo, holter, carotid US reviewed with him and his wife. Referred to Lipid Clinic at  for Leqvio.     FU 6 months or sooner if needed.               Electronically signed by CRISTOPHER Babin,  February 5, 2024 10:53 EST

## 2024-03-04 ENCOUNTER — TELEPHONE (OUTPATIENT)
Dept: PHARMACY | Facility: HOSPITAL | Age: 74
End: 2024-03-04
Payer: MEDICARE

## 2024-08-06 ENCOUNTER — OFFICE VISIT (OUTPATIENT)
Dept: CARDIOLOGY | Facility: CLINIC | Age: 74
End: 2024-08-06
Payer: MEDICARE

## 2024-08-06 VITALS
WEIGHT: 156.8 LBS | SYSTOLIC BLOOD PRESSURE: 120 MMHG | BODY MASS INDEX: 23.76 KG/M2 | HEART RATE: 60 BPM | HEIGHT: 68 IN | DIASTOLIC BLOOD PRESSURE: 60 MMHG

## 2024-08-06 DIAGNOSIS — R07.2 PRECORDIAL PAIN: ICD-10-CM

## 2024-08-06 DIAGNOSIS — E78.00 HYPERCHOLESTEROLEMIA: ICD-10-CM

## 2024-08-06 DIAGNOSIS — I10 PRIMARY HYPERTENSION: Primary | ICD-10-CM

## 2024-08-06 DIAGNOSIS — I63.9 CEREBELLAR INFARCT: ICD-10-CM

## 2024-08-06 RX ORDER — NITROGLYCERIN 0.4 MG/1
TABLET SUBLINGUAL
Qty: 25 TABLET | Refills: 1 | Status: SHIPPED | OUTPATIENT
Start: 2024-08-06

## 2024-08-06 NOTE — PROGRESS NOTES
Chief Complaint   Patient presents with    Follow-up     Cardiac management    Lab     Last labs per PCP in May.     Blood pressure     He is monitoring at home, doing better. He has been concerned about some friends health and well being.     Med Refill     Will need refills on Nitro sl-30 day.     Subjective       Cecil Gomez is a 74 y.o. male with chronic, mild anemia, hyperlipidemia, normal cardiac cath in 2012 referred back to our office in May 2022 secondary to hypertensive event with chest pain.  Repeat cardiac work-up showed normal EF, Max /72, questionable small apical ischemia.  Amlodipine 5 mg added.  Holter showed brief SVT, no AF. We did not see him after that.      He returned 7/2023 with questionable TIA in April 2023. Work up at [facility] showed CT head advanced microangiopathy, remote bilateral cerebellar infarcts, nothing acute. H/H 12.5/38.9, normal CMP, normal UA, positive strep pharyngitis, treated with PCN injection. MRI 7/2323 showed no acute stroke, chronic bilateral cerebellar infarct, advanced chronic small vessel ischemic disease.  No acute changes. Advised to be on aspirin, statin. Echo with bubble negative, carotid US showed no sig stenosis and holter showed no AF. Overnight showed no sig hypoxia.     He presents today for follow up. No chest pain, dyspnea, palpitations, no recurrent TIA. He declined statin, PCSK9, antihypertensives. He is monitoring blood pressure at home. Mostly well controlled unless he gets upset about something. He and his wife are helping a friend who is homeless and he worries about him. Labs followed by Dr. Guerrero. Pt reports A1C 8.7%. 11/2023, , A1C 8.2%.          Cardiac History:    Past Surgical History:   Procedure Laterality Date    CARDIAC CATHETERIZATION  01/31/2012    Normal Coronaries. EF 55%. few PVC    CARDIAC CATHETERIZATION  06/02/2005    30% LAD. Myocardial Bridging    CARDIAC CATHETERIZATION  12/07/2008    Normal Coronaries     CARDIOVASCULAR STRESS TEST  09/14/2015    5 Min.31 Secs. 7.0 METS. 99% THR. BP- 136/70. EF 58%. Negative.    CARDIOVASCULAR STRESS TEST  06/21/2022    Lexiscan- EF 57%. BP- 165/72. R/O Apical Ischemia    CIRCUMCISION N/A 01/31/2020    Procedure: CIRCUMCISION;  Surgeon: Lalo Camejo MD;  Location: [facility];  Service: Urology    COLONOSCOPY N/A 08/25/2021    COLONOSCOPY- Melvina Martinez MD    CONVERTED (HISTORICAL) HOLTER  06/08/2013    Avg 73. . Rare PAC. 3% Artifacts    CONVERTED (HISTORICAL) HOLTER  06/22/2022    7 Days. Avg 66. . 5 SVT    CONVERTED (HISTORICAL) HOLTER  08/10/2023    5 Days. Avg 64. . Rare PAC & PVC    ECHO - CONVERTED  09/14/2015    EF 60%. Trace MR. RVSP- 35 mmHg    ECHO - CONVERTED  06/21/2022    EF 55%. LA- 4.1. Mild MR & AI. RVSP- 47 mmHg    ECHO - CONVERTED  08/14/2023    EF 60%. LA- 3.8. Mild MR. RVSP- 38 mmHg. No shunt    ENDOSCOPY N/A 08/25/2021    EGD- Melvina Martinez    EXTRACORPOREAL SHOCK WAVE LITHOTRIPSY (ESWL) Left 01/31/2020    LITHOTRIPSY;  Surgeon: Lalo Camejo    OTHER SURGICAL HISTORY  01/08/2015    Pulse O2- Abnormal     Current Outpatient Medications   Medication Sig Dispense Refill    Acetaminophen (TYLENOL PO) Take 500 mg by mouth Daily As Needed.      aspirin 81 MG EC tablet Take 1 tablet by mouth Daily.      Multiple Vitamins-Minerals (MENS 50+ MULTIVITAMIN PO) Take  by mouth Daily.      nitroglycerin (NITROSTAT) 0.4 MG SL tablet 1 under the tongue as needed for angina, may repeat q5mins for up three doses 25 tablet 1     No current facility-administered medications for this visit.     Other and Reglan [metoclopramide]    Past Medical History:   Diagnosis Date    Anemia     Arthritis     Blood clot in abdominal vein     BPH (benign prostatic hypertrophy)     Cerebellar infarct 2/5/2024    CHF (congestive heart failure)     Chronic kidney disease     Colon polyp     Depression     Diabetes     Disease of thyroid gland      Diverticulitis     Elevated PSA     GERD (gastroesophageal reflux disease)     GERD (gastroesophageal reflux disease)     H/O blood clots     History of transfusion     Hypercholesterolemia 2024    Hyperlipidemia     Hypertension     MI (myocardial infarction)     Migraines      Social History     Socioeconomic History    Marital status:    Tobacco Use    Smoking status: Former     Current packs/day: 0.00     Average packs/day: 1 pack/day for 50.0 years (50.0 ttl pk-yrs)     Types: Pipe, Cigarettes     Start date:      Quit date:      Years since quittin.6     Passive exposure: Past    Smokeless tobacco: Never   Vaping Use    Vaping status: Never Used   Substance and Sexual Activity    Alcohol use: Not Currently     Comment: Quit in     Drug use: Not Currently     Types: Marijuana     Comment: Quit in     Sexual activity: Defer     Family History   Problem Relation Age of Onset    Heart disease Mother     Heart failure Mother     Heart attack Father     Heart disease Father     Stroke Brother     Aneurysm Brother     Heart disease Brother      Review of Systems   Constitutional: Negative for decreased appetite and malaise/fatigue.   HENT: Negative.     Eyes:  Negative for blurred vision.   Cardiovascular:  Negative for chest pain, dyspnea on exertion, leg swelling, palpitations and syncope.   Respiratory:  Negative for shortness of breath and sleep disturbances due to breathing.    Endocrine: Negative.    Hematologic/Lymphatic: Negative for bleeding problem. Does not bruise/bleed easily.   Skin: Negative.    Musculoskeletal:  Negative for falls and myalgias.   Gastrointestinal:  Negative for abdominal pain, heartburn and melena.   Genitourinary:  Negative for hematuria.   Neurological:  Negative for dizziness and light-headedness.   Psychiatric/Behavioral:  Negative for altered mental status.    Allergic/Immunologic: Negative.       Objective     /60 (BP Location: Left arm,  "Patient Position: Sitting)   Pulse 60   Ht 172.7 cm (67.99\")   Wt 71.1 kg (156 lb 12.8 oz)   BMI 23.85 kg/m²     Vitals and nursing note reviewed.   Constitutional:       General: Not in acute distress.     Appearance: Well-developed. Not diaphoretic.   Eyes:      Pupils: Pupils are equal, round, and reactive to light.   HENT:      Head: Normocephalic.   Pulmonary:      Effort: Pulmonary effort is normal. No respiratory distress.      Breath sounds: Normal breath sounds.   Cardiovascular:      Normal rate. Regular rhythm.   Pulses:     Intact distal pulses.   Edema:     Peripheral edema absent.   Abdominal:      General: Bowel sounds are normal.      Palpations: Abdomen is soft.   Musculoskeletal: Normal range of motion.      Cervical back: Normal range of motion. Skin:     General: Skin is warm and dry.   Neurological:      Mental Status: Alert and oriented to person, place, and time.        Procedures          Problem List Items Addressed This Visit          Cardiac and Vasculature    Primary hypertension - Primary    Precordial pain    Relevant Medications    nitroglycerin (NITROSTAT) 0.4 MG SL tablet    Hypercholesterolemia       Neuro    Cerebellar infarct    Overview     Bilateral, remote            TIA/old CVA  -MRI showed microvascular angina, old bilat cerebellar infarct   -advised on aspirin, cholesterol management  -he was referred to lipid clinic for Leqvio, bu changed his mind, declines treatment.      HTN  -bp remains WNL without medication  -he declined amlodipine  -consider ACE or ARB for renovascular protection in diabetic      Hyperlipidemia  -statin intolerant, referred for Leqvio but patient declined  -Mediterranean diet     Diabetes  -A1C elevated    -low CHO, low sugar diet       FU one year, sooner if needed.     BMI is within normal parameters. No other follow-up for BMI required.               Electronically signed by CRISTOPHER Babin,  August 9, 2024 10:20 EDT  "

## 2024-08-09 PROBLEM — E78.00 HYPERCHOLESTEROLEMIA: Status: ACTIVE | Noted: 2024-08-09

## 2025-04-22 PROCEDURE — 36415 COLL VENOUS BLD VENIPUNCTURE: CPT

## 2025-04-22 PROCEDURE — 99285 EMERGENCY DEPT VISIT HI MDM: CPT

## 2025-04-22 RX ORDER — SODIUM CHLORIDE 0.9 % (FLUSH) 0.9 %
10 SYRINGE (ML) INJECTION AS NEEDED
Status: DISCONTINUED | OUTPATIENT
Start: 2025-04-22 | End: 2025-04-23 | Stop reason: HOSPADM

## 2025-04-23 ENCOUNTER — HOSPITAL ENCOUNTER (EMERGENCY)
Facility: HOSPITAL | Age: 75
Discharge: HOME | End: 2025-04-23
Payer: MEDICARE

## 2025-04-23 ENCOUNTER — APPOINTMENT (OUTPATIENT)
Dept: CT IMAGING | Facility: HOSPITAL | Age: 75
End: 2025-04-23
Payer: MEDICARE

## 2025-04-23 VITALS
RESPIRATION RATE: 17 BRPM | HEART RATE: 56 BPM | WEIGHT: 165 LBS | SYSTOLIC BLOOD PRESSURE: 128 MMHG | OXYGEN SATURATION: 100 % | TEMPERATURE: 98.5 F | BODY MASS INDEX: 25.9 KG/M2 | DIASTOLIC BLOOD PRESSURE: 60 MMHG | HEIGHT: 67 IN

## 2025-04-23 DIAGNOSIS — K43.9 VENTRAL HERNIA WITHOUT OBSTRUCTION OR GANGRENE: ICD-10-CM

## 2025-04-23 DIAGNOSIS — R10.84 GENERALIZED ABDOMINAL PAIN: Primary | ICD-10-CM

## 2025-04-23 DIAGNOSIS — K59.00 CONSTIPATION, UNSPECIFIED CONSTIPATION TYPE: ICD-10-CM

## 2025-04-23 LAB
ALBUMIN SERPL-MCNC: 4.3 G/DL (ref 3.5–5.2)
ALBUMIN/GLOB SERPL: 1 G/DL
ALP SERPL-CCNC: 95 U/L (ref 39–117)
ALT SERPL W P-5'-P-CCNC: 20 U/L (ref 1–41)
ANION GAP SERPL CALCULATED.3IONS-SCNC: 9.2 MMOL/L (ref 5–15)
AST SERPL-CCNC: 21 U/L (ref 1–40)
BASOPHILS # BLD AUTO: 0.02 10*3/MM3 (ref 0–0.2)
BASOPHILS NFR BLD AUTO: 0.3 % (ref 0–1.5)
BILIRUB SERPL-MCNC: 0.5 MG/DL (ref 0–1.2)
BUN SERPL-MCNC: 11 MG/DL (ref 8–23)
BUN/CREAT SERPL: 11.1 (ref 7–25)
CALCIUM SPEC-SCNC: 9.8 MG/DL (ref 8.6–10.5)
CHLORIDE SERPL-SCNC: 101 MMOL/L (ref 98–107)
CO2 SERPL-SCNC: 28.8 MMOL/L (ref 22–29)
CREAT SERPL-MCNC: 0.99 MG/DL (ref 0.76–1.27)
CRP SERPL-MCNC: <0.3 MG/DL (ref 0–0.5)
D-LACTATE SERPL-SCNC: 1 MMOL/L (ref 0.5–2)
DEPRECATED RDW RBC AUTO: 47.4 FL (ref 37–54)
EGFRCR SERPLBLD CKD-EPI 2021: 78.9 ML/MIN/1.73
EOSINOPHIL # BLD AUTO: 0.1 10*3/MM3 (ref 0–0.4)
EOSINOPHIL NFR BLD AUTO: 1.3 % (ref 0.3–6.2)
ERYTHROCYTE [DISTWIDTH] IN BLOOD BY AUTOMATED COUNT: 15.1 % (ref 12.3–15.4)
GEN 5 1HR TROPONIN T REFLEX: 7 NG/L
GLOBULIN UR ELPH-MCNC: 4.1 GM/DL
GLUCOSE SERPL-MCNC: 187 MG/DL (ref 65–99)
HCT VFR BLD AUTO: 40.1 % (ref 37.5–51)
HGB BLD-MCNC: 12.7 G/DL (ref 13–17.7)
HOLD SPECIMEN: NORMAL
HOLD SPECIMEN: NORMAL
IMM GRANULOCYTES # BLD AUTO: 0.02 10*3/MM3 (ref 0–0.05)
IMM GRANULOCYTES NFR BLD AUTO: 0.3 % (ref 0–0.5)
INR PPP: 0.99 (ref 0.9–1.1)
LIPASE SERPL-CCNC: 101 U/L (ref 13–60)
LYMPHOCYTES # BLD AUTO: 1.95 10*3/MM3 (ref 0.7–3.1)
LYMPHOCYTES NFR BLD AUTO: 25.3 % (ref 19.6–45.3)
MCH RBC QN AUTO: 27.2 PG (ref 26.6–33)
MCHC RBC AUTO-ENTMCNC: 31.7 G/DL (ref 31.5–35.7)
MCV RBC AUTO: 85.9 FL (ref 79–97)
MONOCYTES # BLD AUTO: 0.6 10*3/MM3 (ref 0.1–0.9)
MONOCYTES NFR BLD AUTO: 7.8 % (ref 5–12)
NEUTROPHILS NFR BLD AUTO: 5.02 10*3/MM3 (ref 1.7–7)
NEUTROPHILS NFR BLD AUTO: 65 % (ref 42.7–76)
NRBC BLD AUTO-RTO: 0 /100 WBC (ref 0–0.2)
PLATELET # BLD AUTO: 196 10*3/MM3 (ref 140–450)
PMV BLD AUTO: 9.4 FL (ref 6–12)
POTASSIUM SERPL-SCNC: 4.6 MMOL/L (ref 3.5–5.2)
PROT SERPL-MCNC: 8.4 G/DL (ref 6–8.5)
PROTHROMBIN TIME: 13.2 SECONDS (ref 11.6–15.1)
QT INTERVAL: 396 MS
QTC INTERVAL: 456 MS
RBC # BLD AUTO: 4.67 10*6/MM3 (ref 4.14–5.8)
SODIUM SERPL-SCNC: 139 MMOL/L (ref 136–145)
TROPONIN T NUMERIC DELTA: -1 NG/L
TROPONIN T SERPL HS-MCNC: 8 NG/L
WBC NRBC COR # BLD AUTO: 7.71 10*3/MM3 (ref 3.4–10.8)
WHOLE BLOOD HOLD COAG: NORMAL
WHOLE BLOOD HOLD SPECIMEN: NORMAL

## 2025-04-23 PROCEDURE — 25510000001 IOPAMIDOL 61 % SOLUTION

## 2025-04-23 PROCEDURE — 25010000002 ONDANSETRON PER 1 MG: Performed by: NURSE PRACTITIONER

## 2025-04-23 PROCEDURE — 96375 TX/PRO/DX INJ NEW DRUG ADDON: CPT

## 2025-04-23 PROCEDURE — 25010000002 MORPHINE PER 10 MG: Performed by: NURSE PRACTITIONER

## 2025-04-23 PROCEDURE — 83605 ASSAY OF LACTIC ACID: CPT

## 2025-04-23 PROCEDURE — 83690 ASSAY OF LIPASE: CPT

## 2025-04-23 PROCEDURE — 25810000003 SODIUM CHLORIDE 0.9 % SOLUTION: Performed by: NURSE PRACTITIONER

## 2025-04-23 PROCEDURE — 85025 COMPLETE CBC W/AUTO DIFF WBC: CPT

## 2025-04-23 PROCEDURE — 86140 C-REACTIVE PROTEIN: CPT | Performed by: NURSE PRACTITIONER

## 2025-04-23 PROCEDURE — 93005 ELECTROCARDIOGRAM TRACING: CPT | Performed by: NURSE PRACTITIONER

## 2025-04-23 PROCEDURE — 85610 PROTHROMBIN TIME: CPT

## 2025-04-23 PROCEDURE — 96374 THER/PROPH/DIAG INJ IV PUSH: CPT

## 2025-04-23 PROCEDURE — 36415 COLL VENOUS BLD VENIPUNCTURE: CPT

## 2025-04-23 PROCEDURE — 80053 COMPREHEN METABOLIC PANEL: CPT

## 2025-04-23 PROCEDURE — 74177 CT ABD & PELVIS W/CONTRAST: CPT | Performed by: RADIOLOGY

## 2025-04-23 PROCEDURE — 74177 CT ABD & PELVIS W/CONTRAST: CPT

## 2025-04-23 PROCEDURE — 84484 ASSAY OF TROPONIN QUANT: CPT | Performed by: NURSE PRACTITIONER

## 2025-04-23 RX ORDER — MAGNESIUM CARB/ALUMINUM HYDROX 105-160MG
296 TABLET,CHEWABLE ORAL ONCE
Status: COMPLETED | OUTPATIENT
Start: 2025-04-23 | End: 2025-04-23

## 2025-04-23 RX ORDER — IOPAMIDOL 612 MG/ML
100 INJECTION, SOLUTION INTRAVASCULAR
Status: COMPLETED | OUTPATIENT
Start: 2025-04-23 | End: 2025-04-23

## 2025-04-23 RX ORDER — MORPHINE SULFATE 2 MG/ML
2 INJECTION, SOLUTION INTRAMUSCULAR; INTRAVENOUS ONCE
Status: COMPLETED | OUTPATIENT
Start: 2025-04-23 | End: 2025-04-23

## 2025-04-23 RX ORDER — KETOROLAC TROMETHAMINE 30 MG/ML
60 INJECTION, SOLUTION INTRAMUSCULAR; INTRAVENOUS ONCE
Status: DISCONTINUED | OUTPATIENT
Start: 2025-04-23 | End: 2025-04-23

## 2025-04-23 RX ORDER — ONDANSETRON 2 MG/ML
4 INJECTION INTRAMUSCULAR; INTRAVENOUS ONCE
Status: COMPLETED | OUTPATIENT
Start: 2025-04-23 | End: 2025-04-23

## 2025-04-23 RX ADMIN — ONDANSETRON 4 MG: 2 INJECTION INTRAMUSCULAR; INTRAVENOUS at 03:21

## 2025-04-23 RX ADMIN — MAGNESIUM CITRATE 296 ML: 1.75 LIQUID ORAL at 04:36

## 2025-04-23 RX ADMIN — IOPAMIDOL 80 ML: 612 INJECTION, SOLUTION INTRAVENOUS at 02:32

## 2025-04-23 RX ADMIN — SODIUM CHLORIDE 1000 ML: 9 INJECTION, SOLUTION INTRAVENOUS at 01:33

## 2025-04-23 RX ADMIN — MORPHINE SULFATE 2 MG: 2 INJECTION, SOLUTION INTRAMUSCULAR; INTRAVENOUS at 03:21

## 2025-04-24 NOTE — ED PROVIDER NOTES
Subjective   History of Present Illness  Patient is a 75 year old male with PMH significant for CHF, BPH, GERD, hypertension, hyperlipidemia, migraines, and hx of MI. He presents to the ED for abdominal pain. He reports pain is located to lower abdomen and states he has had bloating. He reports hx of obstruction and blood clots in his abdomen in the past. Denies fever. Denies any other complaints.        Review of Systems   Constitutional: Negative.  Negative for fever.   HENT: Negative.     Eyes: Negative.    Respiratory: Negative.     Cardiovascular: Negative.  Negative for chest pain.   Gastrointestinal:  Positive for abdominal distention and abdominal pain.   Endocrine: Negative.    Genitourinary: Negative.  Negative for dysuria.   Musculoskeletal: Negative.    Skin: Negative.    Allergic/Immunologic: Negative.    Neurological: Negative.    Hematological: Negative.    Psychiatric/Behavioral: Negative.     All other systems reviewed and are negative.      Past Medical History:   Diagnosis Date    Anemia     Arthritis     Blood clot in abdominal vein     BPH (benign prostatic hypertrophy)     Cerebellar infarct 2/5/2024    CHF (congestive heart failure)     Chronic kidney disease     Colon polyp     Depression     Diabetes     Disease of thyroid gland     Diverticulitis     Elevated PSA     GERD (gastroesophageal reflux disease)     GERD (gastroesophageal reflux disease)     H/O blood clots     History of transfusion     Hypercholesterolemia 8/9/2024    Hyperlipidemia     Hypertension     MI (myocardial infarction)     Migraines        Allergies   Allergen Reactions    Other Headache     Statin intolerant, has tried all statins and unable to take due to causing severe headaches.    Reglan [Metoclopramide] Rash     In mouth and throat       Past Surgical History:   Procedure Laterality Date    CARDIAC CATHETERIZATION  01/31/2012    Normal Coronaries. EF 55%. few PVC    CARDIAC CATHETERIZATION  06/02/2005    30%  LAD. Myocardial Bridging    CARDIAC CATHETERIZATION  2008    Normal Coronaries    CARDIOVASCULAR STRESS TEST  2015    5 Min.31 Secs. 7.0 METS. 99% THR. BP- 136/70. EF 58%. Negative.    CARDIOVASCULAR STRESS TEST  2022    Lexiscan- EF 57%. BP- 165/72. R/O Apical Ischemia    CIRCUMCISION N/A 2020    Procedure: CIRCUMCISION;  Surgeon: Lalo Camejo MD;  Location: [facility];  Service: Urology    COLONOSCOPY N/A 2021    COLONOSCOPY- Melvina Martinez MD    CONVERTED (HISTORICAL) HOLTER  2013    Avg 73. . Rare PAC. 3% Artifacts    CONVERTED (HISTORICAL) HOLTER  2022    7 Days. Avg 66. . 5 SVT    CONVERTED (HISTORICAL) HOLTER  08/10/2023    5 Days. Avg 64. . Rare PAC & PVC    ECHO - CONVERTED  2015    EF 60%. Trace MR. RVSP- 35 mmHg    ECHO - CONVERTED  2022    EF 55%. LA- 4.1. Mild MR & AI. RVSP- 47 mmHg    ECHO - CONVERTED  2023    EF 60%. LA- 3.8. Mild MR. RVSP- 38 mmHg. No shunt    ENDOSCOPY N/A 2021    EGD- Melvina Martinez    EXTRACORPOREAL SHOCK WAVE LITHOTRIPSY (ESWL) Left 2020    LITHOTRIPSY;  Surgeon: Lalo Camejo    OTHER SURGICAL HISTORY  2015    Pulse O2- Abnormal       Family History   Problem Relation Age of Onset    Heart disease Mother     Heart failure Mother     Heart attack Father     Heart disease Father     Stroke Brother     Aneurysm Brother     Heart disease Brother        Social History     Socioeconomic History    Marital status:    Tobacco Use    Smoking status: Former     Current packs/day: 0.00     Average packs/day: 1 pack/day for 50.0 years (50.0 ttl pk-yrs)     Types: Pipe, Cigarettes     Start date:      Quit date:      Years since quittin.3     Passive exposure: Past    Smokeless tobacco: Never   Vaping Use    Vaping status: Never Used   Substance and Sexual Activity    Alcohol use: Not Currently     Comment: Quit in     Drug use: Not  Currently     Types: Marijuana     Comment: Quit in 1991    Sexual activity: Defer           Objective   Physical Exam  Vitals and nursing note reviewed.   Constitutional:       General: He is not in acute distress.     Appearance: He is well-developed. He is not diaphoretic.   HENT:      Head: Normocephalic and atraumatic.      Right Ear: External ear normal.      Left Ear: External ear normal.      Nose: Nose normal.   Eyes:      Conjunctiva/sclera: Conjunctivae normal.      Pupils: Pupils are equal, round, and reactive to light.   Neck:      Vascular: No JVD.      Trachea: No tracheal deviation.   Cardiovascular:      Rate and Rhythm: Normal rate and regular rhythm.      Heart sounds: Normal heart sounds. No murmur heard.  Pulmonary:      Effort: Pulmonary effort is normal. No respiratory distress.      Breath sounds: Normal breath sounds. No wheezing.   Abdominal:      General: Bowel sounds are normal. There is distension.      Palpations: Abdomen is soft.      Tenderness: There is no abdominal tenderness.   Musculoskeletal:         General: No deformity. Normal range of motion.      Cervical back: Normal range of motion and neck supple.   Skin:     General: Skin is warm and dry.      Capillary Refill: Capillary refill takes less than 2 seconds.      Coloration: Skin is not pale.      Findings: No erythema or rash.   Neurological:      General: No focal deficit present.      Mental Status: He is alert and oriented to person, place, and time.      Cranial Nerves: No cranial nerve deficit.   Psychiatric:         Mood and Affect: Mood normal.         Behavior: Behavior normal.         Thought Content: Thought content normal.         Procedures       Results for orders placed or performed during the hospital encounter of 04/23/25   Comprehensive Metabolic Panel    Collection Time: 04/23/25  1:29 AM    Specimen: Arm, Right; Blood   Result Value Ref Range    Glucose 187 (H) 65 - 99 mg/dL    BUN 11 8 - 23 mg/dL     Creatinine 0.99 0.76 - 1.27 mg/dL    Sodium 139 136 - 145 mmol/L    Potassium 4.6 3.5 - 5.2 mmol/L    Chloride 101 98 - 107 mmol/L    CO2 28.8 22.0 - 29.0 mmol/L    Calcium 9.8 8.6 - 10.5 mg/dL    Total Protein 8.4 6.0 - 8.5 g/dL    Albumin 4.3 3.5 - 5.2 g/dL    ALT (SGPT) 20 1 - 41 U/L    AST (SGOT) 21 1 - 40 U/L    Alkaline Phosphatase 95 39 - 117 U/L    Total Bilirubin 0.5 0.0 - 1.2 mg/dL    Globulin 4.1 gm/dL    A/G Ratio 1.0 g/dL    BUN/Creatinine Ratio 11.1 7.0 - 25.0    Anion Gap 9.2 5.0 - 15.0 mmol/L    eGFR 78.9 >60.0 mL/min/1.73   Lipase    Collection Time: 04/23/25  1:29 AM    Specimen: Arm, Right; Blood   Result Value Ref Range    Lipase 101 (H) 13 - 60 U/L   Lactic Acid, Plasma    Collection Time: 04/23/25  1:29 AM    Specimen: Arm, Right; Blood   Result Value Ref Range    Lactate 1.0 0.5 - 2.0 mmol/L   Protime-INR    Collection Time: 04/23/25  1:29 AM    Specimen: Arm, Right; Blood   Result Value Ref Range    Protime 13.2 11.6 - 15.1 Seconds    INR 0.99 0.90 - 1.10   CBC Auto Differential    Collection Time: 04/23/25  1:29 AM    Specimen: Arm, Right; Blood   Result Value Ref Range    WBC 7.71 3.40 - 10.80 10*3/mm3    RBC 4.67 4.14 - 5.80 10*6/mm3    Hemoglobin 12.7 (L) 13.0 - 17.7 g/dL    Hematocrit 40.1 37.5 - 51.0 %    MCV 85.9 79.0 - 97.0 fL    MCH 27.2 26.6 - 33.0 pg    MCHC 31.7 31.5 - 35.7 g/dL    RDW 15.1 12.3 - 15.4 %    RDW-SD 47.4 37.0 - 54.0 fl    MPV 9.4 6.0 - 12.0 fL    Platelets 196 140 - 450 10*3/mm3    Neutrophil % 65.0 42.7 - 76.0 %    Lymphocyte % 25.3 19.6 - 45.3 %    Monocyte % 7.8 5.0 - 12.0 %    Eosinophil % 1.3 0.3 - 6.2 %    Basophil % 0.3 0.0 - 1.5 %    Immature Grans % 0.3 0.0 - 0.5 %    Neutrophils, Absolute 5.02 1.70 - 7.00 10*3/mm3    Lymphocytes, Absolute 1.95 0.70 - 3.10 10*3/mm3    Monocytes, Absolute 0.60 0.10 - 0.90 10*3/mm3    Eosinophils, Absolute 0.10 0.00 - 0.40 10*3/mm3    Basophils, Absolute 0.02 0.00 - 0.20 10*3/mm3    Immature Grans, Absolute 0.02 0.00 - 0.05  10*3/mm3    nRBC 0.0 0.0 - 0.2 /100 WBC   C-reactive Protein    Collection Time: 04/23/25  1:29 AM    Specimen: Arm, Right; Blood   Result Value Ref Range    C-Reactive Protein <0.30 0.00 - 0.50 mg/dL   High Sensitivity Troponin T    Collection Time: 04/23/25  1:29 AM    Specimen: Arm, Right; Blood   Result Value Ref Range    HS Troponin T 8 <22 ng/L   Green Top (Gel)    Collection Time: 04/23/25  1:29 AM   Result Value Ref Range    Extra Tube Hold for add-ons.    Lavender Top    Collection Time: 04/23/25  1:29 AM   Result Value Ref Range    Extra Tube hold for add-on    Gold Top - SST    Collection Time: 04/23/25  1:29 AM   Result Value Ref Range    Extra Tube Hold for add-ons.    Light Blue Top    Collection Time: 04/23/25  1:29 AM   Result Value Ref Range    Extra Tube Hold for add-ons.    ECG 12 Lead QT Measurement    Collection Time: 04/23/25  3:09 AM   Result Value Ref Range    QT Interval 396 ms    QTC Interval 456 ms   High Sensitivity Troponin T 1Hr    Collection Time: 04/23/25  3:39 AM    Specimen: Arm, Left; Blood   Result Value Ref Range    HS Troponin T 7 <22 ng/L    Troponin T Numeric Delta -1 Abnormal if >/=3 ng/L      CT Abdomen Pelvis With Contrast   Final Result       Ventral hernias containing mesenteric fat and transverse colon segments   and small bowel segments without features of bowel obstruction.   Constipation involving the right colon and transverse colon segment.   Cholecystectomy.   Small exophytic cyst at the midpole the right kidney.   Slightly ectatic infrarenal abdominal aortic segment up to 2.4 cm in   diameter.   Mild sigmoid colon diverticulosis.   No free fluid or free air.    No abscess or hematoma.           This report was finalized on 4/23/2025 3:40 AM by Manan Rankin MD.               ED Course  ED Course as of 04/24/25 0230   Wed Apr 23, 2025   0712 ECG demonstrates normal sinus rhythm at rate of 80 bpm.  NE and QTc interval are normal as is QRS duration.  There are no  acute ST-T wave changes. [RA]      ED Course User Index  [RA] Manan Maldonado MD                                                       Medical Decision Making  Patient is a 75 year old male with PMH significant for CHF, BPH, GERD, hypertension, hyperlipidemia, migraines, and hx of MI. He presents to the ED for abdominal pain. He reports pain is located to lower abdomen and states he has had bloating. He reports hx of obstruction and blood clots in his abdomen in the past. Denies fever. Denies any other complaints.      Problems Addressed:  Constipation, unspecified constipation type: complicated acute illness or injury  Generalized abdominal pain: complicated acute illness or injury  Ventral hernia without obstruction or gangrene: complicated acute illness or injury    Amount and/or Complexity of Data Reviewed  Labs: ordered.  Radiology: ordered.  ECG/medicine tests: ordered.    Risk  OTC drugs.  Prescription drug management.        Final diagnoses:   Generalized abdominal pain   Constipation, unspecified constipation type   Ventral hernia without obstruction or gangrene       ED Disposition  ED Disposition       ED Disposition   Discharge    Condition   Stable    Comment   --               Tavares Guerrero MD  [address] [place] [address] [address]  [facility] [address] [phone]    Call in 2 days      Rocío Mazariegos MD  [address]  [facility] [address]  [facility] [address]  [phone]    Schedule an appointment as soon as possible for a visit            Medication List      No changes were made to your prescriptions during this visit.            Nallely Mustafa, APRN  04/24/25 [id number]

## 2025-05-30 ENCOUNTER — APPOINTMENT (OUTPATIENT)
Dept: CT IMAGING | Facility: HOSPITAL | Age: 75
End: 2025-05-30
Payer: MEDICARE

## 2025-05-30 ENCOUNTER — HOSPITAL ENCOUNTER (INPATIENT)
Facility: HOSPITAL | Age: 75
LOS: 4 days | Discharge: HOME | End: 2025-06-03
Admitting: INTERNAL MEDICINE
Payer: MEDICARE

## 2025-05-30 ENCOUNTER — APPOINTMENT (OUTPATIENT)
Dept: GENERAL RADIOLOGY | Facility: HOSPITAL | Age: 75
End: 2025-05-30
Payer: MEDICARE

## 2025-05-30 DIAGNOSIS — R52 UNCONTROLLED PAIN: Primary | ICD-10-CM

## 2025-05-30 PROBLEM — L71.8 ACNE ROSACEA, PAPULAR TYPE: Status: ACTIVE | Noted: 2024-02-26

## 2025-05-30 PROBLEM — E11.40 NEUROPATHY DUE TO TYPE 2 DIABETES MELLITUS: Status: ACTIVE | Noted: 2025-05-17

## 2025-05-30 PROBLEM — K55.1 MESENTERIC ISCHEMIA, CHRONIC: Status: ACTIVE | Noted: 2025-05-30

## 2025-05-30 PROBLEM — K85.90 ACUTE PANCREATITIS: Status: ACTIVE | Noted: 2025-02-18

## 2025-05-30 PROBLEM — I63.9 CEREBROVASCULAR ACCIDENT: Status: ACTIVE | Noted: 2023-07-17

## 2025-05-30 PROBLEM — Z87.891 EX-CIGARETTE SMOKER: Status: ACTIVE | Noted: 2025-02-18

## 2025-05-30 PROBLEM — S32.009A CLOSED FRACTURE OF TRANSVERSE PROCESS OF LUMBAR VERTEBRA: Status: ACTIVE | Noted: 2025-05-29

## 2025-05-30 PROBLEM — E03.9 ACQUIRED HYPOTHYROIDISM: Status: ACTIVE | Noted: 2025-05-30

## 2025-05-30 PROBLEM — D68.9 BLOOD COAGULATION DISORDER: Status: ACTIVE | Noted: 2025-05-30

## 2025-05-30 PROBLEM — L20.9 ATOPIC DERMATITIS: Status: ACTIVE | Noted: 2023-11-16

## 2025-05-30 PROBLEM — G93.32 CHRONIC FATIGUE SYNDROME: Status: ACTIVE | Noted: 2025-05-30

## 2025-05-30 LAB
A-A DO2: 30.7 MMHG (ref 0–300)
ALBUMIN SERPL-MCNC: 3.9 G/DL (ref 3.5–5.2)
ALBUMIN/GLOB SERPL: 1.2 G/DL
ALP SERPL-CCNC: 87 U/L (ref 39–117)
ALT SERPL W P-5'-P-CCNC: 22 U/L (ref 1–41)
ANION GAP SERPL CALCULATED.3IONS-SCNC: 8.7 MMOL/L (ref 5–15)
APTT PPP: 28.9 SECONDS (ref 24.5–35.9)
ARTERIAL PATENCY WRIST A: POSITIVE
AST SERPL-CCNC: 23 U/L (ref 1–40)
ATMOSPHERIC PRESS: 722 MMHG
BASE EXCESS BLDA CALC-SCNC: 5 MMOL/L (ref 0–2)
BASOPHILS # BLD AUTO: 0.02 10*3/MM3 (ref 0–0.2)
BASOPHILS NFR BLD AUTO: 0.4 % (ref 0–1.5)
BDY SITE: ABNORMAL
BILIRUB SERPL-MCNC: 0.4 MG/DL (ref 0–1.2)
BILIRUB UR QL STRIP: NEGATIVE
BUN SERPL-MCNC: 22.6 MG/DL (ref 8–23)
BUN/CREAT SERPL: 24.6 (ref 7–25)
CALCIUM SPEC-SCNC: 9.1 MG/DL (ref 8.6–10.5)
CHLORIDE SERPL-SCNC: 100 MMOL/L (ref 98–107)
CLARITY UR: CLEAR
CO2 BLDA-SCNC: 32.2 MMOL/L (ref 22–33)
CO2 SERPL-SCNC: 29.3 MMOL/L (ref 22–29)
COHGB MFR BLD: 2 % (ref 0–5)
COLOR UR: YELLOW
CREAT SERPL-MCNC: 0.92 MG/DL (ref 0.76–1.27)
DEPRECATED RDW RBC AUTO: 45.2 FL (ref 37–54)
EGFRCR SERPLBLD CKD-EPI 2021: 86.2 ML/MIN/1.73
EOSINOPHIL # BLD AUTO: 0.07 10*3/MM3 (ref 0–0.4)
EOSINOPHIL NFR BLD AUTO: 1.5 % (ref 0.3–6.2)
ERYTHROCYTE [DISTWIDTH] IN BLOOD BY AUTOMATED COUNT: 14.2 % (ref 12.3–15.4)
GEN 5 1HR TROPONIN T REFLEX: 9 NG/L
GLOBULIN UR ELPH-MCNC: 3.2 GM/DL
GLUCOSE SERPL-MCNC: 269 MG/DL (ref 65–99)
GLUCOSE UR STRIP-MCNC: ABNORMAL MG/DL
HCO3 BLDA-SCNC: 30.7 MMOL/L (ref 20–26)
HCT VFR BLD AUTO: 36.8 % (ref 37.5–51)
HCT VFR BLD CALC: 37.1 % (ref 38–51)
HGB BLD-MCNC: 11.5 G/DL (ref 13–17.7)
HGB BLDA-MCNC: 12.1 G/DL (ref 14–18)
HGB UR QL STRIP.AUTO: NEGATIVE
HOLD SPECIMEN: NORMAL
HOLD SPECIMEN: NORMAL
IMM GRANULOCYTES # BLD AUTO: 0.01 10*3/MM3 (ref 0–0.05)
IMM GRANULOCYTES NFR BLD AUTO: 0.2 % (ref 0–0.5)
INHALED O2 CONCENTRATION: 21 %
INR PPP: 0.99 (ref 0.9–1.1)
KETONES UR QL STRIP: NEGATIVE
LEUKOCYTE ESTERASE UR QL STRIP.AUTO: NEGATIVE
LIPASE SERPL-CCNC: 52 U/L (ref 13–60)
LYMPHOCYTES # BLD AUTO: 1.21 10*3/MM3 (ref 0.7–3.1)
LYMPHOCYTES NFR BLD AUTO: 26.1 % (ref 19.6–45.3)
Lab: ABNORMAL
MCH RBC QN AUTO: 27.3 PG (ref 26.6–33)
MCHC RBC AUTO-ENTMCNC: 31.3 G/DL (ref 31.5–35.7)
MCV RBC AUTO: 87.2 FL (ref 79–97)
METHGB BLD QL: 0.4 % (ref 0–3)
MODALITY: ABNORMAL
MONOCYTES # BLD AUTO: 0.5 10*3/MM3 (ref 0.1–0.9)
MONOCYTES NFR BLD AUTO: 10.8 % (ref 5–12)
NEUTROPHILS NFR BLD AUTO: 2.82 10*3/MM3 (ref 1.7–7)
NEUTROPHILS NFR BLD AUTO: 61 % (ref 42.7–76)
NITRITE UR QL STRIP: NEGATIVE
NRBC BLD AUTO-RTO: 0 /100 WBC (ref 0–0.2)
NT-PROBNP SERPL-MCNC: 77.8 PG/ML (ref 0–1800)
OXYHGB MFR BLDV: 87.8 % (ref 94–99)
PCO2 BLDA: 48.9 MM HG (ref 35–45)
PCO2 TEMP ADJ BLD: ABNORMAL MM[HG]
PH BLDA: 7.41 PH UNITS (ref 7.35–7.45)
PH UR STRIP.AUTO: 6 [PH] (ref 5–8)
PH, TEMP CORRECTED: ABNORMAL
PLATELET # BLD AUTO: 148 10*3/MM3 (ref 140–450)
PMV BLD AUTO: 10 FL (ref 6–12)
PO2 BLDA: 55.8 MM HG (ref 83–108)
PO2 TEMP ADJ BLD: ABNORMAL MM[HG]
POTASSIUM SERPL-SCNC: 4.5 MMOL/L (ref 3.5–5.2)
PROT SERPL-MCNC: 7.1 G/DL (ref 6–8.5)
PROT UR QL STRIP: NEGATIVE
PROTHROMBIN TIME: 13.7 SECONDS (ref 12.5–15.2)
RBC # BLD AUTO: 4.22 10*6/MM3 (ref 4.14–5.8)
SAO2 % BLDCOA: 90 % (ref 94–99)
SODIUM SERPL-SCNC: 138 MMOL/L (ref 136–145)
SP GR UR STRIP: >1.03 (ref 1–1.03)
TROPONIN T NUMERIC DELTA: 1 NG/L
TROPONIN T SERPL HS-MCNC: 8 NG/L
UROBILINOGEN UR QL STRIP: ABNORMAL
VENTILATOR MODE: ABNORMAL
WBC NRBC COR # BLD AUTO: 4.63 10*3/MM3 (ref 3.4–10.8)
WHOLE BLOOD HOLD COAG: NORMAL
WHOLE BLOOD HOLD SPECIMEN: NORMAL

## 2025-05-30 PROCEDURE — 85025 COMPLETE CBC W/AUTO DIFF WBC: CPT | Performed by: PHYSICIAN ASSISTANT

## 2025-05-30 PROCEDURE — 36600 WITHDRAWAL OF ARTERIAL BLOOD: CPT

## 2025-05-30 PROCEDURE — 72128 CT CHEST SPINE W/O DYE: CPT | Performed by: RADIOLOGY

## 2025-05-30 PROCEDURE — 99285 EMERGENCY DEPT VISIT HI MDM: CPT

## 2025-05-30 PROCEDURE — 36415 COLL VENOUS BLD VENIPUNCTURE: CPT

## 2025-05-30 PROCEDURE — 83050 HGB METHEMOGLOBIN QUAN: CPT

## 2025-05-30 PROCEDURE — 80307 DRUG TEST PRSMV CHEM ANLYZR: CPT

## 2025-05-30 PROCEDURE — 83690 ASSAY OF LIPASE: CPT | Performed by: PHYSICIAN ASSISTANT

## 2025-05-30 PROCEDURE — 71045 X-RAY EXAM CHEST 1 VIEW: CPT | Performed by: RADIOLOGY

## 2025-05-30 PROCEDURE — 73502 X-RAY EXAM HIP UNI 2-3 VIEWS: CPT | Performed by: RADIOLOGY

## 2025-05-30 PROCEDURE — 72125 CT NECK SPINE W/O DYE: CPT | Performed by: RADIOLOGY

## 2025-05-30 PROCEDURE — 72125 CT NECK SPINE W/O DYE: CPT

## 2025-05-30 PROCEDURE — 85730 THROMBOPLASTIN TIME PARTIAL: CPT | Performed by: PHYSICIAN ASSISTANT

## 2025-05-30 PROCEDURE — 71275 CT ANGIOGRAPHY CHEST: CPT | Performed by: RADIOLOGY

## 2025-05-30 PROCEDURE — 93005 ELECTROCARDIOGRAM TRACING: CPT | Performed by: PHYSICIAN ASSISTANT

## 2025-05-30 PROCEDURE — 85610 PROTHROMBIN TIME: CPT | Performed by: PHYSICIAN ASSISTANT

## 2025-05-30 PROCEDURE — 72128 CT CHEST SPINE W/O DYE: CPT

## 2025-05-30 PROCEDURE — 70450 CT HEAD/BRAIN W/O DYE: CPT

## 2025-05-30 PROCEDURE — 70450 CT HEAD/BRAIN W/O DYE: CPT | Performed by: RADIOLOGY

## 2025-05-30 PROCEDURE — 72131 CT LUMBAR SPINE W/O DYE: CPT

## 2025-05-30 PROCEDURE — 72131 CT LUMBAR SPINE W/O DYE: CPT | Performed by: RADIOLOGY

## 2025-05-30 PROCEDURE — 71275 CT ANGIOGRAPHY CHEST: CPT

## 2025-05-30 PROCEDURE — 93010 ELECTROCARDIOGRAM REPORT: CPT | Performed by: INTERNAL MEDICINE

## 2025-05-30 PROCEDURE — 71045 X-RAY EXAM CHEST 1 VIEW: CPT

## 2025-05-30 PROCEDURE — 73502 X-RAY EXAM HIP UNI 2-3 VIEWS: CPT

## 2025-05-30 PROCEDURE — 80053 COMPREHEN METABOLIC PANEL: CPT | Performed by: PHYSICIAN ASSISTANT

## 2025-05-30 PROCEDURE — 83880 ASSAY OF NATRIURETIC PEPTIDE: CPT | Performed by: PHYSICIAN ASSISTANT

## 2025-05-30 PROCEDURE — 83036 HEMOGLOBIN GLYCOSYLATED A1C: CPT

## 2025-05-30 PROCEDURE — 84439 ASSAY OF FREE THYROXINE: CPT

## 2025-05-30 PROCEDURE — 99223 1ST HOSP IP/OBS HIGH 75: CPT

## 2025-05-30 PROCEDURE — 82805 BLOOD GASES W/O2 SATURATION: CPT

## 2025-05-30 PROCEDURE — 84443 ASSAY THYROID STIM HORMONE: CPT

## 2025-05-30 PROCEDURE — 82375 ASSAY CARBOXYHB QUANT: CPT

## 2025-05-30 PROCEDURE — 81003 URINALYSIS AUTO W/O SCOPE: CPT | Performed by: PHYSICIAN ASSISTANT

## 2025-05-30 PROCEDURE — 84484 ASSAY OF TROPONIN QUANT: CPT | Performed by: PHYSICIAN ASSISTANT

## 2025-05-30 PROCEDURE — 25510000001 IOPAMIDOL PER 1 ML

## 2025-05-30 RX ORDER — IOPAMIDOL 755 MG/ML
100 INJECTION, SOLUTION INTRAVASCULAR
Status: COMPLETED | OUTPATIENT
Start: 2025-05-30 | End: 2025-05-30

## 2025-05-30 RX ORDER — SODIUM CHLORIDE 0.9 % (FLUSH) 0.9 %
10 SYRINGE (ML) INJECTION AS NEEDED
Status: DISCONTINUED | OUTPATIENT
Start: 2025-05-30 | End: 2025-06-03 | Stop reason: HOSPADM

## 2025-05-30 RX ADMIN — IOPAMIDOL 80 ML: 755 INJECTION, SOLUTION INTRAVENOUS at 20:16

## 2025-05-30 NOTE — ED NOTES
"Pt's family called out stating they needed help. RN entered room and family stated that patient stated \"I am dying\" and then went unresponsive. Family stated that they werent sure if he was breathing and tried shaking him prior to RN entering the room. RN checked pt's pulse and placed patient on monitor. Pt was responsive to sternal rub and stated he feels OK upon awakening. Pt states he does feel weak. Vitals WNL.    "

## 2025-05-31 LAB
ALBUMIN SERPL-MCNC: 3.6 G/DL (ref 3.5–5.2)
ALBUMIN/GLOB SERPL: 1.1 G/DL
ALP SERPL-CCNC: 85 U/L (ref 39–117)
ALT SERPL W P-5'-P-CCNC: 19 U/L (ref 1–41)
AMPHET+METHAMPHET UR QL: NEGATIVE
AMPHETAMINES UR QL: NEGATIVE
ANION GAP SERPL CALCULATED.3IONS-SCNC: 9.4 MMOL/L (ref 5–15)
AST SERPL-CCNC: 23 U/L (ref 1–40)
BARBITURATES UR QL SCN: NEGATIVE
BASOPHILS # BLD AUTO: 0.01 10*3/MM3 (ref 0–0.2)
BASOPHILS NFR BLD AUTO: 0.2 % (ref 0–1.5)
BENZODIAZ UR QL SCN: NEGATIVE
BILIRUB SERPL-MCNC: 0.5 MG/DL (ref 0–1.2)
BUN SERPL-MCNC: 17.3 MG/DL (ref 8–23)
BUN/CREAT SERPL: 24 (ref 7–25)
BUPRENORPHINE SERPL-MCNC: NEGATIVE NG/ML
CALCIUM SPEC-SCNC: 8.7 MG/DL (ref 8.6–10.5)
CANNABINOIDS SERPL QL: NEGATIVE
CHLORIDE SERPL-SCNC: 103 MMOL/L (ref 98–107)
CO2 SERPL-SCNC: 25.6 MMOL/L (ref 22–29)
COCAINE UR QL: NEGATIVE
CREAT SERPL-MCNC: 0.72 MG/DL (ref 0.76–1.27)
DEPRECATED RDW RBC AUTO: 44.9 FL (ref 37–54)
EGFRCR SERPLBLD CKD-EPI 2021: 94.7 ML/MIN/1.73
EOSINOPHIL # BLD AUTO: 0.08 10*3/MM3 (ref 0–0.4)
EOSINOPHIL NFR BLD AUTO: 1.6 % (ref 0.3–6.2)
ERYTHROCYTE [DISTWIDTH] IN BLOOD BY AUTOMATED COUNT: 14 % (ref 12.3–15.4)
FENTANYL UR-MCNC: NEGATIVE NG/ML
GEN 5 1HR TROPONIN T REFLEX: 8 NG/L
GLOBULIN UR ELPH-MCNC: 3.3 GM/DL
GLUCOSE SERPL-MCNC: 173 MG/DL (ref 65–99)
HBA1C MFR BLD: 8.9 % (ref 4.8–5.6)
HCT VFR BLD AUTO: 37.6 % (ref 37.5–51)
HGB BLD-MCNC: 11.7 G/DL (ref 13–17.7)
IMM GRANULOCYTES # BLD AUTO: 0.01 10*3/MM3 (ref 0–0.05)
IMM GRANULOCYTES NFR BLD AUTO: 0.2 % (ref 0–0.5)
LYMPHOCYTES # BLD AUTO: 1.15 10*3/MM3 (ref 0.7–3.1)
LYMPHOCYTES NFR BLD AUTO: 22.8 % (ref 19.6–45.3)
MCH RBC QN AUTO: 27.2 PG (ref 26.6–33)
MCHC RBC AUTO-ENTMCNC: 31.1 G/DL (ref 31.5–35.7)
MCV RBC AUTO: 87.4 FL (ref 79–97)
METHADONE UR QL SCN: NEGATIVE
MONOCYTES # BLD AUTO: 0.54 10*3/MM3 (ref 0.1–0.9)
MONOCYTES NFR BLD AUTO: 10.7 % (ref 5–12)
NEUTROPHILS NFR BLD AUTO: 3.25 10*3/MM3 (ref 1.7–7)
NEUTROPHILS NFR BLD AUTO: 64.5 % (ref 42.7–76)
NRBC BLD AUTO-RTO: 0 /100 WBC (ref 0–0.2)
OPIATES UR QL: POSITIVE
OXYCODONE UR QL SCN: POSITIVE
PCP UR QL SCN: NEGATIVE
PLATELET # BLD AUTO: 133 10*3/MM3 (ref 140–450)
PMV BLD AUTO: 9.7 FL (ref 6–12)
POTASSIUM SERPL-SCNC: 4 MMOL/L (ref 3.5–5.2)
PROT SERPL-MCNC: 6.9 G/DL (ref 6–8.5)
QT INTERVAL: 362 MS
QTC INTERVAL: 409 MS
RBC # BLD AUTO: 4.3 10*6/MM3 (ref 4.14–5.8)
SODIUM SERPL-SCNC: 138 MMOL/L (ref 136–145)
T4 FREE SERPL-MCNC: 1.02 NG/DL (ref 0.92–1.68)
TRICYCLICS UR QL SCN: NEGATIVE
TROPONIN T NUMERIC DELTA: 0 NG/L
TROPONIN T SERPL HS-MCNC: 8 NG/L
TSH SERPL DL<=0.05 MIU/L-ACNC: 2.78 UIU/ML (ref 0.27–4.2)
WBC NRBC COR # BLD AUTO: 5.04 10*3/MM3 (ref 3.4–10.8)

## 2025-05-31 PROCEDURE — 93010 ELECTROCARDIOGRAM REPORT: CPT | Performed by: INTERNAL MEDICINE

## 2025-05-31 PROCEDURE — 85025 COMPLETE CBC W/AUTO DIFF WBC: CPT

## 2025-05-31 PROCEDURE — 93005 ELECTROCARDIOGRAM TRACING: CPT | Performed by: STUDENT IN AN ORGANIZED HEALTH CARE EDUCATION/TRAINING PROGRAM

## 2025-05-31 PROCEDURE — 84484 ASSAY OF TROPONIN QUANT: CPT | Performed by: STUDENT IN AN ORGANIZED HEALTH CARE EDUCATION/TRAINING PROGRAM

## 2025-05-31 PROCEDURE — 99232 SBSQ HOSP IP/OBS MODERATE 35: CPT | Performed by: STUDENT IN AN ORGANIZED HEALTH CARE EDUCATION/TRAINING PROGRAM

## 2025-05-31 PROCEDURE — 80053 COMPREHEN METABOLIC PANEL: CPT

## 2025-05-31 RX ORDER — BISACODYL 10 MG
10 SUPPOSITORY, RECTAL RECTAL DAILY PRN
Status: DISCONTINUED | OUTPATIENT
Start: 2025-05-31 | End: 2025-05-31

## 2025-05-31 RX ORDER — OXYCODONE AND ACETAMINOPHEN 5; 325 MG/1; MG/1
1 TABLET ORAL EVERY 6 HOURS PRN
COMMUNITY

## 2025-05-31 RX ORDER — POLYETHYLENE GLYCOL 3350 17 G/17G
17 POWDER, FOR SOLUTION ORAL DAILY PRN
Status: DISCONTINUED | OUTPATIENT
Start: 2025-05-31 | End: 2025-05-31

## 2025-05-31 RX ORDER — BISACODYL 5 MG/1
5 TABLET, DELAYED RELEASE ORAL DAILY
Status: DISCONTINUED | OUTPATIENT
Start: 2025-05-31 | End: 2025-06-01

## 2025-05-31 RX ORDER — LIDOCAINE 4 G/G
1 PATCH TOPICAL
Status: DISCONTINUED | OUTPATIENT
Start: 2025-05-31 | End: 2025-05-31

## 2025-05-31 RX ORDER — ASPIRIN 81 MG/1
81 TABLET ORAL DAILY
Status: DISCONTINUED | OUTPATIENT
Start: 2025-05-31 | End: 2025-06-03 | Stop reason: HOSPADM

## 2025-05-31 RX ORDER — ACETAMINOPHEN 325 MG/1
650 TABLET ORAL EVERY 4 HOURS PRN
Status: DISCONTINUED | OUTPATIENT
Start: 2025-05-31 | End: 2025-06-03 | Stop reason: HOSPADM

## 2025-05-31 RX ORDER — OXYCODONE HYDROCHLORIDE 5 MG/1
5 TABLET ORAL EVERY 6 HOURS PRN
Refills: 0 | Status: DISCONTINUED | OUTPATIENT
Start: 2025-05-31 | End: 2025-05-31

## 2025-05-31 RX ORDER — OXYCODONE AND ACETAMINOPHEN 10; 325 MG/1; MG/1
1 TABLET ORAL EVERY 6 HOURS PRN
Refills: 0 | Status: DISCONTINUED | OUTPATIENT
Start: 2025-05-31 | End: 2025-06-01

## 2025-05-31 RX ORDER — ACETAMINOPHEN 160 MG/5ML
650 SOLUTION ORAL EVERY 4 HOURS PRN
Status: DISCONTINUED | OUTPATIENT
Start: 2025-05-31 | End: 2025-06-03 | Stop reason: HOSPADM

## 2025-05-31 RX ORDER — BISACODYL 5 MG/1
5 TABLET, DELAYED RELEASE ORAL DAILY PRN
Status: DISCONTINUED | OUTPATIENT
Start: 2025-05-31 | End: 2025-05-31

## 2025-05-31 RX ORDER — LIDOCAINE 4 G/G
1 PATCH TOPICAL
Status: DISCONTINUED | OUTPATIENT
Start: 2025-05-31 | End: 2025-06-03 | Stop reason: HOSPADM

## 2025-05-31 RX ORDER — LACTULOSE 10 G/15ML
20 SOLUTION ORAL 3 TIMES DAILY
Status: DISCONTINUED | OUTPATIENT
Start: 2025-05-31 | End: 2025-06-01

## 2025-05-31 RX ORDER — OXYCODONE AND ACETAMINOPHEN 5; 325 MG/1; MG/1
1 TABLET ORAL EVERY 6 HOURS PRN
Status: CANCELLED | OUTPATIENT
Start: 2025-05-31

## 2025-05-31 RX ORDER — SODIUM CHLORIDE 0.9 % (FLUSH) 0.9 %
10 SYRINGE (ML) INJECTION AS NEEDED
Status: DISCONTINUED | OUTPATIENT
Start: 2025-05-31 | End: 2025-06-03 | Stop reason: HOSPADM

## 2025-05-31 RX ORDER — AMOXICILLIN 250 MG
2 CAPSULE ORAL 2 TIMES DAILY
Status: DISCONTINUED | OUTPATIENT
Start: 2025-05-31 | End: 2025-05-31

## 2025-05-31 RX ORDER — SODIUM CHLORIDE 0.9 % (FLUSH) 0.9 %
10 SYRINGE (ML) INJECTION EVERY 12 HOURS SCHEDULED
Status: DISCONTINUED | OUTPATIENT
Start: 2025-05-31 | End: 2025-06-03 | Stop reason: HOSPADM

## 2025-05-31 RX ORDER — BISACODYL 10 MG
10 SUPPOSITORY, RECTAL RECTAL DAILY PRN
Status: DISCONTINUED | OUTPATIENT
Start: 2025-05-31 | End: 2025-06-01

## 2025-05-31 RX ORDER — ONDANSETRON 2 MG/ML
4 INJECTION INTRAMUSCULAR; INTRAVENOUS EVERY 6 HOURS PRN
Status: DISCONTINUED | OUTPATIENT
Start: 2025-05-31 | End: 2025-06-03 | Stop reason: HOSPADM

## 2025-05-31 RX ORDER — POLYETHYLENE GLYCOL 3350 17 G/17G
17 POWDER, FOR SOLUTION ORAL DAILY
Status: DISCONTINUED | OUTPATIENT
Start: 2025-05-31 | End: 2025-06-01

## 2025-05-31 RX ORDER — AMOXICILLIN 250 MG
2 CAPSULE ORAL 2 TIMES DAILY
Status: DISCONTINUED | OUTPATIENT
Start: 2025-05-31 | End: 2025-06-01

## 2025-05-31 RX ORDER — ACETAMINOPHEN 650 MG/1
650 SUPPOSITORY RECTAL EVERY 4 HOURS PRN
Status: DISCONTINUED | OUTPATIENT
Start: 2025-05-31 | End: 2025-06-03 | Stop reason: HOSPADM

## 2025-05-31 RX ORDER — SODIUM CHLORIDE 9 MG/ML
40 INJECTION, SOLUTION INTRAVENOUS AS NEEDED
Status: DISCONTINUED | OUTPATIENT
Start: 2025-05-31 | End: 2025-06-03 | Stop reason: HOSPADM

## 2025-05-31 RX ADMIN — ASPIRIN 81 MG: 81 TABLET, DELAYED RELEASE ORAL at 08:10

## 2025-05-31 RX ADMIN — BISACODYL 5 MG: 5 TABLET, COATED ORAL at 16:06

## 2025-05-31 RX ADMIN — OXYCODONE AND ACETAMINOPHEN 1 TABLET: 10; 325 TABLET ORAL at 08:25

## 2025-05-31 RX ADMIN — Medication 10 ML: at 03:14

## 2025-05-31 RX ADMIN — DOCUSATE SODIUM 50MG AND SENNOSIDES 8.6MG 2 TABLET: 8.6; 5 TABLET, FILM COATED ORAL at 20:05

## 2025-05-31 RX ADMIN — OXYCODONE AND ACETAMINOPHEN 1 TABLET: 10; 325 TABLET ORAL at 19:11

## 2025-05-31 RX ADMIN — LACTULOSE 20 G: 20 SOLUTION ORAL at 16:06

## 2025-05-31 RX ADMIN — Medication 1 PATCH: at 03:14

## 2025-05-31 RX ADMIN — Medication 10 ML: at 20:05

## 2025-05-31 RX ADMIN — DOCUSATE SODIUM 50MG AND SENNOSIDES 8.6MG 2 TABLET: 8.6; 5 TABLET, FILM COATED ORAL at 08:10

## 2025-05-31 RX ADMIN — OXYCODONE 5 MG: 5 TABLET ORAL at 02:19

## 2025-05-31 RX ADMIN — LACTULOSE 20 G: 20 SOLUTION ORAL at 20:05

## 2025-05-31 RX ADMIN — POLYETHYLENE GLYCOL (3350) 17 G: 17 POWDER, FOR SOLUTION ORAL at 16:06

## 2025-05-31 NOTE — PROGRESS NOTES
[facility] HOSPITALIST PROGRESS NOTE     Patient Identification:  Name:  Cecil Gomez  Age:  75 y.o.  Sex:  male  :  1950  MRN:  [id number]  Visit Number:  [id number]  ROOM: [address] [place] [address]     Primary Care Provider:  Tavares Guerrero MD    Length of stay in inpatient status:  1    Subjective     Chief Compliant:    Chief Complaint   Patient presents with    Fall    Back Pain       History of Presenting Illness: Patient seen evaluated in follow-up for intractable pain secondary to a acute fracture of the right L1 and L2 transverse processes as previously found after recent fall on 2025.  Patient also with significant constipation unable to have bowel movement since initiation of opioid therapy.  Percocet increased from 5 mg to 10 mg with improved pain control and bowel regimen initiated.  Patient otherwise without acute complaint.      Objective     Current Hospital Meds:  aspirin, 81 mg, Oral, Daily  senna-docusate sodium, 2 tablet, Oral, BID   And  polyethylene glycol, 17 g, Oral, Daily   And  bisacodyl, 5 mg, Oral, Daily  lactulose, 20 g, Oral, TID  Lidocaine, 1 patch, Transdermal, Q24H  sodium chloride, 10 mL, Intravenous, Q12H         ----------------------------------------------------------------------------------------------------------------------  Vital Signs:  Temp:  [98.1 °F (36.7 °C)-98.5 °F (36.9 °C)] 98.1 °F (36.7 °C)  Heart Rate:  [58-81] 81  Resp:  [16-18] 18  BP: (106-171)/(56-96) 164/96  SpO2:  [92 %-100 %] 93 %  on   ;   Device (Oxygen Therapy): room air  Body mass index is 24.22 kg/m².      Intake/Output Summary (Last 24 hours) at 2025  Last data filed at 2025 1725  Gross per 24 hour   Intake 840 ml   Output 1050 ml   Net -210 ml      ----------------------------------------------------------------------------------------------------------------------  Physical exam:  Constitutional:  Chronically ill-appearing elderly adult male resting in bed in no  "distress.   HENT:  Head:  Normocephalic and atraumatic.  Mouth:  Moist mucous membranes.    Eyes:  Conjunctivae and EOM are normal. No scleral icterus.    Neck:  Neck supple.  No JVD present.    Cardiovascular:  Normal rate, regular rhythm and normal heart sounds with no murmur.  Pulmonary/Chest:  No respiratory distress, no wheezes, no crackles, with normal breath sounds and good air movement.  Abdominal:  Soft.  Bowel sounds are normal.  No distension and no tenderness.   Musculoskeletal:  No tenderness, and no deformity.  No red or swollen joints anywhere.  Range of motion and strength in the right lower extremity.  Neurological:  Alert and oriented to person, place, and time.  No cranial nerve deficit.  No tongue deviation.  No facial droop.  No slurred speech. Intact Sensation throughout  Skin:  Skin is warm and dry. No rash or lesion noted. No pallor.   Peripheral vascular:  Pulses in all 4 extremities with no clubbing, no cyanosis, no edema.  Psychiatric: Appropriate mood and affect, pleasant.   ----------------------------------------------------------------------------------------------------------------------  WBC/HGB/HCT/PLT   5.04/11.7/37.6/133 (05/31 0254)  BUN/CREAT/GLUC/ALT/AST/CHARLENE/LIP    17.3/0.72/173/19/23/--/-- (05/31 0254)  LYTES - Na/K/Cl/CO2: 138/4.0/103/25.6 (05/31 0254)        No results found for: \"URINECX\"  No results found for: \"BLOODCX\"    I have personally looked at the labs and they are summarized above.  ----------------------------------------------------------------------------------------------------------------------  Detailed radiology reports for the last 24 hours:  CT Angiogram Chest Pulmonary Embolism  Result Date: 5/30/2025   No thoracic aortic aneurysm or dissection. No pulmonary embolus. Emphysematous changes in the upper lobes. Bronchial inflammation. Normal thyroid gland. Mild enlarged heart size Small size hiatal hernia. Atelectasis at the lung bases including the right " middle lobe and lower lingula. Cholecystectomy. No free fluid or free air in the upper abdomen.   This report was finalized on 5/30/2025 10:17 PM by Manan Rankni MD.      XR Chest 1 View  Result Date: 5/30/2025   Enlarged heart size. Mild atelectasis at the lung bases, left greater than right. No pleural effusion or pneumothorax No fracture or foreign body. Cholecystectomy.  This report was finalized on 5/30/2025 9:12 PM by Manan Rankin MD.      CT Thoracic Spine Without Contrast  Result Date: 5/30/2025  Impression: 1. No acute osseous abnormality.  This report was finalized on 5/30/2025 6:53 PM by Conrado Worthington DO.      CT Lumbar Spine Without Contrast  Result Date: 5/30/2025  Impression: 1. Acute fractures right L1 and L2 transverse processes.  This report was finalized on 5/30/2025 6:53 PM by Conrado Worthington DO.      CT Cervical Spine Without Contrast  Result Date: 5/30/2025  Impression: 1. No acute fracture  This report was finalized on 5/30/2025 6:53 PM by Conrado Worthington DO.      CT Head Without Contrast  Result Date: 5/30/2025  1.  No acute intracranial abnormality.  This report was finalized on 5/30/2025 6:53 PM by oCnrado Worthington DO.      XR Hip With or Without Pelvis 2 - 3 View Right  Result Date: 5/30/2025    No acute findings in the right hip.  This report was finalized on 5/30/2025 4:56 PM by Dr. Norbert Boles MD.      Assessment & Plan      Intractable pain  Acute fracture of right L1 and L2 transverse process  Physical debility secondary to pain  Acute hypoxic insufficiency secondary to splinting  Opioid-induced constipation    - Increased pain regiment from previously prescribed Percocet 5 mg to 10 mg and patient reporting improved pain tolerance.    - Patient with complaints of no bowel movement for the last 10 to 11 days.  Will initiate on aggressive bowel regimen including senna, MiraLAX, bisacodyl, and lactulose all scheduled.    - Zofran as needed for nausea.    - Patient did undergo CT  angiogram of the chest with no evidence of PE, pneumothorax or pneumonia with no documentation of any rib fractures.    Chronic:  Anemia  Hypertension  Hyperlipidemia  Hypothyroidism  Chronic mesenteric ischemia  Hx abdominal vein thromboembolism  History of stroke  BPH  Diabetes mellitus type 2  Peripheral neuropathy  History of migraines  GERD  Depression    Copied text in portions of the note has been reviewed and is accurate as of .TODAYDATE    VTE Prophylaxis:     Active VTE Prophylaxis  Mechanical:        Start        05/31/25 0102  Maintain Sequential Compression Device  Continuous                          Select Pharmacologic VTE Prophylaxis if Desired & Appropriate       Disposition likely home in the next 24 to 48 hours pending adequate pain control and bowel regiment without further opioid-induced constipation.    Santiago Garcia DO  [facility]  05/31/25  19:32 EDT

## 2025-05-31 NOTE — H&P
[facility] Medicine Services  History & Physical    Patient Identification:  Name:  Cecil Gomez  Age:  75 y.o.  Sex:  male  :  1950  MRN:  [id number]   Visit Number:  [id number]  Admit Date: 2025   Primary Care Physician:  Tavares Guerrero MD    Subjective     Chief complaint: Pain, Debility    History of presenting illness:      Cecil Gomez is a 75 y.o. male with past medical history significant for anemia, primary hypertension, hypercholesterolemia, acquired hypothyroidism, chronic mesenteric ischemia, history of blood clot in abdominal vein, history of cerebellar infarct, BPH, type 2 diabetes mellitus, peripheral neuropathy, former cigarette smoker, history of migraines, GERD, and depression.    Patient presented to [facility] emergency department due to uncontrolled pain and physical debility.  Patient's spouse states that patient has been unable to stand up or ambulate without assistance and she is unable to care for him at home.  Per chart review, it appears that patient slipped and fell on some stairs landing on his back on 2025.  He was found to have right 11th rib fracture posteriorly as well as fractures of transverse processes of L1 and L2.  Patient was prescribed hydrocodone 5 mg from the emergency department on the date of fall.  Patient had stated this was ineffective.  He was seen by his primary care provider on 2025 and was given a prescription for oxycodone 5 mg per review of PDMP.  Patient states that this has also been overall ineffective for controlling his pain.  He denies any saddle anesthesia symptoms such as loss of bowel or bladder control.  He states that sensation is intact.  Both lower extremities neurovascularly intact on physical exam.  Patient states that he has significant pain all the way from the right thigh up to the right chest from his recent fall.  He states that he is having a hard time taking deep breaths due to  pain.  He was given an incentive spirometer from the ER but states that he has been unable to utilize this.  Patient states that he did hit his head during recent fall.  CT of the head obtained in the ED today with no acute findings.  No evidence of bleed or fracture.  CT of the cervical, thoracic, and lumbar spine noted acute fractures of the right L1 and L2 transverse processes.  There was no mention of rib fracture on chest x-ray or CT angiogram of the chest.  ABG was obtained in ED revealing mild hypoxemia with PO2 in the 50s.  Currently there is no suspicion for pneumonia.  Suspect acute hypoxemic respiratory failure due to inability to take deep breaths.  He is at high risk for developing pneumonia.  Patient and spouse state that they are interested in short-term physical rehab.  Discussed current admission plans with patient and spouse.  They voiced agreement and understanding with no further questions or concerns at this time.    Upon arrival to the ED, vital signs were temperature 98.3, pulse 82, respirations 14, blood pressure 144/65 sitting, SpO2 saturation 90% on room air.  ABG with pH 7.406, PCO2 48.9, PO2 55.8, HCO3 30.7, O2 saturation 90% on room air.  High-sensitivity troponin T negative x 2.  proBNP not elevated.  CMP with CO2 29.3, glucose 269, otherwise unremarkable.  Lipase negative.  PT/INR within normal limits.  CBC with hemoglobin 11.5, hematocrit 36.8, MCHC 31.3, otherwise unremarkable.  Urinalysis with 2+ glucose, otherwise unremarkable.  X-ray of the right hip noted no acute findings.  X-ray of the chest noted enlarged heart size.  Mild atelectasis at the lung bases, left greater than right.  No pleural effusion or pneumothorax.  No fracture or foreign body.  Cholecystectomy.  CT of the head without contrast noted no acute intracranial abnormality.  CT of the cervical spine noted no acute fracture.  CT of the thoracic spine noted no acute osseous abnormality.  CT of the lumbar spine noted  acute fractures right L1 and L2 transverse processes.  CT angiogram of the chest noted no thoracic aortic aneurysm or dissection.  No pulmonary embolus.  Emphysematous changes in the upper lobes.  Bronchial inflammation.  Normal thyroid gland.  Mild enlarged heart size.  Small size hiatal hernia.  Atelectasis of the lung bases including the right middle lobe and lower lingula.  Cholecystectomy.  No free fluid or free air in the upper abdomen.  Also noted prior fracture across the base of the right L1 transverse process with distraction of the fractured components.    Known Emergency Department medications received prior to my evaluation included N/A (no meds given in ED). Room location at the time of my evaluation was [facility] [address]. Discussed with admitting physician, Ludy Barrios MD.      ---------------------------------------------------------------------------------------------------------------------   Review of Systems   Constitutional:  Positive for chills and fatigue. Negative for fever.   HENT: Negative.     Respiratory:  Negative for cough, chest tightness and wheezing.         + pain with breathing   Cardiovascular: Negative.  Negative for chest pain.   Gastrointestinal: Negative.  Negative for abdominal pain, nausea and vomiting.   Endocrine: Negative.    Genitourinary: Negative.  Negative for dysuria.   Musculoskeletal:  Positive for arthralgias, back pain and gait problem.   Skin: Negative.    Neurological:  Positive for weakness (Generalized). Negative for dizziness, tremors, seizures, syncope, facial asymmetry, speech difficulty, light-headedness, numbness and headaches.   Psychiatric/Behavioral: Negative.     All other systems reviewed and are negative.     ---------------------------------------------------------------------------------------------------------------------   Past Medical History:   Diagnosis Date    Anemia     Arthritis     Blood clot in abdominal vein     BPH (benign  prostatic hypertrophy)     Cerebellar infarct 2/5/2024    CHF (congestive heart failure)     Chronic kidney disease     Colon polyp     Depression     Diabetes     Disease of thyroid gland     Diverticulitis     Elevated PSA     GERD (gastroesophageal reflux disease)     GERD (gastroesophageal reflux disease)     H/O blood clots     History of transfusion     Hypercholesterolemia 8/9/2024    Hyperlipidemia     Hypertension     MI (myocardial infarction)     Migraines      Past Surgical History:   Procedure Laterality Date    CARDIAC CATHETERIZATION  01/31/2012    Normal Coronaries. EF 55%. few PVC    CARDIAC CATHETERIZATION  06/02/2005    30% LAD. Myocardial Bridging    CARDIAC CATHETERIZATION  12/07/2008    Normal Coronaries    CARDIOVASCULAR STRESS TEST  09/14/2015    5 Min.31 Secs. 7.0 METS. 99% THR. BP- 136/70. EF 58%. Negative.    CARDIOVASCULAR STRESS TEST  06/21/2022    Lexiscan- EF 57%. BP- 165/72. R/O Apical Ischemia    CIRCUMCISION N/A 01/31/2020    Procedure: CIRCUMCISION;  Surgeon: Lalo Camejo MD;  Location: [facility];  Service: Urology    COLONOSCOPY N/A 08/25/2021    COLONOSCOPY- Melvina Martinez MD    CONVERTED (HISTORICAL) HOLTER  06/08/2013    Avg 73. . Rare PAC. 3% Artifacts    CONVERTED (HISTORICAL) HOLTER  06/22/2022    7 Days. Avg 66. . 5 SVT    CONVERTED (HISTORICAL) HOLTER  08/10/2023    5 Days. Avg 64. . Rare PAC & PVC    ECHO - CONVERTED  09/14/2015    EF 60%. Trace MR. RVSP- 35 mmHg    ECHO - CONVERTED  06/21/2022    EF 55%. LA- 4.1. Mild MR & AI. RVSP- 47 mmHg    ECHO - CONVERTED  08/14/2023    EF 60%. LA- 3.8. Mild MR. RVSP- 38 mmHg. No shunt    ENDOSCOPY N/A 08/25/2021    EGD- Melvina Martinez    EXTRACORPOREAL SHOCK WAVE LITHOTRIPSY (ESWL) Left 01/31/2020    LITHOTRIPSY;  Surgeon: Lalo Camejo    OTHER SURGICAL HISTORY  01/08/2015    Pulse O2- Abnormal     Family History   Problem Relation Age of Onset    Heart disease Mother      Heart failure Mother     Heart attack Father     Heart disease Father     Stroke Brother     Aneurysm Brother     Heart disease Brother      Social History     Socioeconomic History    Marital status:    Tobacco Use    Smoking status: Former     Current packs/day: 0.00     Average packs/day: 1 pack/day for 50.0 years (50.0 ttl pk-yrs)     Types: Pipe, Cigarettes     Start date:      Quit date:      Years since quittin.4     Passive exposure: Past    Smokeless tobacco: Never   Vaping Use    Vaping status: Never Used   Substance and Sexual Activity    Alcohol use: Not Currently     Comment: Quit in     Drug use: Not Currently     Types: Marijuana     Comment: Quit in     Sexual activity: Defer     ---------------------------------------------------------------------------------------------------------------------   Allergies:  Other and Reglan [metoclopramide]  ---------------------------------------------------------------------------------------------------------------------   Home medications:    Medications below are reported home medications pulling from within the system; at this time, these medications have not been reconciled unless otherwise specified and are in the verification process for further verifcation as current home medications.  Medications Prior to Admission   Medication Sig Dispense Refill Last Dose/Taking    aspirin 81 MG EC tablet Take 1 tablet by mouth Daily.   Past Week    oxyCODONE-acetaminophen (PERCOCET) 5-325 MG per tablet Take 1 tablet by mouth Every 6 (Six) Hours As Needed for Severe Pain.   Unknown       Hospital Scheduled Meds:  Lidocaine, 1 patch, Transdermal, Q24H  senna-docusate sodium, 2 tablet, Oral, BID  sodium chloride, 10 mL, Intravenous, Q12H           Current listed hospital scheduled medications may not yet reflect those currently placed in orders that are signed and held awaiting patient's arrival to floor.    ---------------------------------------------------------------------------------------------------------------------     Objective     Vital Signs:  Temp:  [98.3 °F (36.8 °C)-98.5 °F (36.9 °C)] 98.5 °F (36.9 °C)  Heart Rate:  [58-82] 75  Resp:  [14-18] 18  BP: (134-180)/(58-87) 164/76      05/30/25  1541 05/31/25  0036   Weight: 72.6 kg (160 lb) 72.3 kg (159 lb 4.8 oz)     Body mass index is 24.22 kg/m².  ---------------------------------------------------------------------------------------------------------------------       Physical Exam  Vitals and nursing note reviewed.   Constitutional:       General: He is awake. He is not in acute distress.     Appearance: He is normal weight. He is ill-appearing. He is not diaphoretic.      Comments: Room air at the time of my evaluation.   HENT:      Head: Normocephalic and atraumatic.      Right Ear: External ear normal.      Left Ear: External ear normal.      Nose: Nose normal.   Eyes:      Conjunctiva/sclera: Conjunctivae normal.      Pupils: Pupils are equal, round, and reactive to light.   Neck:      Trachea: No tracheal deviation.   Cardiovascular:      Rate and Rhythm: Normal rate and regular rhythm.      Heart sounds: Normal heart sounds. No murmur heard.  Pulmonary:      Effort: Pulmonary effort is normal. No respiratory distress.      Breath sounds: Normal breath sounds. No wheezing.   Abdominal:      General: Bowel sounds are normal.      Palpations: Abdomen is soft.      Tenderness: There is no abdominal tenderness.   Musculoskeletal:         General: No deformity. Normal range of motion.      Cervical back: Normal range of motion and neck supple.   Skin:     General: Skin is warm and dry.      Capillary Refill: Capillary refill takes 2 to 3 seconds.      Coloration: Skin is pale.      Findings: No erythema or rash.   Neurological:      General: No focal deficit present.      Mental Status: He is alert and oriented to person, place, and time.      Cranial  "Nerves: No cranial nerve deficit.      Sensory: No sensory deficit.      Motor: Weakness (generalized) present. No tremor or seizure activity.      Comments: Strength equal bilaterally in upper and lower extremities.   Psychiatric:         Attention and Perception: Attention normal.         Mood and Affect: Mood normal.         Speech: Speech normal.         Behavior: Behavior normal. Behavior is cooperative.         Thought Content: Thought content normal.         Cognition and Memory: Cognition normal. Memory is impaired.      Comments: Spouse reports that patient does have \"short-term memory loss\".  He is currently alert and oriented to self, place, and time.  Able to participate in logical conversation.       ---------------------------------------------------------------------------------------------------------------------  EKG: Pending formal cardiology interpretation.  Per my review, appears normal sinus rhythm in the 70s.  No evidence of acute ischemia.    ---------------------------------------------------------------------------------------------------------------------   Results from last 7 days   Lab Units 05/30/25  1714   WBC 10*3/mm3 4.63   HEMOGLOBIN g/dL 11.5*   HEMATOCRIT % 36.8*   MCV fL 87.2   MCHC g/dL 31.3*   PLATELETS 10*3/mm3 148   INR  0.99     Results from last 7 days   Lab Units 05/30/25  1824   PH, ARTERIAL pH units 7.406   PO2 ART mm Hg 55.8*   PCO2, ARTERIAL mm Hg 48.9*   HCO3 ART mmol/L 30.7*     Results from last 7 days   Lab Units 05/30/25  1714   SODIUM mmol/L 138   POTASSIUM mmol/L 4.5   CHLORIDE mmol/L 100   CO2 mmol/L 29.3*   BUN mg/dL 22.6   CREATININE mg/dL 0.92   CALCIUM mg/dL 9.1   GLUCOSE mg/dL 269*   ALBUMIN g/dL 3.9   BILIRUBIN mg/dL 0.4   ALK PHOS U/L 87   AST (SGOT) U/L 23   ALT (SGPT) U/L 22   Estimated Creatinine Clearance: 69.9 mL/min (by C-G formula based on SCr of 0.92 mg/dL).  No results found for: \"AMMONIA\"  Results from last 7 days   Lab Units 05/30/25  9787 " "05/30/25  1714   HSTROP T ng/L 9 8     Results from last 7 days   Lab Units 05/30/25  1714   PROBNP pg/mL 77.8     Lab Results   Component Value Date    HGBA1C 8.90 (H) 05/30/2025     Lab Results   Component Value Date    TSH 2.780 05/30/2025    FREET4 1.02 05/30/2025     No results found for: \"PREGTESTUR\", \"PREGSERUM\", \"HCG\", \"HCGQUANT\"  Pain Management Panel           No data to display                  ---------------------------------------------------------------------------------------------------------------------  Imaging Results (Last 7 Days)       Procedure Component Value Units Date/Time    CT Angiogram Chest Pulmonary Embolism [[id number]] Collected: 05/30/25 2214     Updated: 05/30/25 2219    Narrative:      PROCEDURE: CT angiography of the chest performed with IV contrast on May  30, 2025. The examination was performed with 2 mm axial imaging and 2 mm  sagittal and coronal reconstruction images. The patient was administered  Isovue 370 contrast IV without complication.     HISTORY: Hypoxia.     COMPARISON: None.     FINDINGS:     Enlarged heart size.  Bronchial inflammation.  No thoracic aortic aneurysm or dissection.  No pulmonary embolus.  Minimal atelectasis at the lung bases.  Mild atelectasis in the right middle lobe and lower lingula.  Emphysematous changes in the upper lobes.  No pneumothorax.  Calcified granuloma in the lateral aspect of the left upper lobe.  Small size hiatal hernia.  Coronary arterial vascular calcifications.  Normal thyroid gland.  Cholecystectomy.  Broad-based ventral midline hernia in the anterior abdominal wall  contains fat and a partial anterior wall segment of the transverse  colon.  Prior fracture across the base of the right L1 transverse process with  distraction of the fractured components.       Impression:         No thoracic aortic aneurysm or dissection.  No pulmonary embolus.  Emphysematous changes in the upper lobes.  Bronchial inflammation.  Normal thyroid " gland.  Mild enlarged heart size  Small size hiatal hernia.  Atelectasis at the lung bases including the right middle lobe and lower  lingula.  Cholecystectomy.  No free fluid or free air in the upper abdomen.        This report was finalized on 5/30/2025 10:17 PM by Manan Rankin MD.       XR Chest 1 View [[id number]] Collected: 05/30/25 2111     Updated: 05/30/25 2114    Narrative:      PROCEDURE: Portable chest x-ray examination performed on May 30, 2025.  Single view.     HISTORY: Fall. Back pain.     COMPARISON: None.     FINDINGS:     Enlarged heart size  Hypoinflated lungs  Mild atelectasis at the lung bases, left greater than right.  No pleural effusion or pneumothorax.  No fracture or foreign body.  Cholecystectomy clips in the right upper quadrant.       Impression:         Enlarged heart size.  Mild atelectasis at the lung bases, left greater than right.  No pleural effusion or pneumothorax  No fracture or foreign body.  Cholecystectomy.     This report was finalized on 5/30/2025 9:12 PM by Manan Rankin MD.       CT Thoracic Spine Without Contrast [[id number]] Collected: 05/30/25 1853     Updated: 05/30/25 1855    Narrative:      Comparison: None     Mild atelectasis lung bases bilaterally. No acute fracture or  subluxation is seen. No significant spinal canal or foraminal stenosis.  Mild chronic compression deformities noted.       Impression:      Impression:  1. No acute osseous abnormality.      This report was finalized on 5/30/2025 6:53 PM by Conrado Worthington DO.       CT Lumbar Spine Without Contrast [[id number]] Collected: 05/30/25 1853     Updated: 05/30/25 1855    Narrative:      Comparison: None     Acute fractures right L1 and L2 transverse processes. Vertebral body  height is maintained. No significant spinal canal or foraminal stenosis.       Impression:      Impression:  1. Acute fractures right L1 and L2 transverse processes.     This report was finalized on 5/30/2025 6:53 PM by Conrado  DO Elin.       CT Cervical Spine Without Contrast [[id number]] Collected: 05/30/25 1853     Updated: 05/30/25 1855    Narrative:      Comparison: None     No fracture, dislocation or subluxation is identified. Small disc  herniation C3-C4. Mild multilevel degenerative changes. The  craniocervical junction appears intact. The prevertebral soft tissues  are unremarkable. Loss of the normal cervical lordosis may be from  patient positioning and/or muscle spasm.       Impression:      Impression:  1. No acute fracture     This report was finalized on 5/30/2025 6:53 PM by Conrado Worthington DO.       CT Head Without Contrast [[id number]] Collected: 05/30/25 1852     Updated: 05/30/25 1855    Narrative:      Comparison: None     The ventricles are symmetric and normal in appearance without evidence  of hydrocephalus. No midline shift, mass effect or intracranial  hemorrhage is identified. No abnormal extraaxial fluid collection is  seen. Scattered low attenuation areas within the deep white matter  suggestive of chronic microvascular changes. Global cerebral atrophy is  noted. Chronic infarct left cerebellar hemisphere.       Impression:      1.  No acute intracranial abnormality.     This report was finalized on 5/30/2025 6:53 PM by Conrado Worthington DO.       XR Hip With or Without Pelvis 2 - 3 View Right [[id number]] Collected: 05/30/25 1656     Updated: 05/30/25 1658    Narrative:      EXAM:    XR Right Hip With Pelvis When Performed, 2 or 3 Views     EXAM DATE:    5/30/2025 4:51 PM     CLINICAL HISTORY:    fall     TECHNIQUE:    Two or three views of the right hip with pelvis when performed.     COMPARISON:    No relevant prior studies available.     FINDINGS:    Bones/joints:  Unremarkable.  No acute fracture.  No dislocation.    Soft tissues:  Unremarkable.       Impression:        No acute findings in the right hip.     This report was finalized on 5/30/2025 4:56 PM by Dr. Norbert Boles MD.             Last  echocardiogram:  Results for orders placed during the hospital encounter of 08/14/23    Adult Transthoracic Echo Complete W/ Cont if Necessary Per Protocol    Interpretation Summary    Sinus bradycardia was the predominant rhythm observed during the procedure.    Left ventricular wall thickness is consistent with mild concentric hypertrophy.    Left ventricular systolic function is normal. Left ventricular ejection fraction appears to be 56 - 60%.    Left ventricular diastolic function is consistent with (grade Ia w/high LAP) impaired relaxation.    The left atrial cavity is borderline dilated  3.8 cm    There is mild calcification of the aortic valve.  Without any significant stenosis    Mild mitral valve regurgitation is present.    Mild to moderate tricuspid valve regurgitation is present.  RVSP- 38 mmHg    Saline test results are negative.    I have personally reviewed the above radiology images and read the final radiology report on 05/31/25  ---------------------------------------------------------------------------------------------------------------------  Assessment / Plan     Active Hospital Problems    Diagnosis  POA    **Uncontrolled pain [R52]  Yes       ASSESSMENT/PLAN:  #Acute fractures of the right L1 and L2 transverse processes as well as reported right posterior 11th rib fracture status post mechanical fall on 5/27/2025  #Physical debility and uncontrolled pain, due to above  #Acute hypoxemic respiratory failure with PO2 in the 50s on ABG obtained in ED, likely due to inability to adequately take deep breaths in setting of pain  --CT head without contrast obtained in the ED noted no acute intracranial abnormality.  --CT of the cervical, thoracic, and lumbar spine without contrast noted acute fractures of the right L1 and L2 transverse processes.  --CT angiogram chest noted no evidence of PE, pneumothorax, or pneumonia.  Also did not mention rib fracture (details of rib fracture obtained from chart  review).  --Lidoderm patch ordered.  --As needed Tylenol for mild pain.  --As needed Roxicodone 5 mg every 6 hours for moderate to severe pain.  Adjust as necessary.  --Zofran available as needed for nausea or vomiting.  --Scheduled bowel regimen on board to prevent constipation.  --Encourage incentive spirometry use and turn, cough, and deep breathing exercises.  --Apply/titrate supplemental O2 as necessary to maintain SpO2 saturations greater than 90%.  Continuous pulse oximetry ordered.  --Encourage activity.  Up with assistance as tolerated.  PT/OT consults placed.  --Case management consulted for assistance with discharge plan/placement.  Greatly appreciate their assistance.    Chronic:  #Anemia; hemoglobin 11.5, appearing near patient's baseline.  No signs of bleeding on exam.  Continue to closely monitor hemoglobin.  Repeat CBC in the AM.  Plan to transfuse PRBCs for hemoglobin less than 7.  #Primary hypertension; BP moderately elevated throughout ED course with systolic 140s to 170s.  Review home antihypertensive regimen once reconciled by pharmacy with plans to continue.  Holding parameters to prevent hypotension and/or bradycardia.  #Hypercholesterolemia/hyperlipidemia; does not appear that patient is taking a statin at home.  Per allergies listed, patient has a statin intolerance.  #Acquired hypothyroidism; check TSH and free T4.  Does not appear that patient is taking levothyroxine at home.  Place patient on levothyroxine as indicated pending laboratory results.  #Chronic mesenteric ischemia and history of blood clot in abdominal vein; patient states that he takes 81 mg aspirin daily.  Continue.  #History of cerebellar infarct; continue 81 mg aspirin daily (as noted above).  #BPH; currently no evidence of urinary retention.  Bladder scan/straight cath as needed per nursing protocol.  #Type 2 diabetes mellitus; obtain hemoglobin A1c to evaluate glycemic control.  Accu-Cheks AC and at bedtime with sliding  scale insulin.  Hypoglycemia protocol in place if necessary.  #Peripheral neuropathy; supportive care.  #Former cigarette smoker; encourage continued cessation.  #History of migraines; supportive care.  Tylenol available as needed for mild pain or headache (as noted above).  #GERD; reflux precautions.  #Depression; supportive care.      ----------  -DVT prophylaxis: SCDs.  -Activity: Up with assistance as tolerated. Maintain fall precautions.   -Expected length of stay:   INPATIENT status due to the need for care which can only be reasonably provided in an hospital setting such as aggressive/expedited ancillary services and/or consultation services, the necessity for IV medications, close physician monitoring and/or the possible need for procedures.  In such, I feel patient's risk for adverse outcomes and need for care warrant INPATIENT evaluation and predict the patient's care encounter to likely last beyond 2 midnights.   -Disposition pending clinical course; agreeable to SNF for physical rehab.     High risk secondary to acute fractures of the right L1 and L2 transverse processes as well as reported right posterior 11th rib fracture status post mechanical fall on 5/27/2025, physical debility and uncontrolled pain, acute hypoxemic respiratory failure with PO2 in the 50s on ABG obtained in the ED likely due to inability to adequately take deep breaths in setting of pain.    CODE STATUS: FULL CODE, discussed with patient and spouse at bedside in Room [address] on [address] [place] [address].       CRISTOPHER Michelle   05/31/25  01:32 EDT  Pager #[phone]  ---------------------------------------------------------------------------------------------------------------------

## 2025-05-31 NOTE — PLAN OF CARE
Goal Outcome Evaluation:  Plan of Care Reviewed With: patient           Outcome Evaluation: Patient arrived to the floor this shift. PRN medication given for pain. VSS on RA. no acute changes noted at this time. will continue POC.

## 2025-05-31 NOTE — ED PROVIDER NOTES
Subjective   History of Present Illness  75-year-old male patient with a past medical history of CHF, diabetes, thyroid disease, GERD, hyperlipidemia presents to the emergency room today with complaints of increased back pain and unable to ambulate at home.  Wife states that she is not able to care for him.  She states that he cannot even get up and go to the bathroom.  She states that he fell on Tuesday night and he was taken to [facility] where he was diagnosed with a fracture in his back.  She states that since he has been home she has been having to take care of him and she can no longer do it.  She states that she brought him here today in hopes of finding rehab placement.    History provided by:  Patient   used: No        Review of Systems   Constitutional:  Positive for activity change.   HENT: Negative.     Eyes: Negative.    Respiratory: Negative.     Cardiovascular: Negative.    Gastrointestinal: Negative.    Endocrine: Negative.    Genitourinary: Negative.    Musculoskeletal:  Positive for back pain.   Skin: Negative.    Allergic/Immunologic: Negative.    Neurological: Negative.    Hematological: Negative.    Psychiatric/Behavioral: Negative.     All other systems reviewed and are negative.      Past Medical History:   Diagnosis Date    Anemia     Arthritis     Blood clot in abdominal vein     BPH (benign prostatic hypertrophy)     Cerebellar infarct 2/5/2024    CHF (congestive heart failure)     Chronic kidney disease     Colon polyp     Depression     Diabetes     Disease of thyroid gland     Diverticulitis     Elevated PSA     GERD (gastroesophageal reflux disease)     GERD (gastroesophageal reflux disease)     H/O blood clots     History of transfusion     Hypercholesterolemia 8/9/2024    Hyperlipidemia     Hypertension     MI (myocardial infarction)     Migraines        Allergies   Allergen Reactions    Other Headache     Statin intolerant, has tried all statins and unable  to take due to causing severe headaches.    Reglan [Metoclopramide] Rash     In mouth and throat       Past Surgical History:   Procedure Laterality Date    CARDIAC CATHETERIZATION  01/31/2012    Normal Coronaries. EF 55%. few PVC    CARDIAC CATHETERIZATION  06/02/2005    30% LAD. Myocardial Bridging    CARDIAC CATHETERIZATION  12/07/2008    Normal Coronaries    CARDIOVASCULAR STRESS TEST  09/14/2015    5 Min.31 Secs. 7.0 METS. 99% THR. BP- 136/70. EF 58%. Negative.    CARDIOVASCULAR STRESS TEST  06/21/2022    Lexiscan- EF 57%. BP- 165/72. R/O Apical Ischemia    CIRCUMCISION N/A 01/31/2020    Procedure: CIRCUMCISION;  Surgeon: Lalo Camejo MD;  Location: [facility] OR;  Service: Urology    COLONOSCOPY N/A 08/25/2021    COLONOSCOPY- Melvina Martinez MD    CONVERTED (HISTORICAL) HOLTER  06/08/2013    Avg 73. . Rare PAC. 3% Artifacts    CONVERTED (HISTORICAL) HOLTER  06/22/2022    7 Days. Avg 66. . 5 SVT    CONVERTED (HISTORICAL) HOLTER  08/10/2023    5 Days. Avg 64. . Rare PAC & PVC    ECHO - CONVERTED  09/14/2015    EF 60%. Trace MR. RVSP- 35 mmHg    ECHO - CONVERTED  06/21/2022    EF 55%. LA- 4.1. Mild MR & AI. RVSP- 47 mmHg    ECHO - CONVERTED  08/14/2023    EF 60%. LA- 3.8. Mild MR. RVSP- 38 mmHg. No shunt    ENDOSCOPY N/A 08/25/2021    EGD- Melvina Martinez    EXTRACORPOREAL SHOCK WAVE LITHOTRIPSY (ESWL) Left 01/31/2020    LITHOTRIPSY;  Surgeon: Lalo Camejo    OTHER SURGICAL HISTORY  01/08/2015    Pulse O2- Abnormal       Family History   Problem Relation Age of Onset    Heart disease Mother     Heart failure Mother     Heart attack Father     Heart disease Father     Stroke Brother     Aneurysm Brother     Heart disease Brother        Social History     Socioeconomic History    Marital status:    Tobacco Use    Smoking status: Former     Current packs/day: 0.00     Average packs/day: 1 pack/day for 50.0 years (50.0 ttl pk-yrs)     Types: Pipe, Cigarettes      Start date:      Quit date: 2008     Years since quittin.4     Passive exposure: Past    Smokeless tobacco: Never   Vaping Use    Vaping status: Never Used   Substance and Sexual Activity    Alcohol use: Not Currently     Comment: Quit in     Drug use: Not Currently     Types: Marijuana     Comment: Quit in     Sexual activity: Defer           Objective   Physical Exam  Vitals and nursing note reviewed.   Constitutional:       Appearance: Normal appearance. He is normal weight. He is ill-appearing.   HENT:      Head: Normocephalic and atraumatic.      Right Ear: External ear normal.      Left Ear: External ear normal.      Nose: Nose normal.      Mouth/Throat:      Mouth: Mucous membranes are moist.      Pharynx: Oropharynx is clear.   Eyes:      Extraocular Movements: Extraocular movements intact.      Conjunctiva/sclera: Conjunctivae normal.      Pupils: Pupils are equal, round, and reactive to light.   Cardiovascular:      Rate and Rhythm: Normal rate and regular rhythm.      Pulses: Normal pulses.      Heart sounds: Normal heart sounds.   Pulmonary:      Effort: Pulmonary effort is normal.      Breath sounds: Normal breath sounds.   Abdominal:      General: Abdomen is flat. Bowel sounds are normal.      Palpations: Abdomen is soft.   Musculoskeletal:         General: Tenderness present. Normal range of motion.      Cervical back: Normal range of motion and neck supple.   Skin:     General: Skin is warm and dry.      Capillary Refill: Capillary refill takes less than 2 seconds.   Neurological:      General: No focal deficit present.      Mental Status: He is alert and oriented to person, place, and time. Mental status is at baseline.   Psychiatric:         Mood and Affect: Mood normal.         Behavior: Behavior normal.         Thought Content: Thought content normal.         Judgment: Judgment normal.         Procedures           ED Course  ED Course as of 25 1421   Fri May 30, 2025    1701 XR Hip With or Without Pelvis 2 - 3 View Right [ML]   1813 WBC: 4.63 [ML]   1813 PTT: 28.9 [ML]   1813 Lipase: 52 [ML]   1813 proBNP: 77.8 [ML]   1813 HS Troponin T: 8 [ML]   1905 CT Thoracic Spine Without Contrast [ML]   1905 CT Lumbar Spine Without Contrast [ML]   1906 CT Cervical Spine Without Contrast [ML]   1906 CT Head Without Contrast [ML]   2209 XR Chest 1 View [ML]   2241 Paged hospitalist  [ML]   2305 Sign out to Flippen  [ML]      ED Course User Index  [ML] Irais Castaneda PA                                             Results for orders placed or performed during the hospital encounter of 05/30/25   Comprehensive Metabolic Panel    Collection Time: 05/30/25  5:14 PM    Specimen: Blood   Result Value Ref Range    Glucose 269 (H) 65 - 99 mg/dL    BUN 22.6 8.0 - 23.0 mg/dL    Creatinine 0.92 0.76 - 1.27 mg/dL    Sodium 138 136 - 145 mmol/L    Potassium 4.5 3.5 - 5.2 mmol/L    Chloride 100 98 - 107 mmol/L    CO2 29.3 (H) 22.0 - 29.0 mmol/L    Calcium 9.1 8.6 - 10.5 mg/dL    Total Protein 7.1 6.0 - 8.5 g/dL    Albumin 3.9 3.5 - 5.2 g/dL    ALT (SGPT) 22 1 - 41 U/L    AST (SGOT) 23 1 - 40 U/L    Alkaline Phosphatase 87 39 - 117 U/L    Total Bilirubin 0.4 0.0 - 1.2 mg/dL    Globulin 3.2 gm/dL    A/G Ratio 1.2 g/dL    BUN/Creatinine Ratio 24.6 7.0 - 25.0    Anion Gap 8.7 5.0 - 15.0 mmol/L    eGFR 86.2 >60.0 mL/min/1.73   Lipase    Collection Time: 05/30/25  5:14 PM    Specimen: Blood   Result Value Ref Range    Lipase 52 13 - 60 U/L   Protime-INR    Collection Time: 05/30/25  5:14 PM    Specimen: Blood   Result Value Ref Range    Protime 13.7 12.5 - 15.2 Seconds    INR 0.99 0.90 - 1.10   aPTT    Collection Time: 05/30/25  5:14 PM    Specimen: Blood   Result Value Ref Range    PTT 28.9 24.5 - 35.9 seconds   CBC Auto Differential    Collection Time: 05/30/25  5:14 PM    Specimen: Blood   Result Value Ref Range    WBC 4.63 3.40 - 10.80 10*3/mm3    RBC 4.22 4.14 - 5.80 10*6/mm3    Hemoglobin 11.5 (L)  13.0 - 17.7 g/dL    Hematocrit 36.8 (L) 37.5 - 51.0 %    MCV 87.2 79.0 - 97.0 fL    MCH 27.3 26.6 - 33.0 pg    MCHC 31.3 (L) 31.5 - 35.7 g/dL    RDW 14.2 12.3 - 15.4 %    RDW-SD 45.2 37.0 - 54.0 fl    MPV 10.0 6.0 - 12.0 fL    Platelets 148 140 - 450 10*3/mm3    Neutrophil % 61.0 42.7 - 76.0 %    Lymphocyte % 26.1 19.6 - 45.3 %    Monocyte % 10.8 5.0 - 12.0 %    Eosinophil % 1.5 0.3 - 6.2 %    Basophil % 0.4 0.0 - 1.5 %    Immature Grans % 0.2 0.0 - 0.5 %    Neutrophils, Absolute 2.82 1.70 - 7.00 10*3/mm3    Lymphocytes, Absolute 1.21 0.70 - 3.10 10*3/mm3    Monocytes, Absolute 0.50 0.10 - 0.90 10*3/mm3    Eosinophils, Absolute 0.07 0.00 - 0.40 10*3/mm3    Basophils, Absolute 0.02 0.00 - 0.20 10*3/mm3    Immature Grans, Absolute 0.01 0.00 - 0.05 10*3/mm3    nRBC 0.0 0.0 - 0.2 /100 WBC   High Sensitivity Troponin T    Collection Time: 05/30/25  5:14 PM    Specimen: Blood   Result Value Ref Range    HS Troponin T 8 <22 ng/L   BNP    Collection Time: 05/30/25  5:14 PM    Specimen: Blood   Result Value Ref Range    proBNP 77.8 0.0 - 1,800.0 pg/mL   Hemoglobin A1c    Collection Time: 05/30/25  5:14 PM    Specimen: Blood   Result Value Ref Range    Hemoglobin A1C 8.90 (H) 4.80 - 5.60 %   Green Top (Gel)    Collection Time: 05/30/25  5:14 PM   Result Value Ref Range    Extra Tube Hold for add-ons.    Lavender Top    Collection Time: 05/30/25  5:14 PM   Result Value Ref Range    Extra Tube hold for add-on    Gold Top - SST    Collection Time: 05/30/25  5:14 PM   Result Value Ref Range    Extra Tube Hold for add-ons.    Light Blue Top    Collection Time: 05/30/25  5:14 PM   Result Value Ref Range    Extra Tube Hold for add-ons.    ECG 12 Lead Dyspnea    Collection Time: 05/30/25  5:46 PM   Result Value Ref Range    QT Interval 362 ms    QTC Interval 409 ms   Blood Gas, Arterial With Co-Ox    Collection Time: 05/30/25  6:24 PM    Specimen: Arterial Blood   Result Value Ref Range    Site Right Radial     Ricardo's Test Positive      pH, Arterial 7.406 7.350 - 7.450 pH units    pCO2, Arterial 48.9 (H) 35.0 - 45.0 mm Hg    pO2, Arterial 55.8 (L) 83.0 - 108.0 mm Hg    HCO3, Arterial 30.7 (H) 20.0 - 26.0 mmol/L    Base Excess, Arterial 5.0 (H) 0.0 - 2.0 mmol/L    O2 Saturation, Arterial 90.0 (L) 94.0 - 99.0 %    Hemoglobin, Blood Gas 12.1 (L) 14 - 18 g/dL    Hematocrit, Blood Gas 37.1 (L) 38.0 - 51.0 %    Oxyhemoglobin 87.8 (L) 94 - 99 %    Methemoglobin 0.40 0.00 - 3.00 %    Carboxyhemoglobin 2.0 0 - 5 %    A-a DO2 30.7 0.0 - 300.0 mmHg    CO2 Content 32.2 22 - 33 mmol/L    Barometric Pressure for Blood Gas 722 mmHg    Modality Room Air     FIO2 21 %    Ventilator Mode NA     Collected by [id number]     pH, Temp Corrected      pCO2, Temperature Corrected      pO2, Temperature Corrected     High Sensitivity Troponin T 1Hr    Collection Time: 05/30/25  6:29 PM    Specimen: Arm, Left; Blood   Result Value Ref Range    HS Troponin T 9 <22 ng/L    Troponin T Numeric Delta 1 Abnormal if >/=3 ng/L   TSH    Collection Time: 05/30/25  6:29 PM    Specimen: Arm, Left; Blood   Result Value Ref Range    TSH 2.780 0.270 - 4.200 uIU/mL   T4, Free    Collection Time: 05/30/25  6:29 PM    Specimen: Arm, Left; Blood   Result Value Ref Range    Free T4 1.02 0.92 - 1.68 ng/dL   Urinalysis With Microscopic If Indicated (No Culture) - Urine, Clean Catch    Collection Time: 05/30/25  9:09 PM    Specimen: Urine, Clean Catch   Result Value Ref Range    Color, UA Yellow Yellow, Straw    Appearance, UA Clear Clear    pH, UA 6.0 5.0 - 8.0    Specific Gravity, UA >1.030 (H) 1.005 - 1.030    Glucose,  mg/dL (2+) (A) Negative    Ketones, UA Negative Negative    Bilirubin, UA Negative Negative    Blood, UA Negative Negative    Protein, UA Negative Negative    Leuk Esterase, UA Negative Negative    Nitrite, UA Negative Negative    Urobilinogen, UA 1.0 E.U./dL 0.2 - 1.0 E.U./dL   Urine Drug Screen - Urine, Clean Catch    Collection Time: 05/30/25  9:09 PM    Specimen: Urine,  Clean Catch   Result Value Ref Range    THC, Screen, Urine Negative Negative    Phencyclidine (PCP), Urine Negative Negative    Cocaine Screen, Urine Negative Negative    Methamphetamine, Ur Negative Negative    Opiate Screen Positive (A) Negative    Amphetamine Screen, Urine Negative Negative    Benzodiazepine Screen, Urine Negative Negative    Tricyclic Antidepressants Screen Negative Negative    Methadone Screen, Urine Negative Negative    Barbiturates Screen, Urine Negative Negative    Oxycodone Screen, Urine Positive (A) Negative    Buprenorphine, Screen, Urine Negative Negative   Fentanyl, Urine - Urine, Clean Catch    Collection Time: 05/30/25  9:09 PM    Specimen: Urine, Clean Catch   Result Value Ref Range    Fentanyl, Urine Negative Negative   CBC Auto Differential    Collection Time: 05/31/25  2:54 AM    Specimen: Blood   Result Value Ref Range    WBC 5.04 3.40 - 10.80 10*3/mm3    RBC 4.30 4.14 - 5.80 10*6/mm3    Hemoglobin 11.7 (L) 13.0 - 17.7 g/dL    Hematocrit 37.6 37.5 - 51.0 %    MCV 87.4 79.0 - 97.0 fL    MCH 27.2 26.6 - 33.0 pg    MCHC 31.1 (L) 31.5 - 35.7 g/dL    RDW 14.0 12.3 - 15.4 %    RDW-SD 44.9 37.0 - 54.0 fl    MPV 9.7 6.0 - 12.0 fL    Platelets 133 (L) 140 - 450 10*3/mm3    Neutrophil % 64.5 42.7 - 76.0 %    Lymphocyte % 22.8 19.6 - 45.3 %    Monocyte % 10.7 5.0 - 12.0 %    Eosinophil % 1.6 0.3 - 6.2 %    Basophil % 0.2 0.0 - 1.5 %    Immature Grans % 0.2 0.0 - 0.5 %    Neutrophils, Absolute 3.25 1.70 - 7.00 10*3/mm3    Lymphocytes, Absolute 1.15 0.70 - 3.10 10*3/mm3    Monocytes, Absolute 0.54 0.10 - 0.90 10*3/mm3    Eosinophils, Absolute 0.08 0.00 - 0.40 10*3/mm3    Basophils, Absolute 0.01 0.00 - 0.20 10*3/mm3    Immature Grans, Absolute 0.01 0.00 - 0.05 10*3/mm3    nRBC 0.0 0.0 - 0.2 /100 WBC   Comprehensive Metabolic Panel    Collection Time: 05/31/25  2:54 AM    Specimen: Blood   Result Value Ref Range    Glucose 173 (H) 65 - 99 mg/dL    BUN 17.3 8.0 - 23.0 mg/dL    Creatinine 0.72  (L) 0.76 - 1.27 mg/dL    Sodium 138 136 - 145 mmol/L    Potassium 4.0 3.5 - 5.2 mmol/L    Chloride 103 98 - 107 mmol/L    CO2 25.6 22.0 - 29.0 mmol/L    Calcium 8.7 8.6 - 10.5 mg/dL    Total Protein 6.9 6.0 - 8.5 g/dL    Albumin 3.6 3.5 - 5.2 g/dL    ALT (SGPT) 19 1 - 41 U/L    AST (SGOT) 23 1 - 40 U/L    Alkaline Phosphatase 85 39 - 117 U/L    Total Bilirubin 0.5 0.0 - 1.2 mg/dL    Globulin 3.3 gm/dL    A/G Ratio 1.1 g/dL    BUN/Creatinine Ratio 24.0 7.0 - 25.0    Anion Gap 9.4 5.0 - 15.0 mmol/L    eGFR 94.7 >60.0 mL/min/1.73               Medical Decision Making  Case was transitioned to me at change of shift.  The patient was admitted due to uncontrolled pain.  Case was discussed with Dr. Bonner who agreed to admission at this time.  All parties in agreement.    Amount and/or Complexity of Data Reviewed  Labs: ordered. Decision-making details documented in ED Course.  Radiology: ordered. Decision-making details documented in ED Course.  ECG/medicine tests: ordered.    Risk  Prescription drug management.  Decision regarding hospitalization.        Final diagnoses:   Uncontrolled pain       ED Disposition  ED Disposition       ED Disposition   Decision to Admit    Condition   --    Comment   Level of Care: Medical Telemetry [23]   Diagnosis: Uncontrolled pain [872638]   Certification: I Certify That Inpatient Hospital Services Are Medically Necessary For Greater Than 2 Midnights                 No follow-up provider specified.       Medication List      No changes were made to your prescriptions during this visit.            Davy Parisi,   05/31/25 [id number]

## 2025-05-31 NOTE — PLAN OF CARE
Goal Outcome Evaluation:  Plan of Care Reviewed With: patient           Outcome Evaluation: Pt resting in bed at this time. VSS on room air. A&Ox4. pt c/o pain, PRN pain meds given per MAR. Pt stated he hadn't had a bowel movement in several days, bowel regimen in place per orders. Pt up to BSC this shift. Pt refused bath this shift. No acute changes or concerns at this time. Will continue with plan of care.

## 2025-06-01 LAB
ALBUMIN SERPL-MCNC: 3.8 G/DL (ref 3.5–5.2)
ALBUMIN/GLOB SERPL: 1.1 G/DL
ALP SERPL-CCNC: 91 U/L (ref 39–117)
ALT SERPL W P-5'-P-CCNC: 21 U/L (ref 1–41)
AMMONIA BLD-SCNC: 27 UMOL/L (ref 16–60)
AMPHET+METHAMPHET UR QL: NEGATIVE
AMPHETAMINES UR QL: NEGATIVE
ANION GAP SERPL CALCULATED.3IONS-SCNC: 9.2 MMOL/L (ref 5–15)
AST SERPL-CCNC: 26 U/L (ref 1–40)
ATMOSPHERIC PRESS: 721 MMHG
B PARAPERT DNA SPEC QL NAA+PROBE: NOT DETECTED
B PERT DNA SPEC QL NAA+PROBE: NOT DETECTED
BARBITURATES UR QL SCN: NEGATIVE
BASE EXCESS BLDV CALC-SCNC: 4.3 MMOL/L (ref 0–2)
BASOPHILS # BLD AUTO: 0.02 10*3/MM3 (ref 0–0.2)
BASOPHILS NFR BLD AUTO: 0.3 % (ref 0–1.5)
BDY SITE: ABNORMAL
BENZODIAZ UR QL SCN: NEGATIVE
BILIRUB SERPL-MCNC: 0.3 MG/DL (ref 0–1.2)
BILIRUB UR QL STRIP: NEGATIVE
BUN SERPL-MCNC: 20.3 MG/DL (ref 8–23)
BUN/CREAT SERPL: 21.8 (ref 7–25)
BUPRENORPHINE SERPL-MCNC: NEGATIVE NG/ML
C PNEUM DNA NPH QL NAA+NON-PROBE: NOT DETECTED
CALCIUM SPEC-SCNC: 9.2 MG/DL (ref 8.6–10.5)
CANNABINOIDS SERPL QL: NEGATIVE
CHLORIDE SERPL-SCNC: 101 MMOL/L (ref 98–107)
CLARITY UR: CLEAR
CO2 BLDA-SCNC: 33.2 MMOL/L (ref 22–33)
CO2 SERPL-SCNC: 27.8 MMOL/L (ref 22–29)
COCAINE UR QL: NEGATIVE
COHGB MFR BLD: 1.7 % (ref 0–5)
COLOR UR: YELLOW
CREAT SERPL-MCNC: 0.93 MG/DL (ref 0.76–1.27)
DEPRECATED RDW RBC AUTO: 44.7 FL (ref 37–54)
EGFRCR SERPLBLD CKD-EPI 2021: 85.1 ML/MIN/1.73
EOSINOPHIL # BLD AUTO: 0.08 10*3/MM3 (ref 0–0.4)
EOSINOPHIL NFR BLD AUTO: 1.4 % (ref 0.3–6.2)
ERYTHROCYTE [DISTWIDTH] IN BLOOD BY AUTOMATED COUNT: 14 % (ref 12.3–15.4)
FENTANYL UR-MCNC: NEGATIVE NG/ML
FLUAV SUBTYP SPEC NAA+PROBE: NOT DETECTED
FLUBV RNA ISLT QL NAA+PROBE: NOT DETECTED
GLOBULIN UR ELPH-MCNC: 3.5 GM/DL
GLUCOSE SERPL-MCNC: 183 MG/DL (ref 65–99)
GLUCOSE UR STRIP-MCNC: NEGATIVE MG/DL
HADV DNA SPEC NAA+PROBE: NOT DETECTED
HCO3 BLDV-SCNC: 31.4 MMOL/L (ref 22–28)
HCOV 229E RNA SPEC QL NAA+PROBE: NOT DETECTED
HCOV HKU1 RNA SPEC QL NAA+PROBE: NOT DETECTED
HCOV NL63 RNA SPEC QL NAA+PROBE: NOT DETECTED
HCOV OC43 RNA SPEC QL NAA+PROBE: NOT DETECTED
HCT VFR BLD AUTO: 38.6 % (ref 37.5–51)
HGB BLD-MCNC: 12 G/DL (ref 13–17.7)
HGB BLDA-MCNC: 12.4 G/DL (ref 14–18)
HGB UR QL STRIP.AUTO: NEGATIVE
HMPV RNA NPH QL NAA+NON-PROBE: NOT DETECTED
HPIV1 RNA ISLT QL NAA+PROBE: NOT DETECTED
HPIV2 RNA SPEC QL NAA+PROBE: NOT DETECTED
HPIV3 RNA NPH QL NAA+PROBE: NOT DETECTED
HPIV4 P GENE NPH QL NAA+PROBE: NOT DETECTED
IMM GRANULOCYTES # BLD AUTO: 0.02 10*3/MM3 (ref 0–0.05)
IMM GRANULOCYTES NFR BLD AUTO: 0.3 % (ref 0–0.5)
INHALED O2 CONCENTRATION: 21 %
INR PPP: 0.98 (ref 0.9–1.1)
KETONES UR QL STRIP: ABNORMAL
LEUKOCYTE ESTERASE UR QL STRIP.AUTO: NEGATIVE
LYMPHOCYTES # BLD AUTO: 1.33 10*3/MM3 (ref 0.7–3.1)
LYMPHOCYTES NFR BLD AUTO: 23 % (ref 19.6–45.3)
Lab: ABNORMAL
M PNEUMO IGG SER IA-ACNC: NOT DETECTED
MAGNESIUM SERPL-MCNC: 1.8 MG/DL (ref 1.6–2.4)
MCH RBC QN AUTO: 27.1 PG (ref 26.6–33)
MCHC RBC AUTO-ENTMCNC: 31.1 G/DL (ref 31.5–35.7)
MCV RBC AUTO: 87.3 FL (ref 79–97)
METHADONE UR QL SCN: NEGATIVE
METHGB BLD QL: 0.6 % (ref 0–3)
MODALITY: ABNORMAL
MONOCYTES # BLD AUTO: 0.52 10*3/MM3 (ref 0.1–0.9)
MONOCYTES NFR BLD AUTO: 9 % (ref 5–12)
NEUTROPHILS NFR BLD AUTO: 3.82 10*3/MM3 (ref 1.7–7)
NEUTROPHILS NFR BLD AUTO: 66 % (ref 42.7–76)
NITRITE UR QL STRIP: NEGATIVE
NRBC BLD AUTO-RTO: 0 /100 WBC (ref 0–0.2)
OPIATES UR QL: POSITIVE
OXYCODONE UR QL SCN: POSITIVE
OXYHGB MFR BLDV: 43.5 % (ref 45–75)
PCO2 BLDV: 58.1 MM HG (ref 41–51)
PCP UR QL SCN: NEGATIVE
PH BLDV: 7.34 PH UNITS (ref 7.32–7.42)
PH UR STRIP.AUTO: 5.5 [PH] (ref 5–8)
PHOSPHATE SERPL-MCNC: 3.9 MG/DL (ref 2.5–4.5)
PLATELET # BLD AUTO: 178 10*3/MM3 (ref 140–450)
PMV BLD AUTO: 10.2 FL (ref 6–12)
PO2 BLDV: 26.7 MM HG (ref 27–53)
POTASSIUM SERPL-SCNC: 4.2 MMOL/L (ref 3.5–5.2)
PROT SERPL-MCNC: 7.3 G/DL (ref 6–8.5)
PROT UR QL STRIP: ABNORMAL
PROTHROMBIN TIME: 13.5 SECONDS (ref 12.5–15.2)
QT INTERVAL: 382 MS
QTC INTERVAL: 443 MS
RBC # BLD AUTO: 4.42 10*6/MM3 (ref 4.14–5.8)
RHINOVIRUS RNA SPEC NAA+PROBE: NOT DETECTED
RSV RNA NPH QL NAA+NON-PROBE: NOT DETECTED
SAO2 % BLDCOV: 44.5 % (ref 45–75)
SARS-COV-2 RNA RESP QL NAA+PROBE: NOT DETECTED
SODIUM SERPL-SCNC: 138 MMOL/L (ref 136–145)
SP GR UR STRIP: 1.02 (ref 1–1.03)
TRICYCLICS UR QL SCN: NEGATIVE
TROPONIN T SERPL HS-MCNC: 9 NG/L
UROBILINOGEN UR QL STRIP: ABNORMAL
VENTILATOR MODE: ABNORMAL
WBC NRBC COR # BLD AUTO: 5.79 10*3/MM3 (ref 3.4–10.8)

## 2025-06-01 PROCEDURE — 0202U NFCT DS 22 TRGT SARS-COV-2: CPT | Performed by: INTERNAL MEDICINE

## 2025-06-01 PROCEDURE — 99232 SBSQ HOSP IP/OBS MODERATE 35: CPT | Performed by: STUDENT IN AN ORGANIZED HEALTH CARE EDUCATION/TRAINING PROGRAM

## 2025-06-01 PROCEDURE — 82820 HEMOGLOBIN-OXYGEN AFFINITY: CPT

## 2025-06-01 PROCEDURE — 85025 COMPLETE CBC W/AUTO DIFF WBC: CPT | Performed by: INTERNAL MEDICINE

## 2025-06-01 PROCEDURE — 82805 BLOOD GASES W/O2 SATURATION: CPT

## 2025-06-01 PROCEDURE — 80053 COMPREHEN METABOLIC PANEL: CPT | Performed by: INTERNAL MEDICINE

## 2025-06-01 PROCEDURE — 83735 ASSAY OF MAGNESIUM: CPT | Performed by: INTERNAL MEDICINE

## 2025-06-01 PROCEDURE — 99221 1ST HOSP IP/OBS SF/LOW 40: CPT | Performed by: PSYCHIATRY & NEUROLOGY

## 2025-06-01 PROCEDURE — 81003 URINALYSIS AUTO W/O SCOPE: CPT | Performed by: INTERNAL MEDICINE

## 2025-06-01 PROCEDURE — 80307 DRUG TEST PRSMV CHEM ANLYZR: CPT | Performed by: INTERNAL MEDICINE

## 2025-06-01 PROCEDURE — 84484 ASSAY OF TROPONIN QUANT: CPT | Performed by: INTERNAL MEDICINE

## 2025-06-01 PROCEDURE — 84100 ASSAY OF PHOSPHORUS: CPT | Performed by: INTERNAL MEDICINE

## 2025-06-01 PROCEDURE — 82140 ASSAY OF AMMONIA: CPT | Performed by: INTERNAL MEDICINE

## 2025-06-01 PROCEDURE — 85610 PROTHROMBIN TIME: CPT | Performed by: INTERNAL MEDICINE

## 2025-06-01 RX ORDER — OXYCODONE AND ACETAMINOPHEN 7.5; 325 MG/1; MG/1
1 TABLET ORAL EVERY 8 HOURS PRN
Refills: 0 | Status: DISCONTINUED | OUTPATIENT
Start: 2025-06-01 | End: 2025-06-03 | Stop reason: HOSPADM

## 2025-06-01 RX ORDER — BISACODYL 5 MG/1
5 TABLET, DELAYED RELEASE ORAL DAILY
Status: DISCONTINUED | OUTPATIENT
Start: 2025-06-01 | End: 2025-06-03 | Stop reason: HOSPADM

## 2025-06-01 RX ORDER — POLYETHYLENE GLYCOL 3350 17 G/17G
17 POWDER, FOR SOLUTION ORAL DAILY
Status: DISCONTINUED | OUTPATIENT
Start: 2025-06-01 | End: 2025-06-03 | Stop reason: HOSPADM

## 2025-06-01 RX ORDER — OXYCODONE AND ACETAMINOPHEN 7.5; 325 MG/1; MG/1
1 TABLET ORAL EVERY 6 HOURS PRN
Status: DISCONTINUED | OUTPATIENT
Start: 2025-06-01 | End: 2025-06-01

## 2025-06-01 RX ORDER — AMOXICILLIN 250 MG
2 CAPSULE ORAL 2 TIMES DAILY PRN
Status: DISCONTINUED | OUTPATIENT
Start: 2025-06-01 | End: 2025-06-03 | Stop reason: HOSPADM

## 2025-06-01 RX ORDER — BISACODYL 10 MG
10 SUPPOSITORY, RECTAL RECTAL DAILY PRN
Status: DISCONTINUED | OUTPATIENT
Start: 2025-06-01 | End: 2025-06-03 | Stop reason: HOSPADM

## 2025-06-01 RX ADMIN — SENNOSIDES AND DOCUSATE SODIUM 2 TABLET: 50; 8.6 TABLET ORAL at 08:47

## 2025-06-01 RX ADMIN — OXYCODONE HYDROCHLORIDE AND ACETAMINOPHEN 1 TABLET: 7.5; 325 TABLET ORAL at 15:30

## 2025-06-01 RX ADMIN — ACETAMINOPHEN 650 MG: 325 TABLET ORAL at 20:05

## 2025-06-01 RX ADMIN — ASPIRIN 81 MG: 81 TABLET, DELAYED RELEASE ORAL at 08:53

## 2025-06-01 RX ADMIN — Medication 10 ML: at 20:03

## 2025-06-01 RX ADMIN — OXYCODONE AND ACETAMINOPHEN 1 TABLET: 10; 325 TABLET ORAL at 01:48

## 2025-06-01 RX ADMIN — BISACODYL 5 MG: 5 TABLET, COATED ORAL at 08:47

## 2025-06-01 RX ADMIN — Medication 1 PATCH: at 08:47

## 2025-06-01 RX ADMIN — POLYETHYLENE GLYCOL (3350) 17 G: 17 POWDER, FOR SOLUTION ORAL at 08:47

## 2025-06-01 NOTE — PROGRESS NOTES
[facility] HOSPITALIST PROGRESS NOTE     Patient Identification:  Name:  Cecil Gomez  Age:  75 y.o.  Sex:  male  :  1950  MRN:  [id number]  Visit Number:  [id number]  ROOM: [address] [place] [address]     Primary Care Provider:  Tavares Guerrero MD    Length of stay in inpatient status:  2    Subjective     Chief Compliant:    Chief Complaint   Patient presents with    Fall    Back Pain       History of Presenting Illness: Patient seen evaluated in follow-up for intractable pain secondary to a acute fracture of the right L1 and L2 transverse processes as previously found after recent fall on 2025.  Patient reporting continued control pain and able to have bowel movement overnight.  Patient alert and oriented and answering questions appropriately at time of evaluation this morning.  Patient does not recall comments made overnight as documented regarding comments made about desire to kill wife and worried that she is going to kill him.  Patient currently on 72-hour hold with psychiatry evaluation pending.  Patient still with limited mobility and will benefit from formal PT evaluation still pending at this time.  .      Objective     Current Hospital Meds:  aspirin, 81 mg, Oral, Daily  polyethylene glycol, 17 g, Oral, Daily   And  bisacodyl, 5 mg, Oral, Daily  Lidocaine, 1 patch, Transdermal, Q24H  sodium chloride, 10 mL, Intravenous, Q12H         ----------------------------------------------------------------------------------------------------------------------  Vital Signs:  Temp:  [97.7 °F (36.5 °C)-99.2 °F (37.3 °C)] 97.7 °F (36.5 °C)  Heart Rate:  [74-81] 74  Resp:  [16-17] 16  BP: (102-164)/(51-96) 122/51  SpO2:  [93 %] 93 %  on  Flow (L/min) (Oxygen Therapy):  [1] 1;   Device (Oxygen Therapy): nasal cannula  Body mass index is 24.22 kg/m².      Intake/Output Summary (Last 24 hours) at 2025 1659  Last data filed at 2025 1602  Gross per 24 hour   Intake 1125 ml   Output 450 ml   Net 675 ml  "     ----------------------------------------------------------------------------------------------------------------------  Physical exam:  Constitutional:  Chronically ill-appearing elderly adult male resting in bed in no distress.   HENT:  Head:  Normocephalic and atraumatic.  Mouth:  Moist mucous membranes.    Eyes:  Conjunctivae and EOM are normal. No scleral icterus.    Neck:  Neck supple.  No JVD present.    Cardiovascular:  Normal rate, regular rhythm and normal heart sounds with no murmur.  Pulmonary/Chest:  No respiratory distress, no wheezes, no crackles, with normal breath sounds and good air movement.  Abdominal:  Soft.  Bowel sounds are normal.  No distension and no tenderness.   Musculoskeletal:  No tenderness, and no deformity.  No red or swollen joints anywhere.  Range of motion and strength in the right lower extremity.  Neurological:  Alert and oriented to person, place, and time with intermittent tangential conversation and confusion.  No cranial nerve deficit.  No tongue deviation.  No facial droop.  No slurred speech. Intact Sensation throughout  Skin:  Skin is warm and dry. No rash or lesion noted. No pallor.   Peripheral vascular:  Pulses in all 4 extremities with no clubbing, no cyanosis, no edema.  Psychiatric: Appropriate mood and affect, pleasant.   ----------------------------------------------------------------------------------------------------------------------  WBC/HGB/HCT/PLT   5.79/12.0/38.6/178 (06/01 0043)  BUN/CREAT/GLUC/ALT/AST/CHARLENE/LIP    20.3/0.93/183/21/26/--/-- (06/01 0043)  LYTES - Na/K/Cl/CO2: 138/4.2/101/27.8 (06/01 0043)  PT/INR/PTT   13.5/0.98/-- (06/01 0043)     No results found for: \"URINECX\"  No results found for: \"BLOODCX\"    I have personally looked at the labs and they are summarized above.  ----------------------------------------------------------------------------------------------------------------------  Detailed radiology reports for the last 24 hours:  CT " Angiogram Chest Pulmonary Embolism  Result Date: 5/30/2025   No thoracic aortic aneurysm or dissection. No pulmonary embolus. Emphysematous changes in the upper lobes. Bronchial inflammation. Normal thyroid gland. Mild enlarged heart size Small size hiatal hernia. Atelectasis at the lung bases including the right middle lobe and lower lingula. Cholecystectomy. No free fluid or free air in the upper abdomen.   This report was finalized on 5/30/2025 10:17 PM by Manan Rankin MD.      XR Chest 1 View  Result Date: 5/30/2025   Enlarged heart size. Mild atelectasis at the lung bases, left greater than right. No pleural effusion or pneumothorax No fracture or foreign body. Cholecystectomy.  This report was finalized on 5/30/2025 9:12 PM by Manan Rankin MD.      CT Thoracic Spine Without Contrast  Result Date: 5/30/2025  Impression: 1. No acute osseous abnormality.  This report was finalized on 5/30/2025 6:53 PM by Conrado Worthington DO.      CT Lumbar Spine Without Contrast  Result Date: 5/30/2025  Impression: 1. Acute fractures right L1 and L2 transverse processes.  This report was finalized on 5/30/2025 6:53 PM by Conrado Worthington DO.      CT Cervical Spine Without Contrast  Result Date: 5/30/2025  Impression: 1. No acute fracture  This report was finalized on 5/30/2025 6:53 PM by Conrado Worthington DO.      CT Head Without Contrast  Result Date: 5/30/2025  1.  No acute intracranial abnormality.  This report was finalized on 5/30/2025 6:53 PM by Conrado Worthington DO.      Assessment & Plan      Intractable pain  Acute fracture of right L1 and L2 transverse process  Physical debility secondary to pain  Acute hypoxic insufficiency secondary to splinting  Opioid-induced constipation    - Increased pain regiment from previously prescribed Percocet 5 mg to 10 mg and patient reporting improved pain tolerance however with concern for possible confusion and delirium exacerbated by this as noted below and will decrease to 7.5 mg every  8..    - Patient with complaints of no bowel movement for the last 10 to 11 days.  Will initiate on aggressive bowel regimen including senna, MiraLAX, bisacodyl, and lactulose all scheduled with bowel movement overnight and will start sipping back therapy..    - Zofran as needed for nausea.    - Patient did undergo CT angiogram of the chest with no evidence of PE, pneumothorax or pneumonia with no documentation of any rib fractures.    - Patient still with limited range of motion and has had no formal therapy since fracture and will keep patient of the day for formal evaluation.    Intermittent delirium  Reported homicidal ideation    - Patient with intermittent delirium likely exacerbated by hospital stay as well as opioid therapy due to his back pain.  Patient significant other also confirming this and speaking to her on the phone.  Patient denying any intent to harm wife and seen and evaluated by psychiatry after initial comments concerning for homicidal ideation overnight and sitter removed and safety precautions lifted.    - Will reduce opioid therapy but still higher dosing than previously prescribed at home given patient's presentation tractable pain.    Chronic:  Anemia  Hypertension  Hyperlipidemia  Hypothyroidism  Chronic mesenteric ischemia  Hx abdominal vein thromboembolism  History of stroke  BPH  Diabetes mellitus type 2  Peripheral neuropathy  History of migraines  GERD  Depression    Copied text in portions of the note has been reviewed and is accurate as of 06/01/25    VTE Prophylaxis:     Active VTE Prophylaxis  Mechanical:        Start        05/31/25 0102  Maintain Sequential Compression Device  Continuous                          Select Pharmacologic VTE Prophylaxis if Desired & Appropriate       Disposition likely home versus short-term rehab pending formal evaluation by physical therapy.    Santiago Garcia DO  [facility]  06/01/25  16:59 EDT

## 2025-06-01 NOTE — PROGRESS NOTES
[facility]    Patient Identification:  Name:  Cecil Gomez  Age:  75 y.o.  Sex:  male  :  1950  MRN:  [id number]  Visit number:  [id number]  Primary Care Provider:  Tavares Guerrero MD    Length of stay in inpatient status:  1    Brief Update     Contacted about the patient threatening to get his friend to kill his wife.  When I saw the patient, he denied saying that he had made that statement to the staff.  The patient did tell me that he heard that his wife wanted to kill him, but he could not provide the source of the information to me.  The patient was not oriented to place when is saw him; he was oriented to person and year.  The patient denies any chest pain, shortness of air, nausea, emesis, diarrhea, leg swelling.  Based on exam, no obvious infection is suspected, as his lungs are clear with no crackles and no wheezes, skin is clear with no rashes.  He was sitting up in a bedside chair without any swaying or weakness noted.  I have reviewed the stat labs and those are not indicative of an acute infections.      Per duty to warn protocol, the wife has been notified of this threat.  The patient is now on a 72 hour hold with a one-to-one sitter at bedside.  Psychiatry has been consulted.    LABS:    CBC and coagulation:  Results from last 7 days   Lab Units 25  0043 25  0254 25  1714   WBC 10*3/mm3 5.79 5.04 4.63   HEMOGLOBIN g/dL 12.0* 11.7* 11.5*   HEMATOCRIT % 38.6 37.6 36.8*   MCV fL 87.3 87.4 87.2   MCHC g/dL 31.1* 31.1* 31.3*   PLATELETS 10*3/mm3 178 133* 148   INR  0.98  --  0.99     Acid/base balance:  Results from last 7 days   Lab Units 25  1824   PH, ARTERIAL pH units 7.406   PO2 ART mm Hg 55.8*   PCO2, ARTERIAL mm Hg 48.9*   HCO3 ART mmol/L 30.7*      25 00:44   pH, Venous 7.342   pCO2, Venous 58.1 (H)     Both of the above were drawn on room air.    Renal and electrolytes:  Results from last 7 days   Lab Units  06/01/25 0043 05/31/25  0254 05/30/25  1714   SODIUM mmol/L 138 138 138   POTASSIUM mmol/L 4.2 4.0 4.5   MAGNESIUM mg/dL 1.8  --   --    CHLORIDE mmol/L 101 103 100   CO2 mmol/L 27.8 25.6 29.3*   BUN mg/dL 20.3 17.3 22.6   CREATININE mg/dL 0.93 0.72* 0.92   CALCIUM mg/dL 9.2 8.7 9.1   PHOSPHORUS mg/dL 3.9  --   --    GLUCOSE mg/dL 183* 173* 269*   ANION GAP mmol/L 9.2 9.4 8.7     Estimated Creatinine Clearance: 69.1 mL/min (by C-G formula based on SCr of 0.93 mg/dL).    Liver and pancreatic function:  Results from last 7 days   Lab Units 06/01/25 0043 05/31/25 0254 05/30/25  1714   ALBUMIN g/dL 3.8 3.6 3.9   BILIRUBIN mg/dL 0.3 0.5 0.4   ALK PHOS U/L 91 85 87   AST (SGOT) U/L 26 23 23   ALT (SGPT) U/L 21 19 22   AMMONIA umol/L 27  --   --    LIPASE U/L  --   --  52     Cardiac:  Results from last 7 days   Lab Units 06/01/25 0043 05/31/25 2039 05/31/25  1921 05/30/25  1829 05/30/25  1714   HSTROP T ng/L 9 8 8   < > 8   PROBNP pg/mL  --   --   --   --  77.8    < > = values in this interval not displayed.       Cultures:  Lab Results   Component Value Date    COLORU Yellow 06/01/2025    CLARITYU Clear 06/01/2025    PHUR 5.5 06/01/2025    GLUCOSEU Negative 06/01/2025    KETONESU Trace (A) 06/01/2025    BLOODU Negative 06/01/2025    LEUKOCYTESUR Negative 06/01/2025    BILIRUBINUR Negative 06/01/2025    UROBILINOGEN 1.0 E.U./dL 06/01/2025       Ludy Bonner MD  [facility]  05/31/25  23:57 EDT

## 2025-06-01 NOTE — NURSING NOTE
"During 2300 rounds pt was talking about him and his wife getting a divorce. Pt then stated \"I would hire my friend to kill my wife but I don't want him to get in trouble\". A few minutes later pt stated to CNA \"I think my wife is going to try and kill me\". Lead nurse, Dr. Bonner, NP, and house supervisor notified. Along with spouse, [facility] dispatch and [facility] department notify. See order   "

## 2025-06-01 NOTE — PLAN OF CARE
Goal Outcome Evaluation:              pt resting in bed with no complaints or concerns at this time. VSS on room air. pt has had intermit confusion. prn pain medication given per MAR. pt has ambulated in room with assistance. pt has had bowel movement this shift. Sitter remains at bedside. UA and respiratory panel sent down. plan of care on going.

## 2025-06-01 NOTE — PLAN OF CARE
Goal Outcome Evaluation:  Plan of Care Reviewed With: patient           Outcome Evaluation: Patient resting in bed at this time. VSS on room air. Pt up to BSC this shift., Pt had bath this shift. No acute changes or concerns at this time. Will continue with plan of care.

## 2025-06-01 NOTE — CONSULTS
"Referring Provider: Dr. Bonner  Reason for Consultation: HI    Chief complaint/Focus of Exam: HI    Subjective .     History of present illness:    Pt is a 75y M, hx anemia, HTN, DLD, hypothyroidism, cerebellar infarct, DM2, neuropathy who was admitted on 5/30/25 for e/m pain and debility. Psychiatry consulted after patient was overheard making comments about killing his wife. Per nursing notes, \"Pt then stated \"I would hire my friend to kill my wife but I don't want him to get in trouble\". A few minutes later pt stated to CNA \"I think my wife is going to try and kill me.\" DTW completed, pt placed on a hold and psychiatry consulted.     Pt seen in room today with sitter present. Pt resting but arousable on exam. Pt oriented to person only. Pt could not recall events prior to hospitalization, why he was admitted, treatment received since admission. He reports he and wife are  and she is \"gonna get the house.\" This appears different than presentation in the ED, at which time wife was present and there were no concerns noted about violence or HI. Patient and wife were reportedly seeking physical rehab at that time, although pt cannot remember that now. Pt states that he lives with/near his brother. Pt could not recall making any statements about harming his wife, although said he has had those thoughts before. He reports they have been  for roughly 1y and \"she'll get the house. I'll go live in another country if I have to.\" Several of patient's responses were odd, tangential, not directly answering questions. He denied current thoughts or any desire to harm his wife, himself or anyone else. He denies psychiatric history, denies substance abuse.    Review of Systems  Review of systems could not be obtained due to   patient confusion.    History  Past Medical History:   Diagnosis Date    Anemia     Arthritis     Blood clot in abdominal vein     BPH (benign prostatic hypertrophy)     Cerebellar infarct " 2/5/2024    CHF (congestive heart failure)     Chronic kidney disease     Colon polyp     Depression     Diabetes     Disease of thyroid gland     Diverticulitis     Elevated PSA     GERD (gastroesophageal reflux disease)     GERD (gastroesophageal reflux disease)     H/O blood clots     History of transfusion     Hypercholesterolemia 8/9/2024    Hyperlipidemia     Hypertension     MI (myocardial infarction)     Migraines    ,   Past Surgical History:   Procedure Laterality Date    CARDIAC CATHETERIZATION  01/31/2012    Normal Coronaries. EF 55%. few PVC    CARDIAC CATHETERIZATION  06/02/2005    30% LAD. Myocardial Bridging    CARDIAC CATHETERIZATION  12/07/2008    Normal Coronaries    CARDIOVASCULAR STRESS TEST  09/14/2015    5 Min.31 Secs. 7.0 METS. 99% THR. BP- 136/70. EF 58%. Negative.    CARDIOVASCULAR STRESS TEST  06/21/2022    Lexiscan- EF 57%. BP- 165/72. R/O Apical Ischemia    CIRCUMCISION N/A 01/31/2020    Procedure: CIRCUMCISION;  Surgeon: Lalo Camejo MD;  Location: [facility];  Service: Urology    COLONOSCOPY N/A 08/25/2021    COLONOSCOPY- Melvina Martinez MD    CONVERTED (HISTORICAL) HOLTER  06/08/2013    Avg 73. . Rare PAC. 3% Artifacts    CONVERTED (HISTORICAL) HOLTER  06/22/2022    7 Days. Avg 66. . 5 SVT    CONVERTED (HISTORICAL) HOLTER  08/10/2023    5 Days. Avg 64. . Rare PAC & PVC    ECHO - CONVERTED  09/14/2015    EF 60%. Trace MR. RVSP- 35 mmHg    ECHO - CONVERTED  06/21/2022    EF 55%. LA- 4.1. Mild MR & AI. RVSP- 47 mmHg    ECHO - CONVERTED  08/14/2023    EF 60%. LA- 3.8. Mild MR. RVSP- 38 mmHg. No shunt    ENDOSCOPY N/A 08/25/2021    EGD- Melvina Martinez    EXTRACORPOREAL SHOCK WAVE LITHOTRIPSY (ESWL) Left 01/31/2020    LITHOTRIPSY;  Surgeon: Lalo Camejo    OTHER SURGICAL HISTORY  01/08/2015    Pulse O2- Abnormal   ,   Family History   Problem Relation Age of Onset    Heart disease Mother     Heart failure Mother     Heart attack Father      Heart disease Father     Stroke Brother     Aneurysm Brother     Heart disease Brother    ,   Social History     Socioeconomic History    Marital status:    Tobacco Use    Smoking status: Former     Current packs/day: 0.00     Average packs/day: 1 pack/day for 50.0 years (50.0 ttl pk-yrs)     Types: Pipe, Cigarettes     Start date:      Quit date:      Years since quittin.4     Passive exposure: Past    Smokeless tobacco: Never   Vaping Use    Vaping status: Never Used   Substance and Sexual Activity    Alcohol use: Not Currently     Comment: Quit in     Drug use: Not Currently     Types: Marijuana     Comment: Quit in     Sexual activity: Defer     E-cigarette/Vaping    E-cigarette/Vaping Use Never User     Passive Exposure No      E-cigarette/Vaping Substances     E-cigarette/Vaping Devices         ,   Medications Prior to Admission   Medication Sig Dispense Refill Last Dose/Taking    aspirin 81 MG EC tablet Take 1 tablet by mouth Daily.   Past Week    oxyCODONE-acetaminophen (PERCOCET) 5-325 MG per tablet Take 1 tablet by mouth Every 6 (Six) Hours As Needed for Severe Pain.   Unknown   , Scheduled Meds:  aspirin, 81 mg, Oral, Daily  polyethylene glycol, 17 g, Oral, Daily   And  bisacodyl, 5 mg, Oral, Daily  Lidocaine, 1 patch, Transdermal, Q24H  sodium chloride, 10 mL, Intravenous, Q12H   , Continuous Infusions:   , PRN Meds:    acetaminophen **OR** acetaminophen **OR** acetaminophen    senna-docusate sodium **AND** polyethylene glycol **AND** bisacodyl **AND** bisacodyl    Magnesium Standard Dose Replacement - Follow Nurse / BPA Driven Protocol    ondansetron    oxyCODONE-acetaminophen    Potassium Replacement - Follow Nurse / BPA Driven Protocol    [COMPLETED] Insert Peripheral IV **AND** sodium chloride    sodium chloride    sodium chloride, and Allergies:  Other and Reglan [metoclopramide]    Objective     Vital Signs   Temp:  [97.7 °F (36.5 °C)-99.2 °F (37.3 °C)] 97.7 °F (36.5  °C)  Heart Rate:  [76-81] 76  Resp:  [16-17] 16  BP: (102-164)/(54-96) 104/54    Mental Status Exam:  Hygiene:   fair  Cooperation:  Guarded  Eye Contact:  Fair  Psychomotor Behavior:  Slow  Affect:  Restricted  Hopelessness: Denies  Speech:  Normal  Thought Progress:  Tangential and disoriented  Thought Content:  confused, potentially delusional  Suicidal:  None  Homicidal:  None  Hallucinations:  None  Delusion:  Other possible paranoid delusions and/or confusion  Memory:  Deficits  Orientation:  Person  Reliability:  poor  Insight:  Poor  Judgement:  Fair  Impulse Control:  Fair    Results Review:   I reviewed the patient's new clinical results.  I reviewed the patient's other test results and agree with the interpretation  I personally viewed and interpreted the patient's EKG/Telemetry data  Lab Results (last 24 hours)       Procedure Component Value Units Date/Time    Respiratory Panel PCR w/COVID-19(SARS-CoV-2) ADELA/VENESSA/NEGAR/PAD/COR/LASHAWN In-House, NP Swab in UTM/VTM, 2 HR TAT - Swab, Nasopharynx [[id number]]  (Normal) Collected: 06/01/25 0130    Specimen: Swab from Nasopharynx Updated: 06/01/25 0225     ADENOVIRUS, PCR Not Detected     Coronavirus 229E Not Detected     Coronavirus HKU1 Not Detected     Coronavirus NL63 Not Detected     Coronavirus OC43 Not Detected     COVID19 Not Detected     Human Metapneumovirus Not Detected     Human Rhinovirus/Enterovirus Not Detected     Influenza A PCR Not Detected     Influenza B PCR Not Detected     Parainfluenza Virus 1 Not Detected     Parainfluenza Virus 2 Not Detected     Parainfluenza Virus 3 Not Detected     Parainfluenza Virus 4 Not Detected     RSV, PCR Not Detected     Bordetella pertussis pcr Not Detected     Bordetella parapertussis PCR Not Detected     Chlamydophila pneumoniae PCR Not Detected     Mycoplasma pneumo by PCR Not Detected    Narrative:      In the setting of a positive respiratory panel with a viral infection PLUS a negative procalcitonin without  other underlying concern for bacterial infection, consider observing off antibiotics or discontinuation of antibiotics and continue supportive care. If the respiratory panel is positive for atypical bacterial infection (Bordetella pertussis, Chlamydophila pneumoniae, or Mycoplasma pneumoniae), consider antibiotic de-escalation to target atypical bacterial infection.    Fentanyl, Urine - Urine, Clean Catch [[id number]]  (Normal) Collected: 06/01/25 0144    Specimen: Urine, Clean Catch Updated: 06/01/25 0206     Fentanyl, Urine Negative    Narrative:      Negative Threshold:      Fentanyl 5 ng/mL     The normal value for the drug tested is negative. This report includes final unconfirmed screening results to be used for medical treatment purposes only. Unconfirmed results must not be used for non-medical purposes such as employment or legal testing. Clinical consideration should be applied to any drug of abuse test, particularly when unconfirmed results are used.           Urine Drug Screen - Urine, Clean Catch [[id number]]  (Abnormal) Collected: 06/01/25 0144    Specimen: Urine, Clean Catch Updated: 06/01/25 0205     THC, Screen, Urine Negative     Phencyclidine (PCP), Urine Negative     Cocaine Screen, Urine Negative     Methamphetamine, Ur Negative     Opiate Screen Positive     Amphetamine Screen, Urine Negative     Benzodiazepine Screen, Urine Negative     Tricyclic Antidepressants Screen Negative     Methadone Screen, Urine Negative     Barbiturates Screen, Urine Negative     Oxycodone Screen, Urine Positive     Buprenorphine, Screen, Urine Negative    Narrative:      Cutoff For Drugs Screened:    Amphetamines               500 ng/ml  Barbiturates               200 ng/ml  Benzodiazepines            150 ng/ml  Cocaine                    150 ng/ml  Methadone                  200 ng/ml  Opiates                    100 ng/ml  Phencyclidine               25 ng/ml  THC                         50 ng/ml  Methamphetamine             500 ng/ml  Tricyclic Antidepressants  300 ng/ml  Oxycodone                  100 ng/ml  Buprenorphine               10 ng/ml    The normal value for all drugs tested is negative. This report includes unconfirmed screening results, with the cutoff values listed, to be used for medical treatment purposes only.  Unconfirmed results must not be used for non-medical purposes such as employment or legal testing.  Clinical consideration should be applied to any drug of abuse test, particularly when unconfirmed results are used.      Urinalysis With Microscopic If Indicated (No Culture) - Urine, Clean Catch [[id number]]  (Abnormal) Collected: 06/01/25 0144    Specimen: Urine, Clean Catch Updated: 06/01/25 0157     Color, UA Yellow     Appearance, UA Clear     pH, UA 5.5     Specific Gravity, UA 1.025     Glucose, UA Negative     Ketones, UA Trace     Bilirubin, UA Negative     Blood, UA Negative     Protein, UA Trace     Leuk Esterase, UA Negative     Nitrite, UA Negative     Urobilinogen, UA 1.0 E.U./dL    Narrative:      Urine microscopic not indicated.    Ammonia [[id number]]  (Normal) Collected: 06/01/25 0043    Specimen: Blood Updated: 06/01/25 0127     Ammonia 27 umol/L     Magnesium [[id number]]  (Normal) Collected: 06/01/25 0043    Specimen: Blood Updated: 06/01/25 0127     Magnesium 1.8 mg/dL     High Sensitivity Troponin T [[id number]]  (Normal) Collected: 06/01/25 0043    Specimen: Blood Updated: 06/01/25 0127     HS Troponin T 9 ng/L     Narrative:      High Sensitive Troponin T Reference Range:  <14.0 ng/L- Negative Female for AMI  <22.0 ng/L- Negative Male for AMI  >=14 - Abnormal Female indicating possible myocardial injury.  >=22 - Abnormal Male indicating possible myocardial injury.   Clinicians would have to utilize clinical acumen, EKG, Troponin, and serial changes to determine if it is an Acute Myocardial Infarction or myocardial injury due to an underlying chronic condition.         Comprehensive  Metabolic Panel [[id number]]  (Abnormal) Collected: 06/01/25 0043    Specimen: Blood Updated: 06/01/25 0127     Glucose 183 mg/dL      BUN 20.3 mg/dL      Creatinine 0.93 mg/dL      Sodium 138 mmol/L      Potassium 4.2 mmol/L      Chloride 101 mmol/L      CO2 27.8 mmol/L      Calcium 9.2 mg/dL      Total Protein 7.3 g/dL      Albumin 3.8 g/dL      ALT (SGPT) 21 U/L      AST (SGOT) 26 U/L      Alkaline Phosphatase 91 U/L      Total Bilirubin 0.3 mg/dL      Globulin 3.5 gm/dL      A/G Ratio 1.1 g/dL      BUN/Creatinine Ratio 21.8     Anion Gap 9.2 mmol/L      eGFR 85.1 mL/min/1.73     Narrative:      GFR Categories in Chronic Kidney Disease (CKD)              GFR Category          GFR (mL/min/1.73)    Interpretation  G1                    90 or greater        Normal or high (1)  G2                    60-89                Mild decrease (1)  G3a                   45-59                Mild to moderate decrease  G3b                   30-44                Moderate to severe decrease  G4                    15-29                Severe decrease  G5                    14 or less           Kidney failure    (1)In the absence of evidence of kidney disease, neither GFR category G1 or G2 fulfill the criteria for CKD.    eGFR calculation 2021 CKD-EPI creatinine equation, which does not include race as a factor    Phosphorus [[id number]]  (Normal) Collected: 06/01/25 0043    Specimen: Blood Updated: 06/01/25 0127     Phosphorus 3.9 mg/dL     Protime-INR [[id number]]  (Normal) Collected: 06/01/25 0043    Specimen: Blood Updated: 06/01/25 0114     Protime 13.5 Seconds      INR 0.98    Narrative:      Suggested INR therapeutic range for stable oral anticoagulant therapy:    Low Intensity therapy:   1.5-2.0  Moderate Intensity therapy:   2.0-3.0  High Intensity therapy:   2.5-4.0    CBC & Differential [[id number]]  (Abnormal) Collected: 06/01/25 0043    Specimen: Blood Updated: 06/01/25 0105    Narrative:      The following orders were  created for panel order CBC & Differential.  Procedure                               Abnormality         Status                     ---------                               -----------         ------                     CBC Auto Differential[[id number]]        Abnormal            Final result                 Please view results for these tests on the individual orders.    CBC Auto Differential [[id number]]  (Abnormal) Collected: 06/01/25 0043    Specimen: Blood Updated: 06/01/25 0105     WBC 5.79 10*3/mm3      RBC 4.42 10*6/mm3      Hemoglobin 12.0 g/dL      Hematocrit 38.6 %      MCV 87.3 fL      MCH 27.1 pg      MCHC 31.1 g/dL      RDW 14.0 %      RDW-SD 44.7 fl      MPV 10.2 fL      Platelets 178 10*3/mm3      Neutrophil % 66.0 %      Lymphocyte % 23.0 %      Monocyte % 9.0 %      Eosinophil % 1.4 %      Basophil % 0.3 %      Immature Grans % 0.3 %      Neutrophils, Absolute 3.82 10*3/mm3      Lymphocytes, Absolute 1.33 10*3/mm3      Monocytes, Absolute 0.52 10*3/mm3      Eosinophils, Absolute 0.08 10*3/mm3      Basophils, Absolute 0.02 10*3/mm3      Immature Grans, Absolute 0.02 10*3/mm3      nRBC 0.0 /100 WBC     Blood Gas, Venous With Co-Ox [[id number]]  (Abnormal) Collected: 06/01/25 0044    Specimen: Venous Blood Updated: 06/01/25 0052     Site Lab     pH, Venous 7.342 pH Units      pCO2, Venous 58.1 mm Hg      Comment: 83 Value above reference range        pO2, Venous 26.7 mm Hg      Comment: 84 Value below reference range        HCO3, Venous 31.4 mmol/L      Comment: 83 Value above reference range        Base Excess, Venous 4.3 mmol/L      O2 Saturation, Venous 44.5 %      Comment: 84 Value below reference range        Hemoglobin, Blood Gas 12.4 g/dL      Comment: 84 Value below reference range        CO2 Content 33.2 mmol/L      Barometric Pressure for Blood Gas 721 mmHg      Modality Room Air     FIO2 21 %      Ventilator Mode NA     Collected by [id number]     Comment: Meter: [id number]     :   [id number]        Oxyhemoglobin Venous 43.5 %      Comment: 84 Value below reference range        Carboxyhemoglobin Venous 1.7 %      Methemoglobin Venous 0.6 %     High Sensitivity Troponin T 1Hr [[id number]]  (Normal) Collected: 05/31/25 2039    Specimen: Blood Updated: 05/31/25 2116     HS Troponin T 8 ng/L      Troponin T Numeric Delta 0 ng/L     Narrative:      High Sensitive Troponin T Reference Range:  <14.0 ng/L- Negative Female for AMI  <22.0 ng/L- Negative Male for AMI  >=14 - Abnormal Female indicating possible myocardial injury.  >=22 - Abnormal Male indicating possible myocardial injury.   Clinicians would have to utilize clinical acumen, EKG, Troponin, and serial changes to determine if it is an Acute Myocardial Infarction or myocardial injury due to an underlying chronic condition.         High Sensitivity Troponin T [[id number]]  (Normal) Collected: 05/31/25 1921    Specimen: Blood Updated: 05/31/25 1952     HS Troponin T 8 ng/L     Narrative:      High Sensitive Troponin T Reference Range:  <14.0 ng/L- Negative Female for AMI  <22.0 ng/L- Negative Male for AMI  >=14 - Abnormal Female indicating possible myocardial injury.  >=22 - Abnormal Male indicating possible myocardial injury.   Clinicians would have to utilize clinical acumen, EKG, Troponin, and serial changes to determine if it is an Acute Myocardial Infarction or myocardial injury due to an underlying chronic condition.               Imaging Results (Last 24 Hours)       ** No results found for the last 24 hours. **              Assessment & Plan     Principal Problem:    Uncontrolled pain     Homicidal ideation  -Pt reportedly made comment about having his wife killed, although denied intention to do so. Pt now cannot recall this event, denies current or any active thoughts of harming her. It was explained to him that a duty to warn had been completed.  -Pt denies thoughts of harm to self or others. Pt fairly debilitated and does not appear to be  an acute threat. He does not appear to require a sitter at this time; will cancel.  -Pt physically limited, denies HI, contracts for safety. No hold necessary for HI.    Confusion  -R/O dementia, encephalopathy  -Pt quite confused on exam today. Unknown baseline. No hold necessary for HI, but he does not appear to have appropriate medical decision making capacity at this time. Would defer to primary team and NOK.    I discussed the patient's findings and my recommendations with patient and nursing staff    Royal Smart MD  06/01/25  14:58 EDT

## 2025-06-02 LAB
ANION GAP SERPL CALCULATED.3IONS-SCNC: 10.7 MMOL/L (ref 5–15)
BUN SERPL-MCNC: 18.8 MG/DL (ref 8–23)
BUN/CREAT SERPL: 26.9 (ref 7–25)
CALCIUM SPEC-SCNC: 8.8 MG/DL (ref 8.6–10.5)
CHLORIDE SERPL-SCNC: 100 MMOL/L (ref 98–107)
CO2 SERPL-SCNC: 25.3 MMOL/L (ref 22–29)
CREAT SERPL-MCNC: 0.7 MG/DL (ref 0.76–1.27)
EGFRCR SERPLBLD CKD-EPI 2021: 95.5 ML/MIN/1.73
GLUCOSE SERPL-MCNC: 193 MG/DL (ref 65–99)
POTASSIUM SERPL-SCNC: 4.3 MMOL/L (ref 3.5–5.2)
SODIUM SERPL-SCNC: 136 MMOL/L (ref 136–145)

## 2025-06-02 PROCEDURE — 97166 OT EVAL MOD COMPLEX 45 MIN: CPT

## 2025-06-02 PROCEDURE — 99232 SBSQ HOSP IP/OBS MODERATE 35: CPT | Performed by: HOSPITALIST

## 2025-06-02 PROCEDURE — 80048 BASIC METABOLIC PNL TOTAL CA: CPT | Performed by: STUDENT IN AN ORGANIZED HEALTH CARE EDUCATION/TRAINING PROGRAM

## 2025-06-02 PROCEDURE — 97162 PT EVAL MOD COMPLEX 30 MIN: CPT

## 2025-06-02 RX ADMIN — Medication 10 ML: at 09:03

## 2025-06-02 RX ADMIN — ACETAMINOPHEN 650 MG: 325 TABLET ORAL at 06:25

## 2025-06-02 RX ADMIN — Medication 10 ML: at 20:46

## 2025-06-02 RX ADMIN — ASPIRIN 81 MG: 81 TABLET, DELAYED RELEASE ORAL at 09:01

## 2025-06-02 RX ADMIN — Medication 1 PATCH: at 09:01

## 2025-06-02 RX ADMIN — ACETAMINOPHEN 650 MG: 325 TABLET ORAL at 20:49

## 2025-06-02 RX ADMIN — ACETAMINOPHEN 650 MG: 325 TABLET ORAL at 11:33

## 2025-06-02 NOTE — CONSULTS
"Diabetes Education  Assessment/Teaching    Patient Name:  Cecil Gomez  YOB: 1950  MRN: [id number]  Admit Date:  5/30/2025      Assessment Date:  6/2/2025  Flowsheet Row Most Recent Value   General Information     Height 172.7 cm (68\")   Height Method Stated   Weight 72.3 kg (159 lb 4.8 oz)   Weight Method Bed scale   Pregnancy Assessment    Diabetes History    Feeling down, depressed, or hopeless Not at all   Little interest or pleasure in doing things Not at all   Education Preferences    Nutrition Information    Assessment Topics    DM Goals             Flowsheet Row Most Recent Value   DM Education Needs    Meter Has own   Frequency of Testing Daily   Problem Solving Hypoglycemia, Hyperglycemia, Sick days, Signs, Symptoms, Treatment   Reducing Risks Cardiovascular, Immunizations, Foot care, Dental exam, Eye exam, Blood pressure, Lipids, A1C testing, Neuropathy, Retinopathy   Healthy Eating Basic meal plan provided   Physical Activity Walking   Physical Activity Frequency Occasionally   Healthy Coping Appropriate   Discharge Plan Follow-up with PCP   Motivation Moderate   Teaching Method Explanation, Discussion, Handouts   Patient Response Verbalized understanding              Other Comments:  A1C 8.9 Patient was educated and received AADE7 and ADA handouts on diet, activity, checking blood glucose, taking medication as prescribed, checking feet daily and S/S of hypo/hyperglycemia. Patient was educated on sick rule days. Patient had no questions or concerns. Please re-consult or call for concerns or questions. Thank you.        Electronically signed by:  Melvina Chang RN  06/02/25 14:25 EDT  "

## 2025-06-02 NOTE — PLAN OF CARE
Goal Outcome Evaluation:              Outcome Evaluation: Pt A/Ox4 this shift with no confusion. Pt ambulated in hallway with PT and walker. Pt c/o pain, medications administered per MAR. Pt. educated on importance of weight shifting every two hours and importance of batheing. Pt denies any other concerns at this time, will continue with POC.

## 2025-06-02 NOTE — PLAN OF CARE
Goal Outcome Evaluation:              Outcome Evaluation: pt resting in bed at this time with no complaints or concerns. VSS on RA. pt has ambulated in room this shift. PRN pain medication given per MAR. plan of care on going

## 2025-06-02 NOTE — CASE MANAGEMENT/SOCIAL WORK
Discharge Planning Assessment  [facility]     Patient Name: Cecil Gomez  MRN: [id number]  Today's Date: 6/2/2025    Admit Date: 5/30/2025     Discharge Needs Assessment       Row Name 06/02/25 1140       Living Environment    People in Home alone    Current Living Arrangements home    Primary Care Provided by self    Provides Primary Care For no one    Caregiving Concerns Patient lives alone, he and ex spouse are seperated. Brother are involved    Family Caregiver if Needed sibling(s)    Family Caregiver Names Brother Doc Gomez & Philomena Gomez    Quality of Family Relationships helpful;involved;supportive    Able to Return to Prior Arrangements yes       Transition Planning    Patient/Family Anticipates Transition to home    Transportation Anticipated family or friend will provide       Discharge Needs Assessment    Concerns to be Addressed discharge planning                   Discharge Plan       Row Name 06/02/25 1146       Plan    Plan Pt was admitted on 05/30/25. SS received APRN consult for family/patient request. SS spoke with Pt at bedside on this date. Pt lives alone and plans to return home at discharge. Pt does not utilize home health services. Pt has a rolling walker in storage via gift. Pt's PLOF was independent with ADL's. Pt's PCP is Jl Guerrero. Pt does not have a POA or living will. Pt states he and his wife are seperated. Pt's brother to provide transportation home at discharge. Pt discussed with Physical Therapy on this date. Pt was able to ambulate with walker and standby assist. SS updated Provider. SS to follow.                    MELVA Osorio

## 2025-06-02 NOTE — PROGRESS NOTES
[facility]     Patient Identification:  Name:  Cecil Gomez  Age:  75 y.o.  Sex:  male  :  1950  MRN:  [id number]  Visit Number:  [id number]  Primary Care Provider:  Tavares Guerrero MD    Length of stay:  3    Subjective: Patient seen and examined, awake and alert no confusion, assisted by CHAZ Powell.  He is now able to walk pain is more tolerable, his biggest issue at this time is he does not want to go back home with his ex-wife.  He plans to go to his brother's place.    Chief Complaint: Back pain  ----------------------------------------------------------------------------------------------------------------------  Current Hospital Meds:  aspirin, 81 mg, Oral, Daily  polyethylene glycol, 17 g, Oral, Daily   And  bisacodyl, 5 mg, Oral, Daily  Lidocaine, 1 patch, Transdermal, Q24H  sodium chloride, 10 mL, Intravenous, Q12H         ----------------------------------------------------------------------------------------------------------------------  Vital Signs:  Temp:  [98.1 °F (36.7 °C)-98.6 °F (37 °C)] 98.1 °F (36.7 °C)  Resp:  [18] 18  BP: (126-177)/(59-79) 177/78       Tele:       25  1541 25  0036   Weight: 72.6 kg (160 lb) 72.3 kg (159 lb 4.8 oz)     Body mass index is 24.22 kg/m².    Intake/Output Summary (Last 24 hours) at 2025 1332  Last data filed at 2025 0700  Gross per 24 hour   Intake 380 ml   Output 200 ml   Net 180 ml     Diet: Regular/House; Fluid Consistency: Thin (IDDSI 0)  ----------------------------------------------------------------------------------------------------------------------  Physical exam:  General: Comfortable,awake, alert, oriented to self, place, and time, well-developed and well-nourished.  No respiratory distress.    Skin:  Skin is warm and dry. No rash noted. No pallor.    HENT:  Head:  Normocephalic and atraumatic.  Mouth:  Moist mucous membranes.    Eyes:  Conjunctivae and EOM are normal.  Pupils  are equal, round, and reactive to light.  No scleral icterus.    Neck:  Neck supple.  No JVD present.    Pulmonary/Chest:  No respiratory distress, no wheezes, no crackles, with normal breath sounds and good air movement.  Cardiovascular:  Normal rate, regular rhythm and normal heart sounds with no murmur.  Abdominal:  Soft.  Bowel sounds are normal.  No distension and no tenderness.   Extremities:  No edema, no tenderness, and no deformity.  No red or swollen joints anywhere.  Strong pulses in all 4 extremities with no clubbing, no cyanosis, no edema.  Neurological:  Motor strength equal no obvious deficit, sensory grossly intact.   No cranial nerve deficit.  No tongue deviation.  No facial droop.  No slurred speech.    Genitourinary: No Bautista catheter  Back: Hematoma bruising just above the gluteal cleft right upper area sacrum.  ----------------------------------------------------------------------------------------------------------------------  ----------------------------------------------------------------------------------------------------------------------  Results from last 7 days   Lab Units 06/01/25 0043 05/31/25 2039 05/31/25  1921   HSTROP T ng/L 9 8 8     Results from last 7 days   Lab Units 06/01/25  0043 05/31/25  0254 05/30/25  1714   WBC 10*3/mm3 5.79 5.04 4.63   HEMOGLOBIN g/dL 12.0* 11.7* 11.5*   HEMATOCRIT % 38.6 37.6 36.8*   MCV fL 87.3 87.4 87.2   MCHC g/dL 31.1* 31.1* 31.3*   PLATELETS 10*3/mm3 178 133* 148   INR  0.98  --  0.99     Results from last 7 days   Lab Units 05/30/25  1824   PH, ARTERIAL pH units 7.406   PO2 ART mm Hg 55.8*   PCO2, ARTERIAL mm Hg 48.9*   HCO3 ART mmol/L 30.7*     Results from last 7 days   Lab Units 06/02/25  0744 06/01/25  0043 05/31/25  0254 05/30/25  1714   SODIUM mmol/L 136 138 138 138   POTASSIUM mmol/L 4.3 4.2 4.0 4.5   MAGNESIUM mg/dL  --  1.8  --   --    CHLORIDE mmol/L 100 101 103 100   CO2 mmol/L 25.3 27.8 25.6 29.3*   BUN mg/dL 18.8 20.3 17.3 22.6  "  CREATININE mg/dL 0.70* 0.93 0.72* 0.92   CALCIUM mg/dL 8.8 9.2 8.7 9.1   GLUCOSE mg/dL 193* 183* 173* 269*   ALBUMIN g/dL  --  3.8 3.6 3.9   BILIRUBIN mg/dL  --  0.3 0.5 0.4   ALK PHOS U/L  --  91 85 87   AST (SGOT) U/L  --  26 23 23   ALT (SGPT) U/L  --  21 19 22   Estimated Creatinine Clearance: 91.8 mL/min (A) (by C-G formula based on SCr of 0.7 mg/dL (L)).    Ammonia   Date Value Ref Range Status   06/01/2025 27 16 - 60 umol/L Final         No results found for: \"BLOODCX\"  No results found for: \"URINECX\"  No results found for: \"WOUNDCX\"  No results found for: \"STOOLCX\"    I have personally looked at the labs and they are summarized above.  ----------------------------------------------------------------------------------------------------------------------  Imaging Results (Last 24 Hours)       ** No results found for the last 24 hours. **          ----------------------------------------------------------------------------------------------------------------------  Assessment and Plan:  - Acute fracture of right L1 and L2 transverse process  -Acute debility secondary to intractable pain  -Opioid-induced constipation  -Acute encephalopathy likely medication induced and pain  -Episode of homicidal ideation now cleared    Continue PT OT, GI prophylaxis for constipation, will discuss with  and  for discharge planning, PT OT to evaluate possible inpatient rehab.    Disposition Home or SNF for PT patient      Jeanne Abebe MD  06/02/25  13:32 EDT  "

## 2025-06-02 NOTE — THERAPY EVALUATION
Acute Care - Physical Therapy Initial Evaluation   Robert     Patient Name: Cecil Gomez  : 1950  MRN: [id number]  Today's Date: 2025   Onset of Illness/Injury or Date of Surgery: 25  Visit Dx:     ICD-10-CM ICD-9-CM   1. Uncontrolled pain  R52 780.96     Patient Active Problem List   Diagnosis    Microhematuria    Elevated PSA    Benign non-nodular prostatic hyperplasia with lower urinary tract symptoms    Kidney stone    Phimosis    Anemia    Personal history of colonic polyps    Dizziness    Bradycardia, sinus    Shortness of breath    Primary hypertension    Precordial pain    Cerebellar infarct    Hypercholesterolemia    Acne rosacea, papular type    Acquired hypothyroidism    Acute pancreatitis    Atopic dermatitis    Cerebrovascular accident    Blood coagulation disorder    Chronic fatigue syndrome    Mesenteric ischemia, chronic    Closed fracture of transverse process of lumbar vertebra    Neuropathy due to type 2 diabetes mellitus    Ex-cigarette smoker    Uncontrolled pain     Past Medical History:   Diagnosis Date    Anemia     Arthritis     Blood clot in abdominal vein     BPH (benign prostatic hypertrophy)     Cerebellar infarct 2024    CHF (congestive heart failure)     Chronic kidney disease     Colon polyp     Depression     Diabetes     Disease of thyroid gland     Diverticulitis     Elevated PSA     GERD (gastroesophageal reflux disease)     GERD (gastroesophageal reflux disease)     H/O blood clots     History of transfusion     Hypercholesterolemia 2024    Hyperlipidemia     Hypertension     MI (myocardial infarction)     Migraines      Past Surgical History:   Procedure Laterality Date    CARDIAC CATHETERIZATION  2012    Normal Coronaries. EF 55%. few PVC    CARDIAC CATHETERIZATION  2005    30% LAD. Myocardial Bridging    CARDIAC CATHETERIZATION  2008    Normal Coronaries    CARDIOVASCULAR STRESS TEST  2015    5 Min.31 Secs. 7.0 METS. 99%  THR. BP- 136/70. EF 58%. Negative.    CARDIOVASCULAR STRESS TEST  06/21/2022    Lexiscan- EF 57%. BP- 165/72. R/O Apical Ischemia    CIRCUMCISION N/A 01/31/2020    Procedure: CIRCUMCISION;  Surgeon: Lalo Camejo MD;  [facility];  Service: Urology    COLONOSCOPY N/A 08/25/2021    COLONOSCOPY- Melvina Martinez MD    CONVERTED (HISTORICAL) HOLTER  06/08/2013    Avg 73. . Rare PAC. 3% Artifacts    CONVERTED (HISTORICAL) HOLTER  06/22/2022    7 Days. Avg 66. . 5 SVT    CONVERTED (HISTORICAL) HOLTER  08/10/2023    5 Days. Avg 64. . Rare PAC & PVC    ECHO - CONVERTED  09/14/2015    EF 60%. Trace MR. RVSP- 35 mmHg    ECHO - CONVERTED  06/21/2022    EF 55%. LA- 4.1. Mild MR & AI. RVSP- 47 mmHg    ECHO - CONVERTED  08/14/2023    EF 60%. LA- 3.8. Mild MR. RVSP- 38 mmHg. No shunt    ENDOSCOPY N/A 08/25/2021    EGD- Melvina Martinez    EXTRACORPOREAL SHOCK WAVE LITHOTRIPSY (ESWL) Left 01/31/2020    LITHOTRIPSY;  Surgeon: Lalo Camejo    OTHER SURGICAL HISTORY  01/08/2015    Pulse O2- Abnormal     PT Assessment (Last 12 Hours)       PT Evaluation and Treatment       Row Name 06/02/25 1300          Physical Therapy Time and Intention    Document Type evaluation  -     Mode of Treatment physical therapy  -     Patient Effort good  -     Symptoms Noted During/After Treatment none  -       Row Name 06/02/25 1300          General Information    Patient Profile Reviewed yes  -     Onset of Illness/Injury or Date of Surgery 05/30/25  -     Referring Physician Ocul  -     Patient Observations alert;cooperative;agree to therapy  -     Prior Level of Function independent:;all household mobility;gait;transfer;bed mobility  -     Equipment Currently Used at Home none  -     Existing Precautions/Restrictions fall;other (see comments)  Pt does report a history of a couple falls  -       Row Name 06/02/25 1300          Previous Level of Function/Home Environm  "   Bed Mobility, Previous Functional Level independent  -     Transfers, Previous Functional Level independent  -     Household Ambulation, Previous Functional Level independent  -     Previous Level of Function Patient reports he was independent prior but also reports he had a couple falls.  -[facility] Name 06/02/25 1300          Living Environment    Current Living Arrangements home  -     People in Home alone  -     Primary Care Provided by self  -[facility] Name 06/02/25 1300          Home Use of Assistive/Adaptive Equipment    Equipment Currently Used at Home none  -[facility] Name 06/02/25 1300          Pain    Additional Documentation Pain Scale: FACES Pre/Post-Treatment (Group)  -[facility] Name 06/02/25 1300          Pain Scale: FACES Pre/Post-Treatment    Pain: FACES Scale, Pretreatment 2-->hurts little bit  -     Posttreatment Pain Rating 2-->hurts little bit  -     Pre/Posttreatment Pain Comment Patient reports he feels more \"soreness\" than pain and reports it is the same at rest as it is with movement.  -       Row Name 06/02/25 1300          Cognition    Orientation Status (Cognition) oriented x 3  -     Follows Commands (Cognition) WFL  -     Personal Safety Interventions fall prevention program maintained;muscle strengthening facilitated;nonskid shoes/slippers when out of bed;supervised activity  -[facility] Name 06/02/25 1300          Range of Motion (ROM)    Range of Motion ROM is WFL;bilateral lower extremities  -[facility] Name 06/02/25 1300          Strength Comprehensive (MMT)    Comment, General Manual Muscle Testing (MMT) Assessment BLE [facility]  -[facility] Name 06/02/25 1300          Bed Mobility    Bed Mobility supine-sit;sit-supine  -     Supine-Sit [place] (Bed Mobility) supervision  -     Sit-Supine [place] (Bed Mobility) supervision  -[facility] Name 06/02/25 1300          Transfers    Transfers sit-stand transfer;stand-sit transfer  -  "      Row Name 06/02/25 1300          Sit-Stand Transfer    Sit-Stand [place] (Transfers) supervision  -     Assistive Device (Sit-Stand Transfers) walker, front-wheeled  -KP       Row Name 06/02/25 1300          Stand-Sit Transfer    Stand-Sit [place] (Transfers) supervision  -     Assistive Device (Stand-Sit Transfers) walker, front-wheeled  -KP       Row Name 06/02/25 1300          Gait/Stairs (Locomotion)    [place] Level (Gait) supervision;1 person assist  -     Assistive Device (Gait) walker, front-wheeled  -     Patient was able to Ambulate yes  -KP     Distance in Feet (Gait) 150  -KP     Pattern (Gait) step-through  -KP     Deviations/Abnormal Patterns (Gait) ariana decreased;gait speed decreased;stride length decreased  -       Row Name 06/02/25 1300          Balance    Balance Assessment sitting static balance;sitting dynamic balance;standing static balance;standing dynamic balance  -KP     Static Sitting Balance modified independence  -KP     Dynamic Sitting Balance modified independence  -     Position, Sitting Balance sitting edge of bed  -     Static Standing Balance standby assist  -     Dynamic Standing Balance standby assist  -     Position/Device Used, Standing Balance walker, front-wheeled  -KP       Row Name 06/02/25 1300          Plan of Care Review    Plan of Care Reviewed With patient  -     Outcome Evaluation PT evaluation completed.  Patient was able to ambulate with walker and SBA.  He frequently picked up the walker and needed some safety cues during ambulation.  May benefit from use of cane.  PT to continue treatment to improve dynamic standing balance, safety awareness and gait training.  Pt can return home with use of walker or cane and family to assist as needed.  -       Row Name 06/02/25 1300          Positioning and Restraints    Pre-Treatment Position in bed  -     Post Treatment Position bed  -KP     In Bed notified nsg;fowlers;call light  within reach;encouraged to call for assist;exit alarm on;side rails up x3  Lines in tact and needs in reach  -       Row Name 06/02/25 1300          Therapy Assessment/Plan (PT)    Functional Level at Time of Evaluation (PT) SBA for functional mobility  -     Rehab Potential (PT) good  -     Criteria for Skilled Interventions Met (PT) yes;meets criteria;skilled treatment is necessary  -     Therapy Frequency (PT) 2 times/wk  -     Problem List (PT) problems related to;balance;mobility  -     Activity Limitations Related to Problem List (PT) unable to ambulate safely  -       Row Name 06/02/25 1300          PT Evaluation Complexity    History, PT Evaluation Complexity 3 or more personal factors and/or comorbidities  -     Examination of Body Systems (PT Eval Complexity) total of 3 or more elements  -     Clinical Presentation (PT Evaluation Complexity) evolving  -     Clinical Decision Making (PT Evaluation Complexity) moderate complexity  -     Overall Complexity (PT Evaluation Complexity) moderate complexity  -       Row Name 06/02/25 1300          Physical Therapy Goals    Transfer Goal Selection (PT) transfer, PT goal 1  -     Gait Training Goal Selection (PT) gait training, PT goal 1  -       Row Name 06/02/25 1300          Transfer Goal 1 (PT)    Activity/Assistive Device (Transfer Goal 1, PT) transfers, all  -KP     [place] Level/Cues Needed (Transfer Goal 1, PT) independent  -KP     Time Frame (Transfer Goal 1, PT) long term goal (LTG);by discharge  -       Row Name 06/02/25 1300          Gait Training Goal 1 (PT)    Activity/Assistive Device (Gait Training Goal 1, PT) gait (walking locomotion);assistive device use;cane, straight  -KP     [place] Level (Gait Training Goal 1, PT) modified independence  -KP     Distance (Gait Training Goal 1, PT) 250  -KP     Time Frame (Gait Training Goal 1, PT) long term goal (LTG);by discharge  -               User Key  (r) =  Recorded By, (t) = Taken By, (c) = Cosigned By      Initials Name Provider Type    Josie Russell PT Physical Therapist                      PT Recommendation and Plan  Anticipated Discharge Disposition (PT): home with assist  Planned Therapy Interventions (PT): balance training, gait training, home exercise program, neuromuscular re-education, patient/family education, stair training, strengthening  Therapy Frequency (PT): 2 times/wk  Plan of Care Reviewed With: patient  Outcome Evaluation: PT evaluation completed.  Patient was able to ambulate with walker and SBA.  He frequently picked up the walker and needed some safety cues during ambulation.  May benefit from use of cane.  PT to continue treatment to improve dynamic standing balance, safety awareness and gait training.  Pt can return home with use of walker or cane and family to assist as needed.       Time Calculation:    PT Charges       Row Name 06/02/25 1406             Time Calculation    PT Received On 06/02/25  -      PT Goal Re-Cert Due Date 06/16/25  -                User Key  (r) = Recorded By, (t) = Taken By, (c) = Cosigned By      Initials Name Provider Type    Josie Russell PT Physical Therapist                  Therapy Charges for Today       Code Description Service Date Service Provider Modifiers Qty    92695733297 HC PT EVAL MOD COMPLEXITY 4 6/2/2025 Josie Tyson, PT GP 1            PT G-Codes  AM-PAC 6 Clicks Score (PT): 19    Josie Tyson PT  6/2/2025

## 2025-06-02 NOTE — THERAPY EVALUATION
Acute Care - Occupational Therapy Initial Evaluation   Robert     Patient Name: Cecil Gomez  : 1950  MRN: [id number]  Today's Date: 2025             Admit Date: 2025       ICD-10-CM ICD-9-CM   1. Uncontrolled pain  R52 780.96     Patient Active Problem List   Diagnosis    Microhematuria    Elevated PSA    Benign non-nodular prostatic hyperplasia with lower urinary tract symptoms    Kidney stone    Phimosis    Anemia    Personal history of colonic polyps    Dizziness    Bradycardia, sinus    Shortness of breath    Primary hypertension    Precordial pain    Cerebellar infarct    Hypercholesterolemia    Acne rosacea, papular type    Acquired hypothyroidism    Acute pancreatitis    Atopic dermatitis    Cerebrovascular accident    Blood coagulation disorder    Chronic fatigue syndrome    Mesenteric ischemia, chronic    Closed fracture of transverse process of lumbar vertebra    Neuropathy due to type 2 diabetes mellitus    Ex-cigarette smoker    Uncontrolled pain     Past Medical History:   Diagnosis Date    Anemia     Arthritis     Blood clot in abdominal vein     BPH (benign prostatic hypertrophy)     Cerebellar infarct 2024    CHF (congestive heart failure)     Chronic kidney disease     Colon polyp     Depression     Diabetes     Disease of thyroid gland     Diverticulitis     Elevated PSA     GERD (gastroesophageal reflux disease)     GERD (gastroesophageal reflux disease)     H/O blood clots     History of transfusion     Hypercholesterolemia 2024    Hyperlipidemia     Hypertension     MI (myocardial infarction)     Migraines      Past Surgical History:   Procedure Laterality Date    CARDIAC CATHETERIZATION  2012    Normal Coronaries. EF 55%. few PVC    CARDIAC CATHETERIZATION  2005    30% LAD. Myocardial Bridging    CARDIAC CATHETERIZATION  2008    Normal Coronaries    CARDIOVASCULAR STRESS TEST  2015    5 Min.31 Secs. 7.0 METS. 99% THR. BP- 136/70. EF 58%.  Negative.    CARDIOVASCULAR STRESS TEST  06/21/2022    Lexiscan- EF 57%. BP- 165/72. R/O Apical Ischemia    CIRCUMCISION N/A 01/31/2020    Procedure: CIRCUMCISION;  Surgeon: Lalo Camejo MD;  Location: [facility] OR;  Service: Urology    COLONOSCOPY N/A 08/25/2021    COLONOSCOPY- Melvina Martinez MD    CONVERTED (HISTORICAL) HOLTER  06/08/2013    Avg 73. . Rare PAC. 3% Artifacts    CONVERTED (HISTORICAL) HOLTER  06/22/2022    7 Days. Avg 66. . 5 SVT    CONVERTED (HISTORICAL) HOLTER  08/10/2023    5 Days. Avg 64. . Rare PAC & PVC    ECHO - CONVERTED  09/14/2015    EF 60%. Trace MR. RVSP- 35 mmHg    ECHO - CONVERTED  06/21/2022    EF 55%. LA- 4.1. Mild MR & AI. RVSP- 47 mmHg    ECHO - CONVERTED  08/14/2023    EF 60%. LA- 3.8. Mild MR. RVSP- 38 mmHg. No shunt    ENDOSCOPY N/A 08/25/2021    EGD- Melvina Martinez    EXTRACORPOREAL SHOCK WAVE LITHOTRIPSY (ESWL) Left 01/31/2020    LITHOTRIPSY;  Surgeon: Lalo Camejo    OTHER SURGICAL HISTORY  01/08/2015    Pulse O2- Abnormal         OT ASSESSMENT FLOWSHEET (Last 12 Hours)       OT Evaluation and Treatment       Row Name 06/02/25 1111                   OT Time and Intention    Document Type evaluation  -KR        Mode of Treatment occupational therapy  -KR        Patient Effort adequate  -KR           General Information    General Observations of Patient alert/cooperative  -KR           Living Environment    Current Living Arrangements home  -KR        People in Home alone  -KR        Primary Care Provided by self  -KR           Home Use of Assistive/Adaptive Equipment    Equipment Currently Used at Home none  -KR           Pain Assessment    Pretreatment Pain Rating 1/10  -KR        Pain Location back;buttock  -KR        Pre/Posttreatment Pain Comment Minimal pain described as soreness, while at rest  -KR           Cognition    Affect/Mental Status (Cognition) WFL  -KR        Orientation Status (Cognition) oriented x 3  -KR         Follows Commands (Cognition) WFL  -KR           Range of Motion Comprehensive    Comment, General Range of Motion BUE [facility]  -KR           Strength Comprehensive (MMT)    Comment, General Manual Muscle Testing (MMT) Assessment \[facility] [place] [facility]\""  -KR           Sensory    Additional Documentation --  \[facility] [place] [facility]\""  -KR           Activities of Daily Living    BADL Assessment/Intervention bathing;upper body dressing;lower body dressing;grooming;feeding;toileting  -KR           Bathing Assessment/Intervention    [place] Level (Bathing) bathing skills;minimum assist (75% patient effort)  -KR           Upper Body Dressing Assessment/Training    [place] Level (Upper Body Dressing) upper body dressing skills;minimum assist (75% patient effort)  -KR           Lower Body Dressing Assessment/Training    [place] Level (Lower Body Dressing) lower body dressing skills;contact guard assist  -KR           Grooming Assessment/Training    [place] Level (Grooming) grooming skills;set up  -KR           Self-Feeding Assessment/Training    [place] Level (Feeding) feeding skills;set up  -KR           Toileting Assessment/Training    [place] Level (Toileting) toileting skills;contact guard assist  -KR           Plan of Care Review    Plan of Care Reviewed With patient  -KR        Progress improving  -KR           Therapy Assessment/Plan (OT)    Rehab Potential (OT) good  -KR        Planned Therapy Interventions (OT) activity tolerance training;adaptive equipment training  -KR           Therapy Plan Review/Discharge Plan (OT)    Anticipated Discharge Disposition (OT) home  -KR           OT Goals    Dressing Goal Selection (OT) dressing, OT goal 1  -KR        Activity Tolerance Goal Selection (OT) activity tolerance, OT goal 1  -KR           Dressing Goal 1 (OT)    Activity/Device (Dressing Goal 1, OT) dressing skills, all  -KR        [place]/Cues Needed (Dressing Goal 1, OT) modified independence  -KR        Time Frame  (Dressing Goal 1, OT) by discharge  -KR            Activity Tolerance Goal 1 (OT)    Activity Tolerance Goal 1 (OT) Increase to enhance self care/mobility performance  -KR        Activity Level (Endurance Goal 1, OT) 15 min activity  -KR        Time Frame (Activity Tolerance Goal 1, OT) by discharge  -KR           Patient Education Goal (OT)    Activity (Patient Education Goal, OT) AE/DME training as needed to enhance safety/independence  -KR        [place]/Cues/Accuracy (Memory Goal 2, OT) verbalizes understanding  -KR        Time Frame (Patient Education Goal, OT) by discharge  -KR                  User Key  (r) = Recorded By, (t) = Taken By, (c) = Cosigned By      Initials Name Effective Dates    Ronnie Snider OT 06/16/21 -                            OT Recommendation and Plan  Planned Therapy Interventions (OT): activity tolerance training, adaptive equipment training  Plan of Care Review  Plan of Care Reviewed With: patient  Progress: improving  Plan of Care Reviewed With: patient        Time Calculation:     Therapy Charges for Today       Code Description Service Date Service Provider Modifiers Qty    [id number]  OT EVAL MOD COMPLEXITY 4 6/2/2025 Ronnie Bailey OT GO 1                 Ronnie Bailey OT  6/2/2025

## 2025-06-03 ENCOUNTER — READMISSION MANAGEMENT (OUTPATIENT)
Dept: CALL CENTER | Facility: HOSPITAL | Age: 75
End: 2025-06-03
Payer: MEDICARE

## 2025-06-03 VITALS
TEMPERATURE: 97.8 F | WEIGHT: 159.3 LBS | BODY MASS INDEX: 24.14 KG/M2 | RESPIRATION RATE: 16 BRPM | HEART RATE: 75 BPM | SYSTOLIC BLOOD PRESSURE: 118 MMHG | HEIGHT: 68 IN | DIASTOLIC BLOOD PRESSURE: 62 MMHG | OXYGEN SATURATION: 93 %

## 2025-06-03 PROCEDURE — 99239 HOSP IP/OBS DSCHRG MGMT >30: CPT | Performed by: HOSPITALIST

## 2025-06-03 PROCEDURE — 97116 GAIT TRAINING THERAPY: CPT

## 2025-06-03 PROCEDURE — 97110 THERAPEUTIC EXERCISES: CPT

## 2025-06-03 RX ORDER — LIDOCAINE 4 G/G
1 PATCH TOPICAL
Qty: 30 PATCH | Refills: 3 | Status: SHIPPED | OUTPATIENT
Start: 2025-06-04

## 2025-06-03 RX ORDER — ACETAMINOPHEN 325 MG/1
650 TABLET ORAL EVERY 4 HOURS PRN
Qty: 60 TABLET | Refills: 2 | Status: SHIPPED | OUTPATIENT
Start: 2025-06-03

## 2025-06-03 RX ORDER — DOCUSATE SODIUM 100 MG/1
100 CAPSULE, LIQUID FILLED ORAL 2 TIMES DAILY
Qty: 60 CAPSULE | Refills: 3 | Status: SHIPPED | OUTPATIENT
Start: 2025-06-03

## 2025-06-03 RX ADMIN — Medication 1 PATCH: at 08:44

## 2025-06-03 RX ADMIN — ASPIRIN 81 MG: 81 TABLET, DELAYED RELEASE ORAL at 08:43
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
## 2025-06-03 NOTE — PLAN OF CARE
Goal Outcome Evaluation:            Pt A/Ox4 this shift, VSS, pt ambulated well to BSC, given PRN tylenol for back pain, pt accidentally pulled out IV during sleep pt does not want new access at this time Hospitalist aware. Pt is resting in bed at this time without complaint or concern. POC ongoing

## 2025-06-03 NOTE — OUTREACH NOTE
Prep Survey      Flowsheet Row Responses   [facility]? [facility]   Is LACE score < 7 ? No   Eligibility Readm Mgmt   Discharge diagnosis Uncontrolled pain   Does the patient have one of the following disease processes/diagnoses(primary or secondary)? Other   Prep survey completed? Yes            Kalli HAIRSTON - Registered Nurse

## 2025-06-03 NOTE — THERAPY TREATMENT NOTE
Acute Care - Physical Therapy Treatment Note   [facility]     Patient Name: Cecil Gomez  : 1950  MRN: [id number]  Today's Date: 6/3/2025   Onset of Illness/Injury or Date of Surgery: 25  Visit Dx:     ICD-10-CM ICD-9-CM   1. Uncontrolled pain  R52 780.96     Patient Active Problem List   Diagnosis    Microhematuria    Elevated PSA    Benign non-nodular prostatic hyperplasia with lower urinary tract symptoms    Kidney stone    Phimosis    Anemia    Personal history of colonic polyps    Dizziness    Bradycardia, sinus    Shortness of breath    Primary hypertension    Precordial pain    Cerebellar infarct    Hypercholesterolemia    Acne rosacea, papular type    Acquired hypothyroidism    Acute pancreatitis    Atopic dermatitis    Cerebrovascular accident    Blood coagulation disorder    Chronic fatigue syndrome    Mesenteric ischemia, chronic    Closed fracture of transverse process of lumbar vertebra    Neuropathy due to type 2 diabetes mellitus    Ex-cigarette smoker    Uncontrolled pain     Past Medical History:   Diagnosis Date    Anemia     Arthritis     Blood clot in abdominal vein     BPH (benign prostatic hypertrophy)     Cerebellar infarct 2024    CHF (congestive heart failure)     Chronic kidney disease     Colon polyp     Depression     Diabetes     Disease of thyroid gland     Diverticulitis     Elevated PSA     GERD (gastroesophageal reflux disease)     GERD (gastroesophageal reflux disease)     H/O blood clots     History of transfusion     Hypercholesterolemia 2024    Hyperlipidemia     Hypertension     MI (myocardial infarction)     Migraines      Past Surgical History:   Procedure Laterality Date    CARDIAC CATHETERIZATION  2012    Normal Coronaries. EF 55%. few PVC    CARDIAC CATHETERIZATION  2005    30% LAD. Myocardial Bridging    CARDIAC CATHETERIZATION  2008    Normal Coronaries    CARDIOVASCULAR STRESS TEST  2015    5 Min.31 Secs. 7.0 METS. 99% THR.  BP- 136/70. EF 58%. Negative.    CARDIOVASCULAR STRESS TEST  06/21/2022    Lexiscan- EF 57%. BP- 165/72. R/O Apical Ischemia    CIRCUMCISION N/A 01/31/2020    Procedure: CIRCUMCISION;  Surgeon: Lalo Camejo MD;  Location: [facility];  Service: Urology    COLONOSCOPY N/A 08/25/2021    COLONOSCOPY- Melvina Martinez MD    CONVERTED (HISTORICAL) HOLTER  06/08/2013    Avg 73. . Rare PAC. 3% Artifacts    CONVERTED (HISTORICAL) HOLTER  06/22/2022    7 Days. Avg 66. . 5 SVT    CONVERTED (HISTORICAL) HOLTER  08/10/2023    5 Days. Avg 64. . Rare PAC & PVC    ECHO - CONVERTED  09/14/2015    EF 60%. Trace MR. RVSP- 35 mmHg    ECHO - CONVERTED  06/21/2022    EF 55%. LA- 4.1. Mild MR & AI. RVSP- 47 mmHg    ECHO - CONVERTED  08/14/2023    EF 60%. LA- 3.8. Mild MR. RVSP- 38 mmHg. No shunt    ENDOSCOPY N/A 08/25/2021    EGD- Melvina Martinez    EXTRACORPOREAL SHOCK WAVE LITHOTRIPSY (ESWL) Left 01/31/2020    LITHOTRIPSY;  Surgeon: Lalo Camejo    OTHER SURGICAL HISTORY  01/08/2015    Pulse O2- Abnormal     PT Assessment (Last 12 Hours)       PT Evaluation and Treatment       Row Name 06/03/25 8964          Physical Therapy Time and Intention    Subjective Information no complaints  -RG     Document Type therapy note (daily note)  -RG     Mode of Treatment physical therapy  -RG     Patient Effort good  -RG     Comment Pt and nursing in agreement for skilled PT on this date. Pt stated that he was going home today but was agreeable for PT. He ambulated 160' CGA/SBA with RW. He was given education on Home safety and performed seated therex at edge of bed.  -RG       Row Name 06/03/25 1322          General Information    Patient Profile Reviewed yes  -RG     Equipment Currently Used at Home none  -RG     Existing Precautions/Restrictions fall;other (see comments)  Pt does report a history of a couple falls  -RG       Row Name 06/03/25 2947          Living Environment    Primary Care  Provided by self  -RG       Row Name 06/03/25 1322          Home Use of Assistive/Adaptive Equipment    Equipment Currently Used at Home none  -RG       Row Name 06/03/25 1322          Pain Scale: FACES Pre/Post-Treatment    Pain: FACES Scale, Pretreatment 2-->hurts little bit  -RG     Posttreatment Pain Rating 2-->hurts little bit  -RG       Row Name 06/03/25 1322          Cognition    Orientation Status (Cognition) oriented x 3  -RG     Follows Commands (Cognition) WFL  -RG     Personal Safety Interventions gait belt;fall prevention program maintained;nonskid shoes/slippers when out of bed  -RG       Row Name 06/03/25 1322          Bed Mobility    Bed Mobility supine-sit;sit-supine  -RG     Supine-Sit [place] (Bed Mobility) supervision  -RG     Sit-Supine [place] (Bed Mobility) supervision  -RG       Row Name 06/03/25 1322          Transfers    Transfers sit-stand transfer;stand-sit transfer  -RG       Row Name 06/03/25 1322          Sit-Stand Transfer    Sit-Stand [place] (Transfers) supervision  -RG     Assistive Device (Sit-Stand Transfers) walker, front-wheeled  -RG       Row Name 06/03/25 1322          Stand-Sit Transfer    Stand-Sit [place] (Transfers) supervision  -RG     Assistive Device (Stand-Sit Transfers) walker, front-wheeled  -RG       Row Name 06/03/25 1322          Gait/Stairs (Locomotion)    [place] Level (Gait) supervision;1 person assist  -RG     Assistive Device (Gait) walker, front-wheeled  -RG     Patient was able to Ambulate yes  -RG     Distance in Feet (Gait) 160  -RG     Pattern (Gait) step-through  -RG     Deviations/Abnormal Patterns (Gait) ariana decreased;gait speed decreased;stride length decreased  -RG     Comment, (Gait/Stairs) Educated pt on going slow and taking his time around obstacles. Cues to push RW and not pick it up.  -RG       Row Name 06/03/25 1322          Motor Skills    Therapeutic Exercise hip;knee;ankle  -RG       Row Name 06/03/25 1322           Hip (Therapeutic Exercise)    Hip (Therapeutic Exercise) strengthening exercise  -RG     Hip Strengthening (Therapeutic Exercise) bilateral;flexion;aBduction;aDduction;marching while seated;sitting;10 repetitions;2 sets  -RG       Row Name 06/03/25 1322          Knee (Therapeutic Exercise)    Knee (Therapeutic Exercise) strengthening exercise  -RG     Knee Strengthening (Therapeutic Exercise) bilateral;flexion;extension;marching while seated;LAQ (long arc quad);sitting;10 repetitions;2 sets  -RG       Row Name 06/03/25 1322          Ankle (Therapeutic Exercise)    Ankle (Therapeutic Exercise) strengthening exercise  -RG     Ankle Strengthening (Therapeutic Exercise) bilateral;dorsiflexion;plantarflexion;sitting;10 repetitions;2 sets  -RG       Row Name 06/03/25 1322          Positioning and Restraints    Pre-Treatment Position in bed  -RG     Post Treatment Position bed  -RG     In Bed notified nsg;sitting EOB;call light within reach;encouraged to call for assist  -RG       Row Name 06/03/25 1322          Therapy Assessment/Plan (PT)    Rehab Potential (PT) good  -RG     Criteria for Skilled Interventions Met (PT) yes;meets criteria;skilled treatment is necessary  -RG     Therapy Frequency (PT) 2 times/wk  -RG     Problem List (PT) problems related to;balance;mobility  -RG     Activity Limitations Related to Problem List (PT) unable to ambulate safely  -RG       Row Name 06/03/25 1322          Physical Therapy Goals    Transfer Goal Selection (PT) transfer, PT goal 1  -RG     Gait Training Goal Selection (PT) gait training, PT goal 1  -RG       Row Name 06/03/25 1322          Transfer Goal 1 (PT)    Activity/Assistive Device (Transfer Goal 1, PT) transfers, all  -RG     [place] Level/Cues Needed (Transfer Goal 1, PT) independent  -RG     Time Frame (Transfer Goal 1, PT) long term goal (LTG);by discharge  -RG       Row Name 06/03/25 1322          Gait Training Goal 1 (PT)    Activity/Assistive Device (Gait  Training Goal 1, PT) gait (walking locomotion);assistive device use;cane, straight  -RG     [place] Level (Gait Training Goal 1, PT) modified independence  -RG     Distance (Gait Training Goal 1, PT) 250  -RG     Time Frame (Gait Training Goal 1, PT) long term goal (LTG);by discharge  -RG               User Key  (r) = Recorded By, (t) = Taken By, (c) = Cosigned By      Initials Name Provider Type    Kodak Padilla PTA Physical Therapist Assistant                      PT Recommendation and Plan  Anticipated Discharge Disposition (PT): home with assist  Therapy Frequency (PT): 2 times/wk          Time Calculation:    PT Charges       Row Name 06/03/25 1331             Time Calculation    PT Received On 06/03/25  -RG         Time Calculation- PT    Total Timed Code Minutes- PT 25 minute(s)  -RG                User Key  (r) = Recorded By, (t) = Taken By, (c) = Cosigned By      Initials Name Provider Type    Kodak Padilla PTA Physical Therapist Assistant                  Therapy Charges for Today       Code Description Service Date Service Provider Modifiers Qty    11706137972 HC GAIT TRAINING EA 15 MIN 6/3/2025 Kodak Bhatti PTA GP, CQ 1    38123644094 HC PT THER PROC EA 15 MIN 6/3/2025 Kodak Bhatti PTA GP, CQ 1            PT G-Codes  AM-PAC 6 Clicks Score (PT): 19    Kodak Bhatti PTA  6/3/2025

## 2025-06-03 NOTE — CASE MANAGEMENT/SOCIAL WORK
Discharge Planning Assessment  [facility]     Patient Name: Cecil Gomez  MRN: [id number]  Today's Date: 6/3/2025    Admit Date: 5/30/2025            Discharge Plan       Row Name 06/03/25 1322       Plan    Final Discharge Disposition Code 01 - home or self-care    Final Note Pt is being discharged home on this date. Pt's brother to provide transportation.                    Expected Discharge Date and Time       Expected Discharge Date Expected Discharge Time    Gwyn 3, 2025             MELVA Osorio

## 2025-06-12 ENCOUNTER — READMISSION MANAGEMENT (OUTPATIENT)
Dept: CALL CENTER | Facility: HOSPITAL | Age: 75
End: 2025-06-12
Payer: MEDICARE

## 2025-06-12 NOTE — OUTREACH NOTE
Medical Week 2 Survey      Flowsheet Row Responses   [facility] patient discharged from? [facility]   Does the patient have one of the following disease processes/diagnoses(primary or secondary)? Other   Week 2 attempt successful? Yes   Call start time 1329   Discharge diagnosis Uncontrolled pain   Call end time 1341   Meds reviewed with patient/caregiver? Yes   Is the patient having any side effects they believe may be caused by any medication additions or changes? No   Does the patient have all medications ordered at discharge? Yes   Is the patient taking all medications as directed (includes completed medication regime)? Yes   Does the patient have a primary care provider?  Yes   Does the patient have an appointment with their PCP within 7 days of discharge? Yes   Has the patient kept scheduled appointments due by today? Yes   Has home health visited the patient within 72 hours of discharge? N/A   Psychosocial issues? No   Did the patient receive a copy of their discharge instructions? Yes   Nursing interventions Reviewed instructions with patient   What is the patient's perception of their health status since discharge? Improving   Is the patient/caregiver able to teach back signs and symptoms related to disease process for when to call PCP? Yes   Is the patient/caregiver able to teach back signs and symptoms related to disease process for when to call 911? Yes   Is the patient/caregiver able to teach back the hierarchy of who to call/visit for symptoms/problems? PCP, Specialist, Home health nurse, Urgent Care, ED, 911 Yes   If the patient is a current smoker, are they able to teach back resources for cessation? Not a smoker   Additional teach back comments states able to ambulate easier, has been gradually increasing walking regimen, recommended discussing PT with PCP, pt agreed with plan   Week 2 Call Completed? Yes   Call end time 1341            Sherri LACY - Registered Nurse

## 2025-06-23 ENCOUNTER — READMISSION MANAGEMENT (OUTPATIENT)
Dept: CALL CENTER | Facility: HOSPITAL | Age: 75
End: 2025-06-23
Payer: MEDICARE

## 2025-06-23 NOTE — OUTREACH NOTE
Medical Week 3 Survey      Flowsheet Row Responses   [facility] facility patient discharged from? [facility]   Does the patient have one of the following disease processes/diagnoses(primary or secondary)? Other   Week 3 attempt successful? No   Unsuccessful attempts Attempt 1   Nerissa Mariano Registered Nurse

## 2025-08-06 ENCOUNTER — OFFICE VISIT (OUTPATIENT)
Dept: CARDIOLOGY | Facility: CLINIC | Age: 75
End: 2025-08-06
Payer: MEDICARE

## 2025-08-06 VITALS
HEART RATE: 52 BPM | DIASTOLIC BLOOD PRESSURE: 70 MMHG | HEIGHT: 68 IN | WEIGHT: 158.2 LBS | BODY MASS INDEX: 23.98 KG/M2 | SYSTOLIC BLOOD PRESSURE: 120 MMHG

## 2025-08-06 DIAGNOSIS — R06.02 SHORTNESS OF BREATH: ICD-10-CM

## 2025-08-06 DIAGNOSIS — Z78.9 STATIN INTOLERANCE: ICD-10-CM

## 2025-08-06 DIAGNOSIS — R42 DIZZINESS: ICD-10-CM

## 2025-08-06 DIAGNOSIS — I10 PRIMARY HYPERTENSION: ICD-10-CM

## 2025-08-06 DIAGNOSIS — R00.2 PALPITATIONS: ICD-10-CM

## 2025-08-06 DIAGNOSIS — R07.2 PRECORDIAL PAIN: Primary | ICD-10-CM

## 2025-08-06 DIAGNOSIS — E78.00 HYPERCHOLESTEROLEMIA: ICD-10-CM

## 2025-08-06 PROCEDURE — 99214 OFFICE O/P EST MOD 30 MIN: CPT | Performed by: NURSE PRACTITIONER

## 2025-08-06 PROCEDURE — 1160F RVW MEDS BY RX/DR IN RCRD: CPT | Performed by: NURSE PRACTITIONER

## 2025-08-06 PROCEDURE — 3078F DIAST BP <80 MM HG: CPT | Performed by: NURSE PRACTITIONER

## 2025-08-06 PROCEDURE — 1159F MED LIST DOCD IN RCRD: CPT | Performed by: NURSE PRACTITIONER

## 2025-08-06 PROCEDURE — 3074F SYST BP LT 130 MM HG: CPT | Performed by: NURSE PRACTITIONER

## 2025-08-11 ENCOUNTER — TELEPHONE (OUTPATIENT)
Dept: CARDIOLOGY | Facility: CLINIC | Age: 75
End: 2025-08-11
Payer: MEDICARE

## 2025-08-13 ENCOUNTER — HOSPITAL ENCOUNTER (OUTPATIENT)
Dept: CARDIOLOGY | Facility: HOSPITAL | Age: 75
Discharge: HOME | End: 2025-08-13
Payer: MEDICARE

## 2025-08-13 DIAGNOSIS — Z78.9 STATIN INTOLERANCE: ICD-10-CM

## 2025-08-13 DIAGNOSIS — I10 PRIMARY HYPERTENSION: ICD-10-CM

## 2025-08-13 DIAGNOSIS — R06.02 SHORTNESS OF BREATH: ICD-10-CM

## 2025-08-13 DIAGNOSIS — E78.00 HYPERCHOLESTEROLEMIA: ICD-10-CM

## 2025-08-13 DIAGNOSIS — R42 DIZZINESS: ICD-10-CM

## 2025-08-13 DIAGNOSIS — R07.2 PRECORDIAL PAIN: ICD-10-CM

## 2025-08-13 DIAGNOSIS — R00.2 PALPITATIONS: ICD-10-CM

## 2025-08-13 LAB
AV MEAN PRESS GRAD SYS DOP V1V2: 5.1 MMHG
AV VMAX SYS DOP: 154.7 CM/SEC
BH CV ECHO MEAS - ACS: 0.97 CM
BH CV ECHO MEAS - AO MAX PG: 9.6 MMHG
BH CV ECHO MEAS - AO ROOT DIAM: 2.7 CM
BH CV ECHO MEAS - AO V2 VTI: 40.4 CM
BH CV ECHO MEAS - EDV(CUBED): 82.3 ML
BH CV ECHO MEAS - EDV(MOD-SP4): 102 ML
BH CV ECHO MEAS - EF(MOD-SP4): 52.9 %
BH CV ECHO MEAS - ESV(CUBED): 21.5 ML
BH CV ECHO MEAS - ESV(MOD-SP4): 48 ML
BH CV ECHO MEAS - FS: 36.1 %
BH CV ECHO MEAS - IVS/LVPW: 0.99 CM
BH CV ECHO MEAS - IVSD: 1.06 CM
BH CV ECHO MEAS - LA DIMENSION: 4 CM
BH CV ECHO MEAS - LAT PEAK E' VEL: 6.6 CM/SEC
BH CV ECHO MEAS - LV DIASTOLIC VOL/BSA (35-75): 55.8 CM2
BH CV ECHO MEAS - LV MASS(C)D: 158.5 GRAMS
BH CV ECHO MEAS - LV SYSTOLIC VOL/BSA (12-30): 26.2 CM2
BH CV ECHO MEAS - LVIDD: 4.4 CM
BH CV ECHO MEAS - LVIDS: 2.8 CM
BH CV ECHO MEAS - LVOT AREA: 3.3 CM2
BH CV ECHO MEAS - LVOT DIAM: 2.05 CM
BH CV ECHO MEAS - LVPWD: 1.07 CM
BH CV ECHO MEAS - MED PEAK E' VEL: 6.1 CM/SEC
BH CV ECHO MEAS - MV A MAX VEL: 71.6 CM/SEC
BH CV ECHO MEAS - MV DEC TIME: 0.18 SEC
BH CV ECHO MEAS - MV E MAX VEL: 91.7 CM/SEC
BH CV ECHO MEAS - MV E/A: 1.28
BH CV ECHO MEAS - RAP SYSTOLE: 10 MMHG
BH CV ECHO MEAS - RVDD: 3.1 CM
BH CV ECHO MEAS - RVSP: 41 MMHG
BH CV ECHO MEAS - SV(MOD-SP4): 54 ML
BH CV ECHO MEAS - SVI(MOD-SP4): 29.5 ML/M2
BH CV ECHO MEAS - TR MAX PG: 31 MMHG
BH CV ECHO MEAS - TR MAX VEL: 278.3 CM/SEC
BH CV ECHO MEASUREMENTS AVERAGE E/E' RATIO: 14.44
BH CV NUCLEAR PRIOR STUDY: 3
BH CV REST NUCLEAR ISOTOPE DOSE: 10 MCI
BH CV STRESS BP STAGE 1: NORMAL
BH CV STRESS COMMENTS STAGE 1: NORMAL
BH CV STRESS DOSE REGADENOSON STAGE 1: 0.4
BH CV STRESS DURATION MIN STAGE 1: 0
BH CV STRESS DURATION SEC STAGE 1: 10
BH CV STRESS HR STAGE 1: 83
BH CV STRESS NUCLEAR ISOTOPE DOSE: 30 MCI
BH CV STRESS PROTOCOL 1: NORMAL
BH CV STRESS RECOVERY BP: NORMAL MMHG
BH CV STRESS RECOVERY HR: 75 BPM
BH CV STRESS STAGE 1: 1
IVRT: 106 MS
LEFT ATRIUM VOLUME INDEX: 27.4 ML/M2
LV EF 3D SEGMENTATION: 51 %
MAXIMAL PREDICTED HEART RATE: 144 BPM
PERCENT MAX PREDICTED HR: 57.64 %
SPECT HRT GATED+EF W RNC IV: 56 %
STRESS BASELINE BP: NORMAL MMHG
STRESS BASELINE HR: 47 BPM
STRESS PERCENT HR: 68 %
STRESS POST PEAK BP: NORMAL MMHG
STRESS POST PEAK HR: 83 BPM
STRESS TARGET HR: 122 BPM

## 2025-08-13 PROCEDURE — 34310000005 TECHNETIUM SESTAMIBI: Performed by: INTERNAL MEDICINE

## 2025-08-13 PROCEDURE — A9500 TC99M SESTAMIBI: HCPCS | Performed by: INTERNAL MEDICINE

## 2025-08-13 PROCEDURE — 93306 TTE W/DOPPLER COMPLETE: CPT

## 2025-08-13 PROCEDURE — 78452 HT MUSCLE IMAGE SPECT MULT: CPT

## 2025-08-13 PROCEDURE — 25010000002 REGADENOSON 0.4 MG/5ML SOLUTION: Performed by: NURSE PRACTITIONER

## 2025-08-13 PROCEDURE — 93017 CV STRESS TEST TRACING ONLY: CPT

## 2025-08-13 RX ORDER — REGADENOSON 0.08 MG/ML
0.4 INJECTION, SOLUTION INTRAVENOUS
Status: COMPLETED | OUTPATIENT
Start: 2025-08-13 | End: 2025-08-13

## 2025-08-13 RX ADMIN — TECHNETIUM TC 99M SESTAMIBI 1 DOSE: 1 INJECTION INTRAVENOUS at 09:44

## 2025-08-13 RX ADMIN — TECHNETIUM TC 99M SESTAMIBI 1 DOSE: 1 INJECTION INTRAVENOUS at 11:07

## 2025-08-13 RX ADMIN — REGADENOSON 0.4 MG: 0.08 INJECTION, SOLUTION INTRAVENOUS at 10:58

## (undated) DEVICE — CONN Y IRR DISP 1P/U

## (undated) DEVICE — Device

## (undated) DEVICE — GLV SURG PREMIERPRO MIC LTX PF SZ8 BRN

## (undated) DEVICE — GOWN,REINF,POLY,ECL,PP SLV,XL: Brand: MEDLINE

## (undated) DEVICE — UNDYED BRAIDED (POLYGLACTIN 910), SYNTHETIC ABSORBABLE SUTURE: Brand: COATED VICRYL

## (undated) DEVICE — THE BITE BLOCK MAXI, LATEX FREE STRAP IS USED TO PROTECT THE ENDOSCOPE INSERTION TUBE FROM BEING BITTEN BY THE PATIENT.

## (undated) DEVICE — PK BASIC 70

## (undated) DEVICE — SUCTION CANISTER, 1500CC, RIGID: Brand: DEROYAL

## (undated) DEVICE — DRSNG GZ PETROLTM CURAD 1X8IN STRL

## (undated) DEVICE — BNDG GZ SOF-FORM CONFRM 2X75IN LF STRL

## (undated) DEVICE — TRY SKINPREP PVP SCRB W PAINT

## (undated) DEVICE — SUT GUT CHRM 3/0 FS2 27IN 636H

## (undated) DEVICE — DRAPE,LAPAROTOMY,PED,STERILE: Brand: MEDLINE

## (undated) DEVICE — ENDOGATOR AUXILIARY WATER JET CONNECTOR: Brand: ENDOGATOR

## (undated) DEVICE — TUBING, SUCTION, 1/4" X 20', STRAIGHT: Brand: MEDLINE INDUSTRIES, INC.

## (undated) DEVICE — FRCP BX RADJAW4 NDL 2.8 240CM LG OG BX40

## (undated) DEVICE — Device: Brand: DEFENDO AIR/WATER/SUCTION AND BIOPSY VALVE

## (undated) DEVICE — SYR LUERLOK 30CC

## (undated) DEVICE — SINGLE PORT MANIFOLD: Brand: NEPTUNE 2

## (undated) DEVICE — THE EXACTO COLD SNARE IS INTENDED TO BE USED WITHOUT DIATHERMIC ENERGY FOR THE ENDOSCOPIC RESECTION OF POLYP TISSUE IN THE GASTROINTESTINAL TRACT.: Brand: EXACTO